# Patient Record
Sex: FEMALE | Race: WHITE | NOT HISPANIC OR LATINO | Employment: OTHER | URBAN - METROPOLITAN AREA
[De-identification: names, ages, dates, MRNs, and addresses within clinical notes are randomized per-mention and may not be internally consistent; named-entity substitution may affect disease eponyms.]

---

## 2021-11-23 ENCOUNTER — HOSPITAL ENCOUNTER (INPATIENT)
Facility: HOSPITAL | Age: 66
LOS: 6 days | Discharge: NURSING, REHAB OR LONG-TERM CARE | End: 2021-11-29
Attending: EMERGENCY MEDICINE | Admitting: INTERNAL MEDICINE

## 2021-11-23 ENCOUNTER — APPOINTMENT (OUTPATIENT)
Dept: GENERAL RADIOLOGY | Facility: HOSPITAL | Age: 66
End: 2021-11-23

## 2021-11-23 ENCOUNTER — APPOINTMENT (OUTPATIENT)
Dept: CT IMAGING | Facility: HOSPITAL | Age: 66
End: 2021-11-23

## 2021-11-23 ENCOUNTER — APPOINTMENT (OUTPATIENT)
Dept: MRI IMAGING | Facility: HOSPITAL | Age: 66
End: 2021-11-23

## 2021-11-23 ENCOUNTER — APPOINTMENT (OUTPATIENT)
Dept: CARDIOLOGY | Facility: HOSPITAL | Age: 66
End: 2021-11-23

## 2021-11-23 DIAGNOSIS — E11.65 UNCONTROLLED TYPE 2 DIABETES MELLITUS WITH HYPERGLYCEMIA (HCC): ICD-10-CM

## 2021-11-23 DIAGNOSIS — G62.9 PERIPHERAL POLYNEUROPATHY: Chronic | ICD-10-CM

## 2021-11-23 DIAGNOSIS — I63.9 CEREBROVASCULAR ACCIDENT (CVA), UNSPECIFIED MECHANISM (HCC): Primary | ICD-10-CM

## 2021-11-23 PROBLEM — E78.5 HYPERLIPIDEMIA: Chronic | Status: ACTIVE | Noted: 2021-11-23

## 2021-11-23 PROBLEM — F41.8 ANXIETY ASSOCIATED WITH DEPRESSION: Chronic | Status: ACTIVE | Noted: 2021-11-23

## 2021-11-23 PROBLEM — R53.1 LEFT-SIDED WEAKNESS: Status: ACTIVE | Noted: 2021-11-23

## 2021-11-23 PROBLEM — E66.01 MORBID OBESITY (HCC): Chronic | Status: ACTIVE | Noted: 2021-11-23

## 2021-11-23 PROBLEM — R42 DIZZINESS: Status: ACTIVE | Noted: 2021-11-23

## 2021-11-23 LAB
ABO GROUP BLD: NORMAL
ALBUMIN SERPL-MCNC: 3.9 G/DL (ref 3.5–5.2)
ALBUMIN/GLOB SERPL: 1.3 G/DL
ALP SERPL-CCNC: 121 U/L (ref 39–117)
ALT SERPL W P-5'-P-CCNC: 15 U/L (ref 1–33)
ANION GAP SERPL CALCULATED.3IONS-SCNC: 12 MMOL/L (ref 5–15)
APTT PPP: 27.1 SECONDS (ref 24–31)
AST SERPL-CCNC: 12 U/L (ref 1–32)
BASOPHILS # BLD AUTO: 0.1 10*3/MM3 (ref 0–0.2)
BASOPHILS NFR BLD AUTO: 1.1 % (ref 0–1.5)
BILIRUB SERPL-MCNC: 0.3 MG/DL (ref 0–1.2)
BLD GP AB SCN SERPL QL: NEGATIVE
BUN SERPL-MCNC: 19 MG/DL (ref 8–23)
BUN/CREAT SERPL: 25.7 (ref 7–25)
CALCIUM SPEC-SCNC: 8.9 MG/DL (ref 8.6–10.5)
CHLORIDE SERPL-SCNC: 99 MMOL/L (ref 98–107)
CHOLEST SERPL-MCNC: 225 MG/DL (ref 0–200)
CO2 SERPL-SCNC: 27 MMOL/L (ref 22–29)
CREAT SERPL-MCNC: 0.74 MG/DL (ref 0.57–1)
DEPRECATED RDW RBC AUTO: 44.2 FL (ref 37–54)
EOSINOPHIL # BLD AUTO: 0.2 10*3/MM3 (ref 0–0.4)
EOSINOPHIL NFR BLD AUTO: 2.9 % (ref 0.3–6.2)
ERYTHROCYTE [DISTWIDTH] IN BLOOD BY AUTOMATED COUNT: 14.1 % (ref 12.3–15.4)
FOLATE SERPL-MCNC: 12.2 NG/ML (ref 4.78–24.2)
GFR SERPL CREATININE-BSD FRML MDRD: 79 ML/MIN/1.73
GLOBULIN UR ELPH-MCNC: 3 GM/DL
GLUCOSE BLDC GLUCOMTR-MCNC: 158 MG/DL (ref 70–105)
GLUCOSE BLDC GLUCOMTR-MCNC: 308 MG/DL (ref 70–105)
GLUCOSE SERPL-MCNC: 314 MG/DL (ref 65–99)
HBA1C MFR BLD: 13.2 % (ref 3.5–5.6)
HCT VFR BLD AUTO: 39.3 % (ref 34–46.6)
HDLC SERPL-MCNC: 62 MG/DL (ref 40–60)
HGB BLD-MCNC: 13.4 G/DL (ref 12–15.9)
HOLD SPECIMEN: NORMAL
HOLD SPECIMEN: NORMAL
INR PPP: <0.93 (ref 0.93–1.1)
LDLC SERPL CALC-MCNC: 105 MG/DL (ref 0–100)
LDLC/HDLC SERPL: 1.52 {RATIO}
LYMPHOCYTES # BLD AUTO: 2.2 10*3/MM3 (ref 0.7–3.1)
LYMPHOCYTES NFR BLD AUTO: 31 % (ref 19.6–45.3)
MAGNESIUM SERPL-MCNC: 1.8 MG/DL (ref 1.6–2.4)
MCH RBC QN AUTO: 30.7 PG (ref 26.6–33)
MCHC RBC AUTO-ENTMCNC: 34 G/DL (ref 31.5–35.7)
MCV RBC AUTO: 90.1 FL (ref 79–97)
MONOCYTES # BLD AUTO: 0.4 10*3/MM3 (ref 0.1–0.9)
MONOCYTES NFR BLD AUTO: 5.2 % (ref 5–12)
NEUTROPHILS NFR BLD AUTO: 4.3 10*3/MM3 (ref 1.7–7)
NEUTROPHILS NFR BLD AUTO: 59.8 % (ref 42.7–76)
NRBC BLD AUTO-RTO: 0.1 /100 WBC (ref 0–0.2)
PLATELET # BLD AUTO: 287 10*3/MM3 (ref 140–450)
PMV BLD AUTO: 7 FL (ref 6–12)
POTASSIUM SERPL-SCNC: 4.2 MMOL/L (ref 3.5–5.2)
POTASSIUM SERPL-SCNC: 4.4 MMOL/L (ref 3.5–5.2)
PROT SERPL-MCNC: 6.9 G/DL (ref 6–8.5)
PROTHROMBIN TIME: 10.3 SECONDS (ref 9.6–11.7)
RBC # BLD AUTO: 4.37 10*6/MM3 (ref 3.77–5.28)
RH BLD: POSITIVE
SARS-COV-2 RNA PNL SPEC NAA+PROBE: NOT DETECTED
SODIUM SERPL-SCNC: 138 MMOL/L (ref 136–145)
T&S EXPIRATION DATE: NORMAL
T4 FREE SERPL-MCNC: 1.4 NG/DL (ref 0.93–1.7)
TRIGL SERPL-MCNC: 344 MG/DL (ref 0–150)
TROPONIN T SERPL-MCNC: <0.01 NG/ML (ref 0–0.03)
TSH SERPL DL<=0.05 MIU/L-ACNC: 0.28 UIU/ML (ref 0.27–4.2)
VIT B12 BLD-MCNC: 299 PG/ML (ref 211–946)
VLDLC SERPL-MCNC: 58 MG/DL (ref 5–40)
WBC NRBC COR # BLD: 7.1 10*3/MM3 (ref 3.4–10.8)

## 2021-11-23 PROCEDURE — 86850 RBC ANTIBODY SCREEN: CPT | Performed by: EMERGENCY MEDICINE

## 2021-11-23 PROCEDURE — 85730 THROMBOPLASTIN TIME PARTIAL: CPT | Performed by: EMERGENCY MEDICINE

## 2021-11-23 PROCEDURE — 99222 1ST HOSP IP/OBS MODERATE 55: CPT | Performed by: PSYCHIATRY & NEUROLOGY

## 2021-11-23 PROCEDURE — 86900 BLOOD TYPING SEROLOGIC ABO: CPT | Performed by: EMERGENCY MEDICINE

## 2021-11-23 PROCEDURE — 86900 BLOOD TYPING SEROLOGIC ABO: CPT

## 2021-11-23 PROCEDURE — 63710000001 INSULIN GLARGINE PER 5 UNITS: Performed by: INTERNAL MEDICINE

## 2021-11-23 PROCEDURE — 80061 LIPID PANEL: CPT | Performed by: PSYCHIATRY & NEUROLOGY

## 2021-11-23 PROCEDURE — 85025 COMPLETE CBC W/AUTO DIFF WBC: CPT | Performed by: EMERGENCY MEDICINE

## 2021-11-23 PROCEDURE — 63710000001 INSULIN LISPRO (HUMAN) PER 5 UNITS: Performed by: EMERGENCY MEDICINE

## 2021-11-23 PROCEDURE — 0 IOPAMIDOL PER 1 ML: Performed by: EMERGENCY MEDICINE

## 2021-11-23 PROCEDURE — 93005 ELECTROCARDIOGRAM TRACING: CPT | Performed by: EMERGENCY MEDICINE

## 2021-11-23 PROCEDURE — 70496 CT ANGIOGRAPHY HEAD: CPT

## 2021-11-23 PROCEDURE — 70498 CT ANGIOGRAPHY NECK: CPT

## 2021-11-23 PROCEDURE — 83036 HEMOGLOBIN GLYCOSYLATED A1C: CPT | Performed by: PSYCHIATRY & NEUROLOGY

## 2021-11-23 PROCEDURE — 82962 GLUCOSE BLOOD TEST: CPT

## 2021-11-23 PROCEDURE — 84484 ASSAY OF TROPONIN QUANT: CPT | Performed by: EMERGENCY MEDICINE

## 2021-11-23 PROCEDURE — 92610 EVALUATE SWALLOWING FUNCTION: CPT

## 2021-11-23 PROCEDURE — 80053 COMPREHEN METABOLIC PANEL: CPT | Performed by: EMERGENCY MEDICINE

## 2021-11-23 PROCEDURE — 82607 VITAMIN B-12: CPT | Performed by: PSYCHIATRY & NEUROLOGY

## 2021-11-23 PROCEDURE — 84439 ASSAY OF FREE THYROXINE: CPT | Performed by: PSYCHIATRY & NEUROLOGY

## 2021-11-23 PROCEDURE — 71045 X-RAY EXAM CHEST 1 VIEW: CPT

## 2021-11-23 PROCEDURE — 84132 ASSAY OF SERUM POTASSIUM: CPT | Performed by: PHYSICIAN ASSISTANT

## 2021-11-23 PROCEDURE — 87635 SARS-COV-2 COVID-19 AMP PRB: CPT | Performed by: EMERGENCY MEDICINE

## 2021-11-23 PROCEDURE — 99222 1ST HOSP IP/OBS MODERATE 55: CPT | Performed by: PHYSICIAN ASSISTANT

## 2021-11-23 PROCEDURE — 86901 BLOOD TYPING SEROLOGIC RH(D): CPT

## 2021-11-23 PROCEDURE — 86901 BLOOD TYPING SEROLOGIC RH(D): CPT | Performed by: EMERGENCY MEDICINE

## 2021-11-23 PROCEDURE — 99285 EMERGENCY DEPT VISIT HI MDM: CPT

## 2021-11-23 PROCEDURE — 84443 ASSAY THYROID STIM HORMONE: CPT | Performed by: PSYCHIATRY & NEUROLOGY

## 2021-11-23 PROCEDURE — 85610 PROTHROMBIN TIME: CPT | Performed by: EMERGENCY MEDICINE

## 2021-11-23 PROCEDURE — 82746 ASSAY OF FOLIC ACID SERUM: CPT | Performed by: PSYCHIATRY & NEUROLOGY

## 2021-11-23 PROCEDURE — 36415 COLL VENOUS BLD VENIPUNCTURE: CPT | Performed by: EMERGENCY MEDICINE

## 2021-11-23 PROCEDURE — 70551 MRI BRAIN STEM W/O DYE: CPT

## 2021-11-23 PROCEDURE — 70450 CT HEAD/BRAIN W/O DYE: CPT

## 2021-11-23 PROCEDURE — 83735 ASSAY OF MAGNESIUM: CPT | Performed by: PHYSICIAN ASSISTANT

## 2021-11-23 RX ORDER — ONDANSETRON 2 MG/ML
4 INJECTION INTRAMUSCULAR; INTRAVENOUS EVERY 6 HOURS PRN
Status: DISCONTINUED | OUTPATIENT
Start: 2021-11-23 | End: 2021-11-29 | Stop reason: HOSPADM

## 2021-11-23 RX ORDER — GABAPENTIN 300 MG/1
300 CAPSULE ORAL
Status: DISCONTINUED | OUTPATIENT
Start: 2021-11-24 | End: 2021-11-29 | Stop reason: HOSPADM

## 2021-11-23 RX ORDER — INSULIN LISPRO 100 [IU]/ML
5 INJECTION, SOLUTION INTRAVENOUS; SUBCUTANEOUS
Status: DISCONTINUED | OUTPATIENT
Start: 2021-11-23 | End: 2021-11-29 | Stop reason: HOSPADM

## 2021-11-23 RX ORDER — SERTRALINE HYDROCHLORIDE 100 MG/1
150 TABLET, FILM COATED ORAL DAILY
COMMUNITY

## 2021-11-23 RX ORDER — PRAVASTATIN SODIUM 40 MG
40 TABLET ORAL DAILY
COMMUNITY
End: 2021-11-23

## 2021-11-23 RX ORDER — POTASSIUM CHLORIDE 20 MEQ/1
40 TABLET, EXTENDED RELEASE ORAL AS NEEDED
Status: DISCONTINUED | OUTPATIENT
Start: 2021-11-23 | End: 2021-11-29 | Stop reason: HOSPADM

## 2021-11-23 RX ORDER — DEXTROSE MONOHYDRATE 25 G/50ML
25 INJECTION, SOLUTION INTRAVENOUS
Status: DISCONTINUED | OUTPATIENT
Start: 2021-11-23 | End: 2021-11-29 | Stop reason: HOSPADM

## 2021-11-23 RX ORDER — MAGNESIUM SULFATE HEPTAHYDRATE 40 MG/ML
4 INJECTION, SOLUTION INTRAVENOUS AS NEEDED
Status: DISCONTINUED | OUTPATIENT
Start: 2021-11-23 | End: 2021-11-29 | Stop reason: HOSPADM

## 2021-11-23 RX ORDER — INSULIN LISPRO 100 [IU]/ML
6 INJECTION, SOLUTION INTRAVENOUS; SUBCUTANEOUS ONCE
Status: COMPLETED | OUTPATIENT
Start: 2021-11-23 | End: 2021-11-23

## 2021-11-23 RX ORDER — ATORVASTATIN CALCIUM 20 MG/1
20 TABLET, FILM COATED ORAL DAILY
COMMUNITY
End: 2021-11-29 | Stop reason: HOSPADM

## 2021-11-23 RX ORDER — INSULIN GLARGINE 100 [IU]/ML
10 INJECTION, SOLUTION SUBCUTANEOUS EVERY MORNING
COMMUNITY
End: 2021-11-29 | Stop reason: HOSPADM

## 2021-11-23 RX ORDER — OLANZAPINE 10 MG/2ML
1 INJECTION, POWDER, LYOPHILIZED, FOR SOLUTION INTRAMUSCULAR
Status: DISCONTINUED | OUTPATIENT
Start: 2021-11-23 | End: 2021-11-29 | Stop reason: HOSPADM

## 2021-11-23 RX ORDER — LISINOPRIL 10 MG/1
10 TABLET ORAL DAILY
Status: ON HOLD | COMMUNITY
End: 2021-11-29 | Stop reason: SDUPTHER

## 2021-11-23 RX ORDER — SODIUM CHLORIDE 0.9 % (FLUSH) 0.9 %
10 SYRINGE (ML) INJECTION AS NEEDED
Status: DISCONTINUED | OUTPATIENT
Start: 2021-11-23 | End: 2021-11-29 | Stop reason: HOSPADM

## 2021-11-23 RX ORDER — GABAPENTIN 300 MG/1
300 CAPSULE ORAL
COMMUNITY

## 2021-11-23 RX ORDER — CHOLECALCIFEROL (VITAMIN D3) 125 MCG
5 CAPSULE ORAL NIGHTLY PRN
Status: DISCONTINUED | OUTPATIENT
Start: 2021-11-23 | End: 2021-11-29 | Stop reason: HOSPADM

## 2021-11-23 RX ORDER — TRAMADOL HYDROCHLORIDE 100 MG/1
100 TABLET, EXTENDED RELEASE ORAL
COMMUNITY
End: 2021-11-23

## 2021-11-23 RX ORDER — GABAPENTIN 300 MG/1
600 CAPSULE ORAL NIGHTLY
Status: DISCONTINUED | OUTPATIENT
Start: 2021-11-23 | End: 2021-11-29 | Stop reason: HOSPADM

## 2021-11-23 RX ORDER — INSULIN GLARGINE 100 [IU]/ML
15 INJECTION, SOLUTION SUBCUTANEOUS EVERY 12 HOURS SCHEDULED
Status: DISCONTINUED | OUTPATIENT
Start: 2021-11-23 | End: 2021-11-29 | Stop reason: HOSPADM

## 2021-11-23 RX ORDER — INSULIN LISPRO 100 [IU]/ML
0-24 INJECTION, SOLUTION INTRAVENOUS; SUBCUTANEOUS AS NEEDED
Status: DISCONTINUED | OUTPATIENT
Start: 2021-11-23 | End: 2021-11-29 | Stop reason: HOSPADM

## 2021-11-23 RX ORDER — GABAPENTIN 300 MG/1
600 CAPSULE ORAL NIGHTLY
COMMUNITY

## 2021-11-23 RX ORDER — LISINOPRIL 5 MG/1
10 TABLET ORAL DAILY
Status: DISCONTINUED | OUTPATIENT
Start: 2021-11-24 | End: 2021-11-29 | Stop reason: HOSPADM

## 2021-11-23 RX ORDER — INSULIN LISPRO 100 [IU]/ML
0-24 INJECTION, SOLUTION INTRAVENOUS; SUBCUTANEOUS
Status: DISCONTINUED | OUTPATIENT
Start: 2021-11-23 | End: 2021-11-29 | Stop reason: HOSPADM

## 2021-11-23 RX ORDER — SODIUM CHLORIDE 0.9 % (FLUSH) 0.9 %
10 SYRINGE (ML) INJECTION EVERY 12 HOURS SCHEDULED
Status: DISCONTINUED | OUTPATIENT
Start: 2021-11-23 | End: 2021-11-29 | Stop reason: HOSPADM

## 2021-11-23 RX ORDER — ALUMINA, MAGNESIA, AND SIMETHICONE 2400; 2400; 240 MG/30ML; MG/30ML; MG/30ML
15 SUSPENSION ORAL EVERY 6 HOURS PRN
Status: DISCONTINUED | OUTPATIENT
Start: 2021-11-23 | End: 2021-11-29 | Stop reason: HOSPADM

## 2021-11-23 RX ORDER — ATORVASTATIN CALCIUM 40 MG/1
40 TABLET, FILM COATED ORAL NIGHTLY
Status: DISCONTINUED | OUTPATIENT
Start: 2021-11-23 | End: 2021-11-29 | Stop reason: HOSPADM

## 2021-11-23 RX ORDER — METFORMIN HYDROCHLORIDE 500 MG/1
1000 TABLET, EXTENDED RELEASE ORAL 2 TIMES DAILY
COMMUNITY

## 2021-11-23 RX ORDER — POTASSIUM CHLORIDE 7.45 MG/ML
10 INJECTION INTRAVENOUS
Status: DISCONTINUED | OUTPATIENT
Start: 2021-11-23 | End: 2021-11-29 | Stop reason: HOSPADM

## 2021-11-23 RX ORDER — NICOTINE POLACRILEX 4 MG
15 LOZENGE BUCCAL
Status: DISCONTINUED | OUTPATIENT
Start: 2021-11-23 | End: 2021-11-29 | Stop reason: HOSPADM

## 2021-11-23 RX ORDER — PIOGLITAZONEHYDROCHLORIDE 45 MG/1
45 TABLET ORAL DAILY
COMMUNITY
End: 2021-11-29 | Stop reason: HOSPADM

## 2021-11-23 RX ORDER — ACETAMINOPHEN 650 MG/1
650 SUPPOSITORY RECTAL EVERY 4 HOURS PRN
Status: DISCONTINUED | OUTPATIENT
Start: 2021-11-23 | End: 2021-11-29 | Stop reason: HOSPADM

## 2021-11-23 RX ORDER — PIOGLITAZONEHYDROCHLORIDE 45 MG/1
45 TABLET ORAL DAILY
Status: DISCONTINUED | OUTPATIENT
Start: 2021-11-24 | End: 2021-11-23

## 2021-11-23 RX ORDER — NITROGLYCERIN 0.4 MG/1
0.4 TABLET SUBLINGUAL
Status: DISCONTINUED | OUTPATIENT
Start: 2021-11-23 | End: 2021-11-29 | Stop reason: HOSPADM

## 2021-11-23 RX ORDER — MAGNESIUM SULFATE HEPTAHYDRATE 40 MG/ML
2 INJECTION, SOLUTION INTRAVENOUS AS NEEDED
Status: DISCONTINUED | OUTPATIENT
Start: 2021-11-23 | End: 2021-11-29 | Stop reason: HOSPADM

## 2021-11-23 RX ORDER — ACETAMINOPHEN 325 MG/1
650 TABLET ORAL EVERY 4 HOURS PRN
Status: DISCONTINUED | OUTPATIENT
Start: 2021-11-23 | End: 2021-11-29 | Stop reason: HOSPADM

## 2021-11-23 RX ORDER — POTASSIUM CHLORIDE 1.5 G/1.77G
40 POWDER, FOR SOLUTION ORAL AS NEEDED
Status: DISCONTINUED | OUTPATIENT
Start: 2021-11-23 | End: 2021-11-29 | Stop reason: HOSPADM

## 2021-11-23 RX ORDER — ONDANSETRON 4 MG/1
4 TABLET, FILM COATED ORAL EVERY 6 HOURS PRN
Status: DISCONTINUED | OUTPATIENT
Start: 2021-11-23 | End: 2021-11-29 | Stop reason: HOSPADM

## 2021-11-23 RX ORDER — ASPIRIN 325 MG
325 TABLET ORAL DAILY
Status: DISCONTINUED | OUTPATIENT
Start: 2021-11-23 | End: 2021-11-29 | Stop reason: HOSPADM

## 2021-11-23 RX ORDER — ACETAMINOPHEN 160 MG/5ML
650 SOLUTION ORAL EVERY 4 HOURS PRN
Status: DISCONTINUED | OUTPATIENT
Start: 2021-11-23 | End: 2021-11-29 | Stop reason: HOSPADM

## 2021-11-23 RX ADMIN — ASPIRIN 325 MG ORAL TABLET 325 MG: 325 PILL ORAL at 15:21

## 2021-11-23 RX ADMIN — ATORVASTATIN CALCIUM 40 MG: 40 TABLET, FILM COATED ORAL at 20:18

## 2021-11-23 RX ADMIN — IOPAMIDOL 100 ML: 755 INJECTION, SOLUTION INTRAVENOUS at 14:47

## 2021-11-23 RX ADMIN — SODIUM CHLORIDE 500 ML: 9 INJECTION, SOLUTION INTRAVENOUS at 20:08

## 2021-11-23 RX ADMIN — INSULIN GLARGINE 15 UNITS: 100 INJECTION, SOLUTION SUBCUTANEOUS at 19:52

## 2021-11-23 RX ADMIN — GABAPENTIN 600 MG: 300 CAPSULE ORAL at 22:25

## 2021-11-23 RX ADMIN — INSULIN LISPRO 6 UNITS: 100 INJECTION, SOLUTION INTRAVENOUS; SUBCUTANEOUS at 16:43

## 2021-11-23 RX ADMIN — SODIUM CHLORIDE, PRESERVATIVE FREE 10 ML: 5 INJECTION INTRAVENOUS at 20:18

## 2021-11-24 ENCOUNTER — APPOINTMENT (OUTPATIENT)
Dept: CARDIOLOGY | Facility: HOSPITAL | Age: 66
End: 2021-11-24

## 2021-11-24 ENCOUNTER — APPOINTMENT (OUTPATIENT)
Dept: MRI IMAGING | Facility: HOSPITAL | Age: 66
End: 2021-11-24

## 2021-11-24 LAB
ANION GAP SERPL CALCULATED.3IONS-SCNC: 11 MMOL/L (ref 5–15)
BASOPHILS # BLD AUTO: 0 10*3/MM3 (ref 0–0.2)
BASOPHILS NFR BLD AUTO: 0.6 % (ref 0–1.5)
BH CV ECHO MEAS - AO MAX PG (FULL): 3.4 MMHG
BH CV ECHO MEAS - AO MAX PG: 8.4 MMHG
BH CV ECHO MEAS - AO MEAN PG (FULL): 1.7 MMHG
BH CV ECHO MEAS - AO MEAN PG: 4.1 MMHG
BH CV ECHO MEAS - AO ROOT AREA (BSA CORRECTED): 1.5
BH CV ECHO MEAS - AO ROOT AREA: 9 CM^2
BH CV ECHO MEAS - AO ROOT DIAM: 3.4 CM
BH CV ECHO MEAS - AO V2 MAX: 144.8 CM/SEC
BH CV ECHO MEAS - AO V2 MEAN: 94.3 CM/SEC
BH CV ECHO MEAS - AO V2 VTI: 29.4 CM
BH CV ECHO MEAS - ASC AORTA: 3.9 CM
BH CV ECHO MEAS - AVA(I,A): 3.8 CM^2
BH CV ECHO MEAS - AVA(I,D): 3.8 CM^2
BH CV ECHO MEAS - AVA(V,A): 3.3 CM^2
BH CV ECHO MEAS - AVA(V,D): 3.3 CM^2
BH CV ECHO MEAS - BSA(HAYCOCK): 2.5 M^2
BH CV ECHO MEAS - BSA: 2.3 M^2
BH CV ECHO MEAS - BZI_BMI: 38.8 KILOGRAMS/M^2
BH CV ECHO MEAS - BZI_METRIC_HEIGHT: 175.3 CM
BH CV ECHO MEAS - BZI_METRIC_WEIGHT: 119.3 KG
BH CV ECHO MEAS - EDV(CUBED): 75.4 ML
BH CV ECHO MEAS - EDV(MOD-SP4): 87.4 ML
BH CV ECHO MEAS - EDV(TEICH): 79.7 ML
BH CV ECHO MEAS - EF(CUBED): 67.7 %
BH CV ECHO MEAS - EF(MOD-BP): 58 %
BH CV ECHO MEAS - EF(MOD-SP4): 57.7 %
BH CV ECHO MEAS - EF(TEICH): 59.6 %
BH CV ECHO MEAS - ESV(CUBED): 24.4 ML
BH CV ECHO MEAS - ESV(MOD-SP4): 37 ML
BH CV ECHO MEAS - ESV(TEICH): 32.2 ML
BH CV ECHO MEAS - FS: 31.4 %
BH CV ECHO MEAS - IVS/LVPW: 0.97
BH CV ECHO MEAS - IVSD: 1.9 CM
BH CV ECHO MEAS - LA DIMENSION(2D): 4.4 CM
BH CV ECHO MEAS - LV DIASTOLIC VOL/BSA (35-75): 37.7 ML/M^2
BH CV ECHO MEAS - LV MASS(C)D: 381.4 GRAMS
BH CV ECHO MEAS - LV MASS(C)DI: 164.4 GRAMS/M^2
BH CV ECHO MEAS - LV MAX PG: 5 MMHG
BH CV ECHO MEAS - LV MEAN PG: 2.4 MMHG
BH CV ECHO MEAS - LV SYSTOLIC VOL/BSA (12-30): 15.9 ML/M^2
BH CV ECHO MEAS - LV V1 MAX: 112.1 CM/SEC
BH CV ECHO MEAS - LV V1 MEAN: 70.6 CM/SEC
BH CV ECHO MEAS - LV V1 VTI: 26.6 CM
BH CV ECHO MEAS - LVIDD: 4.2 CM
BH CV ECHO MEAS - LVIDS: 2.9 CM
BH CV ECHO MEAS - LVOT AREA: 4.2 CM^2
BH CV ECHO MEAS - LVOT DIAM: 2.3 CM
BH CV ECHO MEAS - LVPWD: 2 CM
BH CV ECHO MEAS - MV A MAX VEL: 98.7 CM/SEC
BH CV ECHO MEAS - MV DEC SLOPE: 220.2 CM/SEC^2
BH CV ECHO MEAS - MV DEC TIME: 0.35 SEC
BH CV ECHO MEAS - MV E MAX VEL: 76.8 CM/SEC
BH CV ECHO MEAS - MV E/A: 0.78
BH CV ECHO MEAS - MV MAX PG: 5.8 MMHG
BH CV ECHO MEAS - MV MEAN PG: 2.5 MMHG
BH CV ECHO MEAS - MV V2 MAX: 119.9 CM/SEC
BH CV ECHO MEAS - MV V2 MEAN: 75 CM/SEC
BH CV ECHO MEAS - MV V2 VTI: 27.8 CM
BH CV ECHO MEAS - MVA(VTI): 4.1 CM^2
BH CV ECHO MEAS - PA ACC TIME: 0.11 SEC
BH CV ECHO MEAS - PA MAX PG (FULL): 1.2 MMHG
BH CV ECHO MEAS - PA MAX PG: 3.1 MMHG
BH CV ECHO MEAS - PA MEAN PG (FULL): 0.72 MMHG
BH CV ECHO MEAS - PA MEAN PG: 1.6 MMHG
BH CV ECHO MEAS - PA PR(ACCEL): 29.2 MMHG
BH CV ECHO MEAS - PA V2 MAX: 87.8 CM/SEC
BH CV ECHO MEAS - PA V2 MEAN: 58.5 CM/SEC
BH CV ECHO MEAS - PA V2 VTI: 17.8 CM
BH CV ECHO MEAS - RV MAX PG: 1.8 MMHG
BH CV ECHO MEAS - RV MEAN PG: 0.86 MMHG
BH CV ECHO MEAS - RV V1 MAX: 67.8 CM/SEC
BH CV ECHO MEAS - RV V1 MEAN: 41.1 CM/SEC
BH CV ECHO MEAS - RV V1 VTI: 11.7 CM
BH CV ECHO MEAS - RVDD: 3.8 CM
BH CV ECHO MEAS - SI(AO): 113.9 ML/M^2
BH CV ECHO MEAS - SI(CUBED): 22 ML/M^2
BH CV ECHO MEAS - SI(LVOT): 48.6 ML/M^2
BH CV ECHO MEAS - SI(MOD-SP4): 21.7 ML/M^2
BH CV ECHO MEAS - SI(TEICH): 20.5 ML/M^2
BH CV ECHO MEAS - SV(AO): 264.3 ML
BH CV ECHO MEAS - SV(CUBED): 51 ML
BH CV ECHO MEAS - SV(LVOT): 112.8 ML
BH CV ECHO MEAS - SV(MOD-SP4): 50.4 ML
BH CV ECHO MEAS - SV(TEICH): 47.5 ML
BUN SERPL-MCNC: 15 MG/DL (ref 8–23)
BUN/CREAT SERPL: 24.2 (ref 7–25)
CALCIUM SPEC-SCNC: 8.6 MG/DL (ref 8.6–10.5)
CHLORIDE SERPL-SCNC: 105 MMOL/L (ref 98–107)
CO2 SERPL-SCNC: 24 MMOL/L (ref 22–29)
CREAT SERPL-MCNC: 0.62 MG/DL (ref 0.57–1)
DEPRECATED RDW RBC AUTO: 43.3 FL (ref 37–54)
EOSINOPHIL # BLD AUTO: 0.2 10*3/MM3 (ref 0–0.4)
EOSINOPHIL NFR BLD AUTO: 3.1 % (ref 0.3–6.2)
ERYTHROCYTE [DISTWIDTH] IN BLOOD BY AUTOMATED COUNT: 13.7 % (ref 12.3–15.4)
GFR SERPL CREATININE-BSD FRML MDRD: 97 ML/MIN/1.73
GLUCOSE BLDC GLUCOMTR-MCNC: 133 MG/DL (ref 70–105)
GLUCOSE BLDC GLUCOMTR-MCNC: 165 MG/DL (ref 70–105)
GLUCOSE BLDC GLUCOMTR-MCNC: 204 MG/DL (ref 70–105)
GLUCOSE BLDC GLUCOMTR-MCNC: 221 MG/DL (ref 70–105)
GLUCOSE SERPL-MCNC: 174 MG/DL (ref 65–99)
HCT VFR BLD AUTO: 36.2 % (ref 34–46.6)
HGB BLD-MCNC: 12.2 G/DL (ref 12–15.9)
LYMPHOCYTES # BLD AUTO: 2.8 10*3/MM3 (ref 0.7–3.1)
LYMPHOCYTES NFR BLD AUTO: 39 % (ref 19.6–45.3)
MAGNESIUM SERPL-MCNC: 2 MG/DL (ref 1.6–2.4)
MCH RBC QN AUTO: 30.3 PG (ref 26.6–33)
MCHC RBC AUTO-ENTMCNC: 33.7 G/DL (ref 31.5–35.7)
MCV RBC AUTO: 89.8 FL (ref 79–97)
MONOCYTES # BLD AUTO: 0.4 10*3/MM3 (ref 0.1–0.9)
MONOCYTES NFR BLD AUTO: 5.9 % (ref 5–12)
NEUTROPHILS NFR BLD AUTO: 3.6 10*3/MM3 (ref 1.7–7)
NEUTROPHILS NFR BLD AUTO: 51.4 % (ref 42.7–76)
NRBC BLD AUTO-RTO: 0.1 /100 WBC (ref 0–0.2)
PLATELET # BLD AUTO: 248 10*3/MM3 (ref 140–450)
PMV BLD AUTO: 7.1 FL (ref 6–12)
POTASSIUM SERPL-SCNC: 3.9 MMOL/L (ref 3.5–5.2)
RBC # BLD AUTO: 4.03 10*6/MM3 (ref 3.77–5.28)
SODIUM SERPL-SCNC: 140 MMOL/L (ref 136–145)
WBC NRBC COR # BLD: 7.1 10*3/MM3 (ref 3.4–10.8)

## 2021-11-24 PROCEDURE — 72141 MRI NECK SPINE W/O DYE: CPT

## 2021-11-24 PROCEDURE — 93306 TTE W/DOPPLER COMPLETE: CPT

## 2021-11-24 PROCEDURE — 99231 SBSQ HOSP IP/OBS SF/LOW 25: CPT | Performed by: PSYCHIATRY & NEUROLOGY

## 2021-11-24 PROCEDURE — 97530 THERAPEUTIC ACTIVITIES: CPT

## 2021-11-24 PROCEDURE — 85025 COMPLETE CBC W/AUTO DIFF WBC: CPT | Performed by: PHYSICIAN ASSISTANT

## 2021-11-24 PROCEDURE — 97166 OT EVAL MOD COMPLEX 45 MIN: CPT

## 2021-11-24 PROCEDURE — 80048 BASIC METABOLIC PNL TOTAL CA: CPT | Performed by: PHYSICIAN ASSISTANT

## 2021-11-24 PROCEDURE — 82962 GLUCOSE BLOOD TEST: CPT

## 2021-11-24 PROCEDURE — 63710000001 INSULIN LISPRO (HUMAN) PER 5 UNITS: Performed by: INTERNAL MEDICINE

## 2021-11-24 PROCEDURE — 97161 PT EVAL LOW COMPLEX 20 MIN: CPT

## 2021-11-24 PROCEDURE — 83735 ASSAY OF MAGNESIUM: CPT | Performed by: PHYSICIAN ASSISTANT

## 2021-11-24 PROCEDURE — 70551 MRI BRAIN STEM W/O DYE: CPT

## 2021-11-24 PROCEDURE — 93306 TTE W/DOPPLER COMPLETE: CPT | Performed by: INTERNAL MEDICINE

## 2021-11-24 PROCEDURE — 63710000001 INSULIN GLARGINE PER 5 UNITS: Performed by: INTERNAL MEDICINE

## 2021-11-24 RX ORDER — AMLODIPINE BESYLATE 5 MG/1
5 TABLET ORAL
Status: DISCONTINUED | OUTPATIENT
Start: 2021-11-24 | End: 2021-11-29 | Stop reason: HOSPADM

## 2021-11-24 RX ORDER — ISOPROPYL ALCOHOL 700 MG/ML
1 CLOTH TOPICAL
Qty: 200 EACH | Refills: 2 | Status: CANCELLED | OUTPATIENT
Start: 2021-11-24

## 2021-11-24 RX ORDER — INSULIN LISPRO 100 [IU]/ML
5 INJECTION, SOLUTION INTRAVENOUS; SUBCUTANEOUS
Refills: 2 | Status: CANCELLED | OUTPATIENT
Start: 2021-11-24

## 2021-11-24 RX ADMIN — SODIUM CHLORIDE, PRESERVATIVE FREE 10 ML: 5 INJECTION INTRAVENOUS at 08:10

## 2021-11-24 RX ADMIN — INSULIN LISPRO 4 UNITS: 100 INJECTION, SOLUTION INTRAVENOUS; SUBCUTANEOUS at 12:46

## 2021-11-24 RX ADMIN — GABAPENTIN 300 MG: 300 CAPSULE ORAL at 08:10

## 2021-11-24 RX ADMIN — INSULIN LISPRO 5 UNITS: 100 INJECTION, SOLUTION INTRAVENOUS; SUBCUTANEOUS at 12:46

## 2021-11-24 RX ADMIN — INSULIN GLARGINE 15 UNITS: 100 INJECTION, SOLUTION SUBCUTANEOUS at 20:12

## 2021-11-24 RX ADMIN — INSULIN LISPRO 8 UNITS: 100 INJECTION, SOLUTION INTRAVENOUS; SUBCUTANEOUS at 08:12

## 2021-11-24 RX ADMIN — ASPIRIN 325 MG ORAL TABLET 325 MG: 325 PILL ORAL at 08:10

## 2021-11-24 RX ADMIN — LISINOPRIL 10 MG: 5 TABLET ORAL at 08:10

## 2021-11-24 RX ADMIN — SERTRALINE 150 MG: 100 TABLET, FILM COATED ORAL at 08:10

## 2021-11-24 RX ADMIN — INSULIN LISPRO 5 UNITS: 100 INJECTION, SOLUTION INTRAVENOUS; SUBCUTANEOUS at 17:08

## 2021-11-24 RX ADMIN — GABAPENTIN 300 MG: 300 CAPSULE ORAL at 12:46

## 2021-11-24 RX ADMIN — ATORVASTATIN CALCIUM 40 MG: 40 TABLET, FILM COATED ORAL at 20:12

## 2021-11-24 RX ADMIN — INSULIN GLARGINE 15 UNITS: 100 INJECTION, SOLUTION SUBCUTANEOUS at 08:10

## 2021-11-24 RX ADMIN — GABAPENTIN 600 MG: 300 CAPSULE ORAL at 20:12

## 2021-11-24 RX ADMIN — SODIUM CHLORIDE, PRESERVATIVE FREE 10 ML: 5 INJECTION INTRAVENOUS at 20:12

## 2021-11-24 RX ADMIN — INSULIN LISPRO 5 UNITS: 100 INJECTION, SOLUTION INTRAVENOUS; SUBCUTANEOUS at 08:09

## 2021-11-24 RX ADMIN — AMLODIPINE BESYLATE 5 MG: 5 TABLET ORAL at 12:46

## 2021-11-25 LAB
BACTERIA UR QL AUTO: ABNORMAL /HPF
BILIRUB UR QL STRIP: NEGATIVE
CLARITY UR: ABNORMAL
COLOR UR: YELLOW
GLUCOSE BLDC GLUCOMTR-MCNC: 147 MG/DL (ref 70–105)
GLUCOSE BLDC GLUCOMTR-MCNC: 194 MG/DL (ref 70–105)
GLUCOSE BLDC GLUCOMTR-MCNC: 216 MG/DL (ref 70–105)
GLUCOSE BLDC GLUCOMTR-MCNC: 238 MG/DL (ref 70–105)
GLUCOSE UR STRIP-MCNC: NEGATIVE MG/DL
HGB UR QL STRIP.AUTO: ABNORMAL
HYALINE CASTS UR QL AUTO: ABNORMAL /LPF
KETONES UR QL STRIP: NEGATIVE
LEUKOCYTE ESTERASE UR QL STRIP.AUTO: ABNORMAL
MAGNESIUM SERPL-MCNC: 1.9 MG/DL (ref 1.6–2.4)
NITRITE UR QL STRIP: POSITIVE
PH UR STRIP.AUTO: 5.5 [PH] (ref 5–8)
POTASSIUM SERPL-SCNC: 3.8 MMOL/L (ref 3.5–5.2)
PROT UR QL STRIP: ABNORMAL
QT INTERVAL: 472 MS
RBC # UR STRIP: ABNORMAL /HPF
REF LAB TEST METHOD: ABNORMAL
SP GR UR STRIP: 1.02 (ref 1–1.03)
SQUAMOUS #/AREA URNS HPF: ABNORMAL /HPF
UROBILINOGEN UR QL STRIP: ABNORMAL
WBC # UR STRIP: ABNORMAL /HPF

## 2021-11-25 PROCEDURE — 82962 GLUCOSE BLOOD TEST: CPT

## 2021-11-25 PROCEDURE — 25010000002 CEFTRIAXONE PER 250 MG: Performed by: NURSE PRACTITIONER

## 2021-11-25 PROCEDURE — 87186 SC STD MICRODIL/AGAR DIL: CPT | Performed by: INTERNAL MEDICINE

## 2021-11-25 PROCEDURE — 63710000001 INSULIN GLARGINE PER 5 UNITS: Performed by: INTERNAL MEDICINE

## 2021-11-25 PROCEDURE — 81001 URINALYSIS AUTO W/SCOPE: CPT | Performed by: INTERNAL MEDICINE

## 2021-11-25 PROCEDURE — 84132 ASSAY OF SERUM POTASSIUM: CPT | Performed by: PHYSICIAN ASSISTANT

## 2021-11-25 PROCEDURE — 87086 URINE CULTURE/COLONY COUNT: CPT | Performed by: INTERNAL MEDICINE

## 2021-11-25 PROCEDURE — 63710000001 INSULIN LISPRO (HUMAN) PER 5 UNITS: Performed by: INTERNAL MEDICINE

## 2021-11-25 PROCEDURE — 83735 ASSAY OF MAGNESIUM: CPT | Performed by: PHYSICIAN ASSISTANT

## 2021-11-25 PROCEDURE — 87077 CULTURE AEROBIC IDENTIFY: CPT | Performed by: INTERNAL MEDICINE

## 2021-11-25 RX ORDER — HYDROCODONE BITARTRATE AND ACETAMINOPHEN 5; 325 MG/1; MG/1
1 TABLET ORAL EVERY 6 HOURS PRN
Status: DISCONTINUED | OUTPATIENT
Start: 2021-11-25 | End: 2021-11-29 | Stop reason: HOSPADM

## 2021-11-25 RX ADMIN — GABAPENTIN 300 MG: 300 CAPSULE ORAL at 11:30

## 2021-11-25 RX ADMIN — GABAPENTIN 600 MG: 300 CAPSULE ORAL at 21:18

## 2021-11-25 RX ADMIN — SERTRALINE 150 MG: 100 TABLET, FILM COATED ORAL at 09:28

## 2021-11-25 RX ADMIN — SODIUM CHLORIDE, PRESERVATIVE FREE 10 ML: 5 INJECTION INTRAVENOUS at 21:20

## 2021-11-25 RX ADMIN — ATORVASTATIN CALCIUM 40 MG: 40 TABLET, FILM COATED ORAL at 21:18

## 2021-11-25 RX ADMIN — SODIUM CHLORIDE, PRESERVATIVE FREE 10 ML: 5 INJECTION INTRAVENOUS at 09:29

## 2021-11-25 RX ADMIN — INSULIN LISPRO 5 UNITS: 100 INJECTION, SOLUTION INTRAVENOUS; SUBCUTANEOUS at 11:30

## 2021-11-25 RX ADMIN — INSULIN LISPRO 5 UNITS: 100 INJECTION, SOLUTION INTRAVENOUS; SUBCUTANEOUS at 09:29

## 2021-11-25 RX ADMIN — AMLODIPINE BESYLATE 5 MG: 5 TABLET ORAL at 09:28

## 2021-11-25 RX ADMIN — ACETAMINOPHEN 650 MG: 325 TABLET, FILM COATED ORAL at 15:12

## 2021-11-25 RX ADMIN — ACETAMINOPHEN 650 MG: 325 TABLET, FILM COATED ORAL at 21:18

## 2021-11-25 RX ADMIN — CEFTRIAXONE SODIUM 2 G: 2 INJECTION, POWDER, FOR SOLUTION INTRAMUSCULAR; INTRAVENOUS at 15:12

## 2021-11-25 RX ADMIN — ASPIRIN 325 MG ORAL TABLET 325 MG: 325 PILL ORAL at 09:28

## 2021-11-25 RX ADMIN — INSULIN LISPRO 5 UNITS: 100 INJECTION, SOLUTION INTRAVENOUS; SUBCUTANEOUS at 17:54

## 2021-11-25 RX ADMIN — INSULIN LISPRO 4 UNITS: 100 INJECTION, SOLUTION INTRAVENOUS; SUBCUTANEOUS at 17:54

## 2021-11-25 RX ADMIN — LISINOPRIL 10 MG: 5 TABLET ORAL at 09:28

## 2021-11-25 RX ADMIN — HYDROCODONE BITARTRATE AND ACETAMINOPHEN 1 TABLET: 5; 325 TABLET ORAL at 22:31

## 2021-11-25 RX ADMIN — INSULIN GLARGINE 15 UNITS: 100 INJECTION, SOLUTION SUBCUTANEOUS at 09:29

## 2021-11-25 RX ADMIN — GABAPENTIN 300 MG: 300 CAPSULE ORAL at 09:28

## 2021-11-25 RX ADMIN — INSULIN GLARGINE 15 UNITS: 100 INJECTION, SOLUTION SUBCUTANEOUS at 21:26

## 2021-11-25 RX ADMIN — INSULIN LISPRO 8 UNITS: 100 INJECTION, SOLUTION INTRAVENOUS; SUBCUTANEOUS at 11:30

## 2021-11-26 LAB
GLUCOSE BLDC GLUCOMTR-MCNC: 155 MG/DL (ref 70–105)
GLUCOSE BLDC GLUCOMTR-MCNC: 162 MG/DL (ref 70–105)
GLUCOSE BLDC GLUCOMTR-MCNC: 203 MG/DL (ref 70–105)
GLUCOSE BLDC GLUCOMTR-MCNC: 220 MG/DL (ref 70–105)
MAGNESIUM SERPL-MCNC: 1.9 MG/DL (ref 1.6–2.4)
POTASSIUM SERPL-SCNC: 3.9 MMOL/L (ref 3.5–5.2)

## 2021-11-26 PROCEDURE — 97535 SELF CARE MNGMENT TRAINING: CPT

## 2021-11-26 PROCEDURE — 25010000002 CEFTRIAXONE PER 250 MG: Performed by: NURSE PRACTITIONER

## 2021-11-26 PROCEDURE — 97112 NEUROMUSCULAR REEDUCATION: CPT

## 2021-11-26 PROCEDURE — 63710000001 INSULIN GLARGINE PER 5 UNITS: Performed by: INTERNAL MEDICINE

## 2021-11-26 PROCEDURE — 83735 ASSAY OF MAGNESIUM: CPT | Performed by: PHYSICIAN ASSISTANT

## 2021-11-26 PROCEDURE — 63710000001 INSULIN LISPRO (HUMAN) PER 5 UNITS: Performed by: INTERNAL MEDICINE

## 2021-11-26 PROCEDURE — 82962 GLUCOSE BLOOD TEST: CPT

## 2021-11-26 PROCEDURE — 92526 ORAL FUNCTION THERAPY: CPT

## 2021-11-26 PROCEDURE — 84132 ASSAY OF SERUM POTASSIUM: CPT | Performed by: PHYSICIAN ASSISTANT

## 2021-11-26 RX ADMIN — SODIUM CHLORIDE, PRESERVATIVE FREE 10 ML: 5 INJECTION INTRAVENOUS at 09:06

## 2021-11-26 RX ADMIN — INSULIN GLARGINE 15 UNITS: 100 INJECTION, SOLUTION SUBCUTANEOUS at 20:48

## 2021-11-26 RX ADMIN — INSULIN GLARGINE 15 UNITS: 100 INJECTION, SOLUTION SUBCUTANEOUS at 09:04

## 2021-11-26 RX ADMIN — INSULIN LISPRO 5 UNITS: 100 INJECTION, SOLUTION INTRAVENOUS; SUBCUTANEOUS at 17:58

## 2021-11-26 RX ADMIN — INSULIN LISPRO 4 UNITS: 100 INJECTION, SOLUTION INTRAVENOUS; SUBCUTANEOUS at 17:57

## 2021-11-26 RX ADMIN — LISINOPRIL 10 MG: 5 TABLET ORAL at 09:06

## 2021-11-26 RX ADMIN — AMLODIPINE BESYLATE 5 MG: 5 TABLET ORAL at 09:06

## 2021-11-26 RX ADMIN — ATORVASTATIN CALCIUM 40 MG: 40 TABLET, FILM COATED ORAL at 20:43

## 2021-11-26 RX ADMIN — HYDROCODONE BITARTRATE AND ACETAMINOPHEN 1 TABLET: 5; 325 TABLET ORAL at 21:42

## 2021-11-26 RX ADMIN — GABAPENTIN 300 MG: 300 CAPSULE ORAL at 09:05

## 2021-11-26 RX ADMIN — INSULIN LISPRO 5 UNITS: 100 INJECTION, SOLUTION INTRAVENOUS; SUBCUTANEOUS at 12:15

## 2021-11-26 RX ADMIN — ASPIRIN 325 MG ORAL TABLET 325 MG: 325 PILL ORAL at 09:05

## 2021-11-26 RX ADMIN — SERTRALINE 150 MG: 100 TABLET, FILM COATED ORAL at 09:05

## 2021-11-26 RX ADMIN — GABAPENTIN 300 MG: 300 CAPSULE ORAL at 12:15

## 2021-11-26 RX ADMIN — HYDROCODONE BITARTRATE AND ACETAMINOPHEN 1 TABLET: 5; 325 TABLET ORAL at 09:12

## 2021-11-26 RX ADMIN — INSULIN LISPRO 8 UNITS: 100 INJECTION, SOLUTION INTRAVENOUS; SUBCUTANEOUS at 12:16

## 2021-11-26 RX ADMIN — SODIUM CHLORIDE, PRESERVATIVE FREE 10 ML: 5 INJECTION INTRAVENOUS at 20:44

## 2021-11-26 RX ADMIN — INSULIN LISPRO 5 UNITS: 100 INJECTION, SOLUTION INTRAVENOUS; SUBCUTANEOUS at 09:05

## 2021-11-26 RX ADMIN — CEFTRIAXONE SODIUM 2 G: 2 INJECTION, POWDER, FOR SOLUTION INTRAMUSCULAR; INTRAVENOUS at 14:47

## 2021-11-26 RX ADMIN — INSULIN LISPRO 4 UNITS: 100 INJECTION, SOLUTION INTRAVENOUS; SUBCUTANEOUS at 09:04

## 2021-11-26 RX ADMIN — GABAPENTIN 600 MG: 300 CAPSULE ORAL at 20:43

## 2021-11-26 RX ADMIN — HYDROCODONE BITARTRATE AND ACETAMINOPHEN 1 TABLET: 5; 325 TABLET ORAL at 15:31

## 2021-11-27 LAB
BACTERIA SPEC AEROBE CULT: ABNORMAL
GLUCOSE BLDC GLUCOMTR-MCNC: 164 MG/DL (ref 70–105)
GLUCOSE BLDC GLUCOMTR-MCNC: 180 MG/DL (ref 70–105)
GLUCOSE BLDC GLUCOMTR-MCNC: 191 MG/DL (ref 70–105)
GLUCOSE BLDC GLUCOMTR-MCNC: 213 MG/DL (ref 70–105)
MAGNESIUM SERPL-MCNC: 1.9 MG/DL (ref 1.6–2.4)
POTASSIUM SERPL-SCNC: 4 MMOL/L (ref 3.5–5.2)

## 2021-11-27 PROCEDURE — 83735 ASSAY OF MAGNESIUM: CPT | Performed by: PHYSICIAN ASSISTANT

## 2021-11-27 PROCEDURE — 97110 THERAPEUTIC EXERCISES: CPT

## 2021-11-27 PROCEDURE — 63710000001 INSULIN LISPRO (HUMAN) PER 5 UNITS: Performed by: INTERNAL MEDICINE

## 2021-11-27 PROCEDURE — 82962 GLUCOSE BLOOD TEST: CPT

## 2021-11-27 PROCEDURE — 97112 NEUROMUSCULAR REEDUCATION: CPT

## 2021-11-27 PROCEDURE — 63710000001 INSULIN GLARGINE PER 5 UNITS: Performed by: INTERNAL MEDICINE

## 2021-11-27 PROCEDURE — 84132 ASSAY OF SERUM POTASSIUM: CPT | Performed by: PHYSICIAN ASSISTANT

## 2021-11-27 RX ORDER — HYDRALAZINE HYDROCHLORIDE 25 MG/1
25 TABLET, FILM COATED ORAL EVERY 12 HOURS SCHEDULED
Status: DISCONTINUED | OUTPATIENT
Start: 2021-11-27 | End: 2021-11-29 | Stop reason: HOSPADM

## 2021-11-27 RX ADMIN — HYDROCODONE BITARTRATE AND ACETAMINOPHEN 1 TABLET: 5; 325 TABLET ORAL at 14:03

## 2021-11-27 RX ADMIN — ATORVASTATIN CALCIUM 40 MG: 40 TABLET, FILM COATED ORAL at 20:50

## 2021-11-27 RX ADMIN — INSULIN GLARGINE 15 UNITS: 100 INJECTION, SOLUTION SUBCUTANEOUS at 09:06

## 2021-11-27 RX ADMIN — GABAPENTIN 300 MG: 300 CAPSULE ORAL at 11:56

## 2021-11-27 RX ADMIN — HYDRALAZINE HYDROCHLORIDE 25 MG: 25 TABLET, FILM COATED ORAL at 09:18

## 2021-11-27 RX ADMIN — INSULIN LISPRO 4 UNITS: 100 INJECTION, SOLUTION INTRAVENOUS; SUBCUTANEOUS at 11:57

## 2021-11-27 RX ADMIN — ASPIRIN 325 MG ORAL TABLET 325 MG: 325 PILL ORAL at 09:04

## 2021-11-27 RX ADMIN — INSULIN LISPRO 4 UNITS: 100 INJECTION, SOLUTION INTRAVENOUS; SUBCUTANEOUS at 09:11

## 2021-11-27 RX ADMIN — INSULIN LISPRO 5 UNITS: 100 INJECTION, SOLUTION INTRAVENOUS; SUBCUTANEOUS at 18:40

## 2021-11-27 RX ADMIN — INSULIN LISPRO 5 UNITS: 100 INJECTION, SOLUTION INTRAVENOUS; SUBCUTANEOUS at 11:57

## 2021-11-27 RX ADMIN — SERTRALINE 150 MG: 100 TABLET, FILM COATED ORAL at 09:03

## 2021-11-27 RX ADMIN — LISINOPRIL 10 MG: 5 TABLET ORAL at 09:04

## 2021-11-27 RX ADMIN — AMLODIPINE BESYLATE 5 MG: 5 TABLET ORAL at 09:04

## 2021-11-27 RX ADMIN — SODIUM CHLORIDE, PRESERVATIVE FREE 10 ML: 5 INJECTION INTRAVENOUS at 09:18

## 2021-11-27 RX ADMIN — SODIUM CHLORIDE, PRESERVATIVE FREE 10 ML: 5 INJECTION INTRAVENOUS at 20:52

## 2021-11-27 RX ADMIN — INSULIN LISPRO 5 UNITS: 100 INJECTION, SOLUTION INTRAVENOUS; SUBCUTANEOUS at 09:11

## 2021-11-27 RX ADMIN — GABAPENTIN 600 MG: 300 CAPSULE ORAL at 20:50

## 2021-11-27 RX ADMIN — GABAPENTIN 300 MG: 300 CAPSULE ORAL at 09:04

## 2021-11-27 RX ADMIN — INSULIN GLARGINE 15 UNITS: 100 INJECTION, SOLUTION SUBCUTANEOUS at 20:52

## 2021-11-27 RX ADMIN — HYDRALAZINE HYDROCHLORIDE 25 MG: 25 TABLET, FILM COATED ORAL at 20:50

## 2021-11-27 RX ADMIN — INSULIN LISPRO 4 UNITS: 100 INJECTION, SOLUTION INTRAVENOUS; SUBCUTANEOUS at 18:40

## 2021-11-27 RX ADMIN — HYDROCODONE BITARTRATE AND ACETAMINOPHEN 1 TABLET: 5; 325 TABLET ORAL at 23:25

## 2021-11-28 LAB
GLUCOSE BLDC GLUCOMTR-MCNC: 148 MG/DL (ref 70–105)
GLUCOSE BLDC GLUCOMTR-MCNC: 170 MG/DL (ref 70–105)
GLUCOSE BLDC GLUCOMTR-MCNC: 198 MG/DL (ref 70–105)
GLUCOSE BLDC GLUCOMTR-MCNC: 235 MG/DL (ref 70–105)
MAGNESIUM SERPL-MCNC: 1.9 MG/DL (ref 1.6–2.4)
POTASSIUM SERPL-SCNC: 4 MMOL/L (ref 3.5–5.2)

## 2021-11-28 PROCEDURE — 97116 GAIT TRAINING THERAPY: CPT

## 2021-11-28 PROCEDURE — 83735 ASSAY OF MAGNESIUM: CPT | Performed by: PHYSICIAN ASSISTANT

## 2021-11-28 PROCEDURE — 97110 THERAPEUTIC EXERCISES: CPT

## 2021-11-28 PROCEDURE — 63710000001 INSULIN GLARGINE PER 5 UNITS: Performed by: INTERNAL MEDICINE

## 2021-11-28 PROCEDURE — 63710000001 INSULIN LISPRO (HUMAN) PER 5 UNITS: Performed by: INTERNAL MEDICINE

## 2021-11-28 PROCEDURE — 84132 ASSAY OF SERUM POTASSIUM: CPT | Performed by: PHYSICIAN ASSISTANT

## 2021-11-28 PROCEDURE — 82962 GLUCOSE BLOOD TEST: CPT

## 2021-11-28 RX ADMIN — INSULIN LISPRO 5 UNITS: 100 INJECTION, SOLUTION INTRAVENOUS; SUBCUTANEOUS at 08:37

## 2021-11-28 RX ADMIN — HYDROCODONE BITARTRATE AND ACETAMINOPHEN 1 TABLET: 5; 325 TABLET ORAL at 22:39

## 2021-11-28 RX ADMIN — SODIUM CHLORIDE, PRESERVATIVE FREE 10 ML: 5 INJECTION INTRAVENOUS at 08:40

## 2021-11-28 RX ADMIN — HYDRALAZINE HYDROCHLORIDE 25 MG: 25 TABLET, FILM COATED ORAL at 20:58

## 2021-11-28 RX ADMIN — INSULIN GLARGINE 15 UNITS: 100 INJECTION, SOLUTION SUBCUTANEOUS at 20:58

## 2021-11-28 RX ADMIN — INSULIN LISPRO 4 UNITS: 100 INJECTION, SOLUTION INTRAVENOUS; SUBCUTANEOUS at 17:11

## 2021-11-28 RX ADMIN — SODIUM CHLORIDE, PRESERVATIVE FREE 10 ML: 5 INJECTION INTRAVENOUS at 20:58

## 2021-11-28 RX ADMIN — ATORVASTATIN CALCIUM 40 MG: 40 TABLET, FILM COATED ORAL at 20:58

## 2021-11-28 RX ADMIN — INSULIN LISPRO 5 UNITS: 100 INJECTION, SOLUTION INTRAVENOUS; SUBCUTANEOUS at 17:11

## 2021-11-28 RX ADMIN — SERTRALINE 150 MG: 100 TABLET, FILM COATED ORAL at 08:39

## 2021-11-28 RX ADMIN — GABAPENTIN 300 MG: 300 CAPSULE ORAL at 12:27

## 2021-11-28 RX ADMIN — INSULIN GLARGINE 15 UNITS: 100 INJECTION, SOLUTION SUBCUTANEOUS at 08:36

## 2021-11-28 RX ADMIN — GABAPENTIN 600 MG: 300 CAPSULE ORAL at 20:57

## 2021-11-28 RX ADMIN — GABAPENTIN 300 MG: 300 CAPSULE ORAL at 08:39

## 2021-11-28 RX ADMIN — INSULIN LISPRO 5 UNITS: 100 INJECTION, SOLUTION INTRAVENOUS; SUBCUTANEOUS at 12:23

## 2021-11-28 RX ADMIN — AMLODIPINE BESYLATE 5 MG: 5 TABLET ORAL at 08:39

## 2021-11-28 RX ADMIN — LISINOPRIL 10 MG: 5 TABLET ORAL at 08:39

## 2021-11-28 RX ADMIN — HYDRALAZINE HYDROCHLORIDE 25 MG: 25 TABLET, FILM COATED ORAL at 08:39

## 2021-11-28 RX ADMIN — INSULIN LISPRO 8 UNITS: 100 INJECTION, SOLUTION INTRAVENOUS; SUBCUTANEOUS at 12:23

## 2021-11-28 RX ADMIN — ASPIRIN 325 MG ORAL TABLET 325 MG: 325 PILL ORAL at 08:39

## 2021-11-29 VITALS
TEMPERATURE: 97.6 F | BODY MASS INDEX: 43.4 KG/M2 | HEART RATE: 80 BPM | HEIGHT: 69 IN | SYSTOLIC BLOOD PRESSURE: 137 MMHG | DIASTOLIC BLOOD PRESSURE: 89 MMHG | OXYGEN SATURATION: 97 % | RESPIRATION RATE: 16 BRPM | WEIGHT: 293 LBS

## 2021-11-29 PROBLEM — N39.0 ACUTE UTI (URINARY TRACT INFECTION): Status: ACTIVE | Noted: 2021-11-29

## 2021-11-29 PROBLEM — E66.01 OBESITY, CLASS III, BMI 40-49.9 (MORBID OBESITY): Status: ACTIVE | Noted: 2021-11-23

## 2021-11-29 LAB
GLUCOSE BLDC GLUCOMTR-MCNC: 162 MG/DL (ref 70–105)
GLUCOSE BLDC GLUCOMTR-MCNC: 179 MG/DL (ref 70–105)
MAGNESIUM SERPL-MCNC: 2 MG/DL (ref 1.6–2.4)
POTASSIUM SERPL-SCNC: 4.1 MMOL/L (ref 3.5–5.2)

## 2021-11-29 PROCEDURE — 63710000001 INSULIN LISPRO (HUMAN) PER 5 UNITS: Performed by: INTERNAL MEDICINE

## 2021-11-29 PROCEDURE — 84132 ASSAY OF SERUM POTASSIUM: CPT | Performed by: PHYSICIAN ASSISTANT

## 2021-11-29 PROCEDURE — 92523 SPEECH SOUND LANG COMPREHEN: CPT

## 2021-11-29 PROCEDURE — 97110 THERAPEUTIC EXERCISES: CPT

## 2021-11-29 PROCEDURE — 92526 ORAL FUNCTION THERAPY: CPT

## 2021-11-29 PROCEDURE — 83735 ASSAY OF MAGNESIUM: CPT | Performed by: PHYSICIAN ASSISTANT

## 2021-11-29 PROCEDURE — 97535 SELF CARE MNGMENT TRAINING: CPT

## 2021-11-29 PROCEDURE — 97530 THERAPEUTIC ACTIVITIES: CPT

## 2021-11-29 PROCEDURE — 63710000001 INSULIN GLARGINE PER 5 UNITS: Performed by: INTERNAL MEDICINE

## 2021-11-29 PROCEDURE — 82962 GLUCOSE BLOOD TEST: CPT

## 2021-11-29 PROCEDURE — 97116 GAIT TRAINING THERAPY: CPT

## 2021-11-29 RX ORDER — ASPIRIN 325 MG
325 TABLET ORAL DAILY
Start: 2021-11-30 | End: 2022-02-14 | Stop reason: HOSPADM

## 2021-11-29 RX ORDER — HYDROCODONE BITARTRATE AND ACETAMINOPHEN 5; 325 MG/1; MG/1
1 TABLET ORAL EVERY 6 HOURS PRN
Start: 2021-11-29 | End: 2021-12-02

## 2021-11-29 RX ORDER — ACETAMINOPHEN 325 MG/1
650 TABLET ORAL EVERY 4 HOURS PRN
Start: 2021-11-29

## 2021-11-29 RX ORDER — HYDRALAZINE HYDROCHLORIDE 25 MG/1
25 TABLET, FILM COATED ORAL EVERY 12 HOURS SCHEDULED
Start: 2021-11-29 | End: 2021-11-29 | Stop reason: HOSPADM

## 2021-11-29 RX ORDER — INSULIN LISPRO 100 [IU]/ML
0-24 INJECTION, SOLUTION INTRAVENOUS; SUBCUTANEOUS
Refills: 12
Start: 2021-11-29 | End: 2022-02-14 | Stop reason: HOSPADM

## 2021-11-29 RX ORDER — INSULIN GLARGINE 100 [IU]/ML
15 INJECTION, SOLUTION SUBCUTANEOUS EVERY 12 HOURS SCHEDULED
Refills: 12
Start: 2021-11-29

## 2021-11-29 RX ORDER — ATORVASTATIN CALCIUM 40 MG/1
40 TABLET, FILM COATED ORAL NIGHTLY
Start: 2021-11-29

## 2021-11-29 RX ORDER — CHOLECALCIFEROL (VITAMIN D3) 125 MCG
5 CAPSULE ORAL NIGHTLY PRN
Start: 2021-11-29

## 2021-11-29 RX ORDER — AMOXICILLIN 500 MG/1
1000 CAPSULE ORAL 2 TIMES DAILY
Start: 2021-11-29 | End: 2021-12-04

## 2021-11-29 RX ORDER — LISINOPRIL 20 MG/1
20 TABLET ORAL DAILY
Start: 2021-11-29 | End: 2022-02-14 | Stop reason: HOSPADM

## 2021-11-29 RX ORDER — AMLODIPINE BESYLATE 10 MG/1
10 TABLET ORAL
Start: 2021-11-30

## 2021-11-29 RX ORDER — LANOLIN ALCOHOL/MO/W.PET/CERES
1000 CREAM (GRAM) TOPICAL DAILY
Start: 2021-11-29

## 2021-11-29 RX ORDER — OLANZAPINE 10 MG/2ML
1 INJECTION, POWDER, LYOPHILIZED, FOR SOLUTION INTRAMUSCULAR
Start: 2021-11-29

## 2021-11-29 RX ORDER — INSULIN LISPRO 100 [IU]/ML
5 INJECTION, SOLUTION INTRAVENOUS; SUBCUTANEOUS
Refills: 12
Start: 2021-11-29 | End: 2022-02-14 | Stop reason: HOSPADM

## 2021-11-29 RX ORDER — INSULIN LISPRO 100 [IU]/ML
0-24 INJECTION, SOLUTION INTRAVENOUS; SUBCUTANEOUS AS NEEDED
Refills: 12
Start: 2021-11-29 | End: 2022-02-12 | Stop reason: SDUPTHER

## 2021-11-29 RX ORDER — NICOTINE POLACRILEX 4 MG
15 LOZENGE BUCCAL
Start: 2021-11-29 | End: 2022-02-14 | Stop reason: HOSPADM

## 2021-11-29 RX ADMIN — GABAPENTIN 300 MG: 300 CAPSULE ORAL at 08:41

## 2021-11-29 RX ADMIN — INSULIN LISPRO 4 UNITS: 100 INJECTION, SOLUTION INTRAVENOUS; SUBCUTANEOUS at 08:40

## 2021-11-29 RX ADMIN — LISINOPRIL 10 MG: 5 TABLET ORAL at 08:41

## 2021-11-29 RX ADMIN — INSULIN LISPRO 5 UNITS: 100 INJECTION, SOLUTION INTRAVENOUS; SUBCUTANEOUS at 12:19

## 2021-11-29 RX ADMIN — HYDRALAZINE HYDROCHLORIDE 25 MG: 25 TABLET, FILM COATED ORAL at 08:41

## 2021-11-29 RX ADMIN — INSULIN LISPRO 4 UNITS: 100 INJECTION, SOLUTION INTRAVENOUS; SUBCUTANEOUS at 12:19

## 2021-11-29 RX ADMIN — INSULIN LISPRO 5 UNITS: 100 INJECTION, SOLUTION INTRAVENOUS; SUBCUTANEOUS at 08:40

## 2021-11-29 RX ADMIN — SERTRALINE 150 MG: 100 TABLET, FILM COATED ORAL at 08:40

## 2021-11-29 RX ADMIN — INSULIN GLARGINE 15 UNITS: 100 INJECTION, SOLUTION SUBCUTANEOUS at 08:42

## 2021-11-29 RX ADMIN — SODIUM CHLORIDE, PRESERVATIVE FREE 10 ML: 5 INJECTION INTRAVENOUS at 08:41

## 2021-11-29 RX ADMIN — GABAPENTIN 300 MG: 300 CAPSULE ORAL at 12:19

## 2021-11-29 RX ADMIN — ASPIRIN 325 MG ORAL TABLET 325 MG: 325 PILL ORAL at 08:41

## 2021-11-29 RX ADMIN — AMLODIPINE BESYLATE 5 MG: 5 TABLET ORAL at 08:41

## 2022-01-01 ENCOUNTER — LAB REQUISITION (OUTPATIENT)
Dept: LAB | Facility: HOSPITAL | Age: 67
End: 2022-01-01

## 2022-01-01 DIAGNOSIS — I69.354 HEMIPLEGIA AND HEMIPARESIS FOLLOWING CEREBRAL INFARCTION AFFECTING LEFT NON-DOMINANT SIDE: ICD-10-CM

## 2022-01-04 ENCOUNTER — LAB REQUISITION (OUTPATIENT)
Dept: LAB | Facility: HOSPITAL | Age: 67
End: 2022-01-04

## 2022-01-04 DIAGNOSIS — R30.9 PAINFUL MICTURITION, UNSPECIFIED: ICD-10-CM

## 2022-01-04 DIAGNOSIS — R39.11 HESITANCY OF MICTURITION: ICD-10-CM

## 2022-01-04 LAB
BACTERIA UR QL AUTO: ABNORMAL /HPF
BILIRUB UR QL STRIP: NEGATIVE
CLARITY UR: CLEAR
COLOR UR: YELLOW
GLUCOSE UR STRIP-MCNC: NEGATIVE MG/DL
HGB UR QL STRIP.AUTO: ABNORMAL
HYALINE CASTS UR QL AUTO: ABNORMAL /LPF
KETONES UR QL STRIP: ABNORMAL
LEUKOCYTE ESTERASE UR QL STRIP.AUTO: NEGATIVE
NITRITE UR QL STRIP: NEGATIVE
PH UR STRIP.AUTO: 6.5 [PH] (ref 5–8)
PROT UR QL STRIP: ABNORMAL
RBC # UR STRIP: ABNORMAL /HPF
REF LAB TEST METHOD: ABNORMAL
SP GR UR STRIP: >=1.03 (ref 1–1.03)
SQUAMOUS #/AREA URNS HPF: ABNORMAL /HPF
UROBILINOGEN UR QL STRIP: ABNORMAL
WBC # UR STRIP: ABNORMAL /HPF

## 2022-01-04 PROCEDURE — 81001 URINALYSIS AUTO W/SCOPE: CPT | Performed by: FAMILY MEDICINE

## 2022-02-11 ENCOUNTER — APPOINTMENT (OUTPATIENT)
Dept: INTERVENTIONAL RADIOLOGY/VASCULAR | Facility: HOSPITAL | Age: 67
End: 2022-02-11

## 2022-02-11 ENCOUNTER — HOSPITAL ENCOUNTER (INPATIENT)
Facility: HOSPITAL | Age: 67
LOS: 2 days | Discharge: HOME HEALTH | End: 2022-02-14
Attending: EMERGENCY MEDICINE | Admitting: INTERNAL MEDICINE

## 2022-02-11 ENCOUNTER — APPOINTMENT (OUTPATIENT)
Dept: CT IMAGING | Facility: HOSPITAL | Age: 67
End: 2022-02-11
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
## 2022-02-11 DIAGNOSIS — S37.012A HEMATOMA OF LEFT KIDNEY, INITIAL ENCOUNTER: ICD-10-CM

## 2022-02-11 PROBLEM — S37.019A KIDNEY HEMATOMA: Status: ACTIVE | Noted: 2022-02-11

## 2022-02-11 PROBLEM — E11.69 TYPE 2 DIABETES MELLITUS WITH HYPERLIPIDEMIA (HCC): Status: ACTIVE | Noted: 2022-02-11

## 2022-02-11 PROBLEM — E11.42 DIABETES MELLITUS WITH POLYNEUROPATHY (HCC): Status: ACTIVE | Noted: 2022-02-11

## 2022-02-11 PROBLEM — E78.5 TYPE 2 DIABETES MELLITUS WITH HYPERLIPIDEMIA: Status: ACTIVE | Noted: 2022-02-11

## 2022-02-11 PROBLEM — E11.9 DIABETES MELLITUS WITH COINCIDENT HYPERTENSION: Status: ACTIVE | Noted: 2022-02-11

## 2022-02-11 PROBLEM — I10 DIABETES MELLITUS WITH COINCIDENT HYPERTENSION (HCC): Status: ACTIVE | Noted: 2022-02-11

## 2022-02-11 LAB
ALBUMIN SERPL-MCNC: 3.5 G/DL (ref 3.5–5.2)
ALBUMIN/GLOB SERPL: 1.4 G/DL
ALP SERPL-CCNC: 77 U/L (ref 39–117)
ALT SERPL W P-5'-P-CCNC: 14 U/L (ref 1–33)
AMYLASE SERPL-CCNC: 56 U/L (ref 28–100)
ANION GAP SERPL CALCULATED.3IONS-SCNC: 14 MMOL/L (ref 5–15)
APTT PPP: 24.2 SECONDS (ref 24–31)
AST SERPL-CCNC: 13 U/L (ref 1–32)
BACTERIA UR QL AUTO: ABNORMAL /HPF
BASOPHILS # BLD AUTO: 0 10*3/MM3 (ref 0–0.2)
BASOPHILS NFR BLD AUTO: 0.3 % (ref 0–1.5)
BILIRUB SERPL-MCNC: 0.3 MG/DL (ref 0–1.2)
BILIRUB UR QL STRIP: NEGATIVE
BUN SERPL-MCNC: 28 MG/DL (ref 8–23)
BUN/CREAT SERPL: 34.6 (ref 7–25)
CALCIUM SPEC-SCNC: 8.9 MG/DL (ref 8.6–10.5)
CHLORIDE SERPL-SCNC: 103 MMOL/L (ref 98–107)
CK SERPL-CCNC: 122 U/L (ref 20–180)
CLARITY UR: CLEAR
CO2 SERPL-SCNC: 21 MMOL/L (ref 22–29)
COLOR UR: YELLOW
CREAT SERPL-MCNC: 0.81 MG/DL (ref 0.57–1)
DEPRECATED RDW RBC AUTO: 42.9 FL (ref 37–54)
EOSINOPHIL # BLD AUTO: 0 10*3/MM3 (ref 0–0.4)
EOSINOPHIL NFR BLD AUTO: 0.3 % (ref 0.3–6.2)
ERYTHROCYTE [DISTWIDTH] IN BLOOD BY AUTOMATED COUNT: 13.6 % (ref 12.3–15.4)
GFR SERPL CREATININE-BSD FRML MDRD: 71 ML/MIN/1.73
GLOBULIN UR ELPH-MCNC: 2.5 GM/DL
GLUCOSE BLDC GLUCOMTR-MCNC: 305 MG/DL (ref 70–105)
GLUCOSE BLDC GLUCOMTR-MCNC: 346 MG/DL (ref 70–105)
GLUCOSE SERPL-MCNC: 288 MG/DL (ref 65–99)
GLUCOSE UR STRIP-MCNC: ABNORMAL MG/DL
HCT VFR BLD AUTO: 31.5 % (ref 34–46.6)
HCT VFR BLD AUTO: 31.9 % (ref 34–46.6)
HCT VFR BLD AUTO: 31.9 % (ref 34–46.6)
HCT VFR BLD AUTO: 33.1 % (ref 34–46.6)
HCT VFR BLD AUTO: 34.4 % (ref 34–46.6)
HGB BLD-MCNC: 10.6 G/DL (ref 12–15.9)
HGB BLD-MCNC: 10.7 G/DL (ref 12–15.9)
HGB BLD-MCNC: 10.7 G/DL (ref 12–15.9)
HGB BLD-MCNC: 11 G/DL (ref 12–15.9)
HGB BLD-MCNC: 11.5 G/DL (ref 12–15.9)
HGB UR QL STRIP.AUTO: ABNORMAL
HYALINE CASTS UR QL AUTO: ABNORMAL /LPF
INR PPP: 0.98 (ref 0.93–1.1)
KETONES UR QL STRIP: ABNORMAL
LEUKOCYTE ESTERASE UR QL STRIP.AUTO: ABNORMAL
LIPASE SERPL-CCNC: 51 U/L (ref 13–60)
LYMPHOCYTES # BLD AUTO: 1.5 10*3/MM3 (ref 0.7–3.1)
LYMPHOCYTES NFR BLD AUTO: 9.5 % (ref 19.6–45.3)
MCH RBC QN AUTO: 29.6 PG (ref 26.6–33)
MCHC RBC AUTO-ENTMCNC: 33.4 G/DL (ref 31.5–35.7)
MCV RBC AUTO: 88.7 FL (ref 79–97)
MONOCYTES # BLD AUTO: 0.5 10*3/MM3 (ref 0.1–0.9)
MONOCYTES NFR BLD AUTO: 3.2 % (ref 5–12)
NEUTROPHILS NFR BLD AUTO: 13.5 10*3/MM3 (ref 1.7–7)
NEUTROPHILS NFR BLD AUTO: 86.7 % (ref 42.7–76)
NITRITE UR QL STRIP: NEGATIVE
NRBC BLD AUTO-RTO: 0.1 /100 WBC (ref 0–0.2)
PH UR STRIP.AUTO: <=5 [PH] (ref 5–8)
PLATELET # BLD AUTO: 351 10*3/MM3 (ref 140–450)
PMV BLD AUTO: 7.2 FL (ref 6–12)
POTASSIUM SERPL-SCNC: 4.3 MMOL/L (ref 3.5–5.2)
PROT SERPL-MCNC: 6 G/DL (ref 6–8.5)
PROT UR QL STRIP: ABNORMAL
PROTHROMBIN TIME: 10.9 SECONDS (ref 9.6–11.7)
RBC # BLD AUTO: 3.88 10*6/MM3 (ref 3.77–5.28)
RBC # UR STRIP: ABNORMAL /HPF
REF LAB TEST METHOD: ABNORMAL
SARS-COV-2 RNA PNL SPEC NAA+PROBE: NOT DETECTED
SODIUM SERPL-SCNC: 138 MMOL/L (ref 136–145)
SP GR UR STRIP: 1.08 (ref 1–1.03)
SQUAMOUS #/AREA URNS HPF: ABNORMAL /HPF
UROBILINOGEN UR QL STRIP: ABNORMAL
WBC # UR STRIP: ABNORMAL /HPF
WBC NRBC COR # BLD: 15.6 10*3/MM3 (ref 3.4–10.8)

## 2022-02-11 PROCEDURE — 81001 URINALYSIS AUTO W/SCOPE: CPT | Performed by: EMERGENCY MEDICINE

## 2022-02-11 PROCEDURE — C1894 INTRO/SHEATH, NON-LASER: HCPCS

## 2022-02-11 PROCEDURE — 74177 CT ABD & PELVIS W/CONTRAST: CPT

## 2022-02-11 PROCEDURE — 25010000002 ONDANSETRON PER 1 MG: Performed by: RADIOLOGY

## 2022-02-11 PROCEDURE — 0 IOPAMIDOL PER 1 ML: Performed by: EMERGENCY MEDICINE

## 2022-02-11 PROCEDURE — B4171ZZ FLUOROSCOPY OF LEFT RENAL ARTERY USING LOW OSMOLAR CONTRAST: ICD-10-PCS | Performed by: RADIOLOGY

## 2022-02-11 PROCEDURE — 25010000002 ONDANSETRON PER 1 MG: Performed by: EMERGENCY MEDICINE

## 2022-02-11 PROCEDURE — C1769 GUIDE WIRE: HCPCS

## 2022-02-11 PROCEDURE — 80053 COMPREHEN METABOLIC PANEL: CPT | Performed by: EMERGENCY MEDICINE

## 2022-02-11 PROCEDURE — 85730 THROMBOPLASTIN TIME PARTIAL: CPT | Performed by: RADIOLOGY

## 2022-02-11 PROCEDURE — 76937 US GUIDE VASCULAR ACCESS: CPT

## 2022-02-11 PROCEDURE — 25010000002 IOPAMIDOL 61 % SOLUTION: Performed by: INTERNAL MEDICINE

## 2022-02-11 PROCEDURE — 83690 ASSAY OF LIPASE: CPT | Performed by: EMERGENCY MEDICINE

## 2022-02-11 PROCEDURE — 36415 COLL VENOUS BLD VENIPUNCTURE: CPT | Performed by: UROLOGY

## 2022-02-11 PROCEDURE — 25010000002 HYDRALAZINE PER 20 MG: Performed by: RADIOLOGY

## 2022-02-11 PROCEDURE — 76942 ECHO GUIDE FOR BIOPSY: CPT

## 2022-02-11 PROCEDURE — 71260 CT THORAX DX C+: CPT

## 2022-02-11 PROCEDURE — 0 MORPHINE SULFATE 4 MG/ML SOLUTION: Performed by: INTERNAL MEDICINE

## 2022-02-11 PROCEDURE — 25010000002 CEFTRIAXONE PER 250 MG: Performed by: INTERNAL MEDICINE

## 2022-02-11 PROCEDURE — 87635 SARS-COV-2 COVID-19 AMP PRB: CPT | Performed by: EMERGENCY MEDICINE

## 2022-02-11 PROCEDURE — C1887 CATHETER, GUIDING: HCPCS

## 2022-02-11 PROCEDURE — 85018 HEMOGLOBIN: CPT | Performed by: UROLOGY

## 2022-02-11 PROCEDURE — 63710000001 INSULIN LISPRO (HUMAN) PER 5 UNITS: Performed by: NURSE PRACTITIONER

## 2022-02-11 PROCEDURE — 99285 EMERGENCY DEPT VISIT HI MDM: CPT

## 2022-02-11 PROCEDURE — 25010000002 MIDAZOLAM PER 1 MG: Performed by: RADIOLOGY

## 2022-02-11 PROCEDURE — 82550 ASSAY OF CK (CPK): CPT | Performed by: NURSE PRACTITIONER

## 2022-02-11 PROCEDURE — 99153 MOD SED SAME PHYS/QHP EA: CPT

## 2022-02-11 PROCEDURE — 25010000002 FENTANYL CITRATE (PF) 50 MCG/ML SOLUTION: Performed by: RADIOLOGY

## 2022-02-11 PROCEDURE — 0 LIDOCAINE 1 % SOLUTION: Performed by: RADIOLOGY

## 2022-02-11 PROCEDURE — 85610 PROTHROMBIN TIME: CPT | Performed by: RADIOLOGY

## 2022-02-11 PROCEDURE — 82962 GLUCOSE BLOOD TEST: CPT

## 2022-02-11 PROCEDURE — 85025 COMPLETE CBC W/AUTO DIFF WBC: CPT | Performed by: EMERGENCY MEDICINE

## 2022-02-11 PROCEDURE — 0 MORPHINE SULFATE 4 MG/ML SOLUTION: Performed by: EMERGENCY MEDICINE

## 2022-02-11 PROCEDURE — 85014 HEMATOCRIT: CPT | Performed by: UROLOGY

## 2022-02-11 PROCEDURE — 82150 ASSAY OF AMYLASE: CPT | Performed by: EMERGENCY MEDICINE

## 2022-02-11 PROCEDURE — 99152 MOD SED SAME PHYS/QHP 5/>YRS: CPT

## 2022-02-11 RX ORDER — SODIUM CHLORIDE 0.9 % (FLUSH) 0.9 %
10 SYRINGE (ML) INJECTION EVERY 12 HOURS SCHEDULED
Status: DISCONTINUED | OUTPATIENT
Start: 2022-02-11 | End: 2022-02-14 | Stop reason: HOSPADM

## 2022-02-11 RX ORDER — MORPHINE SULFATE 4 MG/ML
4 INJECTION, SOLUTION INTRAMUSCULAR; INTRAVENOUS
Status: DISCONTINUED | OUTPATIENT
Start: 2022-02-11 | End: 2022-02-14 | Stop reason: HOSPADM

## 2022-02-11 RX ORDER — PANTOPRAZOLE SODIUM 40 MG/1
40 TABLET, DELAYED RELEASE ORAL
Status: DISCONTINUED | OUTPATIENT
Start: 2022-02-12 | End: 2022-02-14 | Stop reason: HOSPADM

## 2022-02-11 RX ORDER — HYDRALAZINE HYDROCHLORIDE 20 MG/ML
INJECTION INTRAMUSCULAR; INTRAVENOUS
Status: COMPLETED | OUTPATIENT
Start: 2022-02-11 | End: 2022-02-11

## 2022-02-11 RX ORDER — CHOLECALCIFEROL (VITAMIN D3) 125 MCG
5 CAPSULE ORAL NIGHTLY PRN
Status: DISCONTINUED | OUTPATIENT
Start: 2022-02-11 | End: 2022-02-14 | Stop reason: HOSPADM

## 2022-02-11 RX ORDER — HYDROCODONE BITARTRATE AND ACETAMINOPHEN 7.5; 325 MG/1; MG/1
1 TABLET ORAL EVERY 6 HOURS PRN
Status: DISCONTINUED | OUTPATIENT
Start: 2022-02-11 | End: 2022-02-14 | Stop reason: HOSPADM

## 2022-02-11 RX ORDER — FENTANYL CITRATE 50 UG/ML
INJECTION, SOLUTION INTRAMUSCULAR; INTRAVENOUS
Status: COMPLETED | OUTPATIENT
Start: 2022-02-11 | End: 2022-02-11

## 2022-02-11 RX ORDER — ATORVASTATIN CALCIUM 40 MG/1
40 TABLET, FILM COATED ORAL NIGHTLY
Status: DISCONTINUED | OUTPATIENT
Start: 2022-02-11 | End: 2022-02-14 | Stop reason: HOSPADM

## 2022-02-11 RX ORDER — DEXTROSE MONOHYDRATE 25 G/50ML
25 INJECTION, SOLUTION INTRAVENOUS
Status: DISCONTINUED | OUTPATIENT
Start: 2022-02-11 | End: 2022-02-14 | Stop reason: HOSPADM

## 2022-02-11 RX ORDER — SODIUM CHLORIDE 0.9 % (FLUSH) 0.9 %
10 SYRINGE (ML) INJECTION AS NEEDED
Status: DISCONTINUED | OUTPATIENT
Start: 2022-02-11 | End: 2022-02-14 | Stop reason: HOSPADM

## 2022-02-11 RX ORDER — ONDANSETRON 2 MG/ML
8 INJECTION INTRAMUSCULAR; INTRAVENOUS ONCE
Status: COMPLETED | OUTPATIENT
Start: 2022-02-11 | End: 2022-02-11

## 2022-02-11 RX ORDER — LISINOPRIL 20 MG/1
20 TABLET ORAL DAILY
Status: DISCONTINUED | OUTPATIENT
Start: 2022-02-11 | End: 2022-02-12

## 2022-02-11 RX ORDER — MORPHINE SULFATE 4 MG/ML
4 INJECTION, SOLUTION INTRAMUSCULAR; INTRAVENOUS ONCE
Status: COMPLETED | OUTPATIENT
Start: 2022-02-11 | End: 2022-02-11

## 2022-02-11 RX ORDER — ONDANSETRON 2 MG/ML
4 INJECTION INTRAMUSCULAR; INTRAVENOUS EVERY 6 HOURS PRN
Status: DISCONTINUED | OUTPATIENT
Start: 2022-02-11 | End: 2022-02-14 | Stop reason: HOSPADM

## 2022-02-11 RX ORDER — LIDOCAINE HYDROCHLORIDE 10 MG/ML
INJECTION, SOLUTION INFILTRATION; PERINEURAL
Status: COMPLETED | OUTPATIENT
Start: 2022-02-11 | End: 2022-02-11

## 2022-02-11 RX ORDER — ONDANSETRON 4 MG/1
4 TABLET, FILM COATED ORAL EVERY 6 HOURS PRN
Status: DISCONTINUED | OUTPATIENT
Start: 2022-02-11 | End: 2022-02-14 | Stop reason: HOSPADM

## 2022-02-11 RX ORDER — GABAPENTIN 300 MG/1
300 CAPSULE ORAL
Status: DISCONTINUED | OUTPATIENT
Start: 2022-02-12 | End: 2022-02-14 | Stop reason: HOSPADM

## 2022-02-11 RX ORDER — INSULIN LISPRO 100 [IU]/ML
0-9 INJECTION, SOLUTION INTRAVENOUS; SUBCUTANEOUS
Status: DISCONTINUED | OUTPATIENT
Start: 2022-02-11 | End: 2022-02-14 | Stop reason: HOSPADM

## 2022-02-11 RX ORDER — AMLODIPINE BESYLATE 5 MG/1
10 TABLET ORAL
Status: DISCONTINUED | OUTPATIENT
Start: 2022-02-11 | End: 2022-02-12

## 2022-02-11 RX ORDER — HYDRALAZINE HYDROCHLORIDE 20 MG/ML
10 INJECTION INTRAMUSCULAR; INTRAVENOUS EVERY 6 HOURS PRN
Status: DISCONTINUED | OUTPATIENT
Start: 2022-02-11 | End: 2022-02-14 | Stop reason: HOSPADM

## 2022-02-11 RX ORDER — GABAPENTIN 300 MG/1
600 CAPSULE ORAL NIGHTLY
Status: DISCONTINUED | OUTPATIENT
Start: 2022-02-11 | End: 2022-02-13

## 2022-02-11 RX ORDER — MIDAZOLAM HYDROCHLORIDE 1 MG/ML
INJECTION INTRAMUSCULAR; INTRAVENOUS
Status: COMPLETED | OUTPATIENT
Start: 2022-02-11 | End: 2022-02-11

## 2022-02-11 RX ORDER — METFORMIN HYDROCHLORIDE 500 MG/1
1000 TABLET, EXTENDED RELEASE ORAL 2 TIMES DAILY
Status: DISCONTINUED | OUTPATIENT
Start: 2022-02-11 | End: 2022-02-12

## 2022-02-11 RX ORDER — NICOTINE POLACRILEX 4 MG
15 LOZENGE BUCCAL
Status: DISCONTINUED | OUTPATIENT
Start: 2022-02-11 | End: 2022-02-14 | Stop reason: HOSPADM

## 2022-02-11 RX ORDER — OLANZAPINE 10 MG/2ML
1 INJECTION, POWDER, LYOPHILIZED, FOR SOLUTION INTRAMUSCULAR
Status: DISCONTINUED | OUTPATIENT
Start: 2022-02-11 | End: 2022-02-14 | Stop reason: HOSPADM

## 2022-02-11 RX ORDER — INSULIN LISPRO 100 [IU]/ML
0-9 INJECTION, SOLUTION INTRAVENOUS; SUBCUTANEOUS AS NEEDED
Status: DISCONTINUED | OUTPATIENT
Start: 2022-02-11 | End: 2022-02-14 | Stop reason: HOSPADM

## 2022-02-11 RX ORDER — ONDANSETRON 2 MG/ML
INJECTION INTRAMUSCULAR; INTRAVENOUS
Status: COMPLETED | OUTPATIENT
Start: 2022-02-11 | End: 2022-02-11

## 2022-02-11 RX ADMIN — SERTRALINE 150 MG: 100 TABLET, FILM COATED ORAL at 21:11

## 2022-02-11 RX ADMIN — MORPHINE SULFATE 4 MG: 4 INJECTION INTRAVENOUS at 10:37

## 2022-02-11 RX ADMIN — HYDROCODONE BITARTRATE AND ACETAMINOPHEN 1 TABLET: 7.5; 325 TABLET ORAL at 23:13

## 2022-02-11 RX ADMIN — FENTANYL CITRATE 100 MCG: 50 INJECTION, SOLUTION INTRAMUSCULAR; INTRAVENOUS at 15:05

## 2022-02-11 RX ADMIN — Medication 10 ML: at 20:17

## 2022-02-11 RX ADMIN — MORPHINE SULFATE 4 MG: 4 INJECTION INTRAVENOUS at 18:12

## 2022-02-11 RX ADMIN — INSULIN LISPRO 8 UNITS: 100 INJECTION, SOLUTION INTRAVENOUS; SUBCUTANEOUS at 18:21

## 2022-02-11 RX ADMIN — ONDANSETRON 8 MG: 2 INJECTION INTRAMUSCULAR; INTRAVENOUS at 10:37

## 2022-02-11 RX ADMIN — MIDAZOLAM 0.5 MG: 1 INJECTION INTRAMUSCULAR; INTRAVENOUS at 15:09

## 2022-02-11 RX ADMIN — LISINOPRIL 20 MG: 20 TABLET ORAL at 18:21

## 2022-02-11 RX ADMIN — AMLODIPINE BESYLATE 10 MG: 5 TABLET ORAL at 18:21

## 2022-02-11 RX ADMIN — Medication 10 ML: at 13:41

## 2022-02-11 RX ADMIN — GABAPENTIN 600 MG: 300 CAPSULE ORAL at 20:17

## 2022-02-11 RX ADMIN — MORPHINE SULFATE 4 MG: 4 INJECTION INTRAVENOUS at 13:01

## 2022-02-11 RX ADMIN — IOPAMIDOL 45 ML: 612 INJECTION, SOLUTION INTRAVENOUS at 15:30

## 2022-02-11 RX ADMIN — SODIUM CHLORIDE, PRESERVATIVE FREE 10 ML: 5 INJECTION INTRAVENOUS at 21:24

## 2022-02-11 RX ADMIN — LIDOCAINE HYDROCHLORIDE 3 ML: 10 INJECTION, SOLUTION INFILTRATION; PERINEURAL at 15:30

## 2022-02-11 RX ADMIN — Medication 10 ML: at 13:01

## 2022-02-11 RX ADMIN — LIDOCAINE HYDROCHLORIDE 5 ML: 10 INJECTION, SOLUTION INFILTRATION; PERINEURAL at 15:09

## 2022-02-11 RX ADMIN — HYDRALAZINE HYDROCHLORIDE 10 MG: 20 INJECTION INTRAMUSCULAR; INTRAVENOUS at 15:35

## 2022-02-11 RX ADMIN — CEFTRIAXONE 1 G: 10 INJECTION, POWDER, FOR SOLUTION INTRAVENOUS at 16:32

## 2022-02-11 RX ADMIN — IOPAMIDOL 100 ML: 755 INJECTION, SOLUTION INTRAVENOUS at 12:13

## 2022-02-11 RX ADMIN — ATORVASTATIN CALCIUM 40 MG: 40 TABLET, FILM COATED ORAL at 20:17

## 2022-02-11 RX ADMIN — HYDRALAZINE HYDROCHLORIDE 10 MG: 20 INJECTION INTRAMUSCULAR; INTRAVENOUS at 15:28

## 2022-02-11 RX ADMIN — ONDANSETRON 4 MG: 2 INJECTION INTRAMUSCULAR; INTRAVENOUS at 14:46

## 2022-02-11 NOTE — ED PROVIDER NOTES
Subjective   Chief complaint fall with left rib pain    History of present illness this is a 66-year-old female history of diabetes hypertension TIA hyperlipidemia who states that this morning she tripped and fell hitting her left ribs on a sewing machine.  She did not hit her head denies headache neck or back pain.  The pain in her left ribs are sharp stabbing worse when she moves and takes deep breath ongoing for the last few hours.  Denies any dizziness or syncope.  No shortness of breath but hurts to take a deep breath.  10 out of 10.  Denies any recent illness no fever chills sweats cough congestion vomiting or diarrhea or foreign travels.  Patient does not take anticoagulants.  No other complaints or associated symptoms at this time          Review of Systems   Constitutional: Negative for chills and fever.   HENT: Negative for congestion and sinus pressure.    Eyes: Negative for photophobia and visual disturbance.   Respiratory: Negative for chest tightness and shortness of breath.    Cardiovascular: Positive for chest pain. Negative for leg swelling.   Gastrointestinal: Positive for abdominal pain. Negative for vomiting.   Endocrine: Negative for cold intolerance and heat intolerance.   Genitourinary: Negative for difficulty urinating and dysuria.   Musculoskeletal: Negative for arthralgias, back pain and neck pain.   Skin: Negative for color change and rash.   Neurological: Negative for dizziness, light-headedness and headaches.   Psychiatric/Behavioral: Negative for agitation and behavioral problems.       Past Medical History:   Diagnosis Date   • Diabetes mellitus (HCC)    • Hyperlipidemia    • Hypertension    • TIA (transient ischemic attack)        No Known Allergies    Past Surgical History:   Procedure Laterality Date   • CERVICAL FUSION     •  SECTION         Family History   Problem Relation Age of Onset   • Heart disease Father        Social History     Socioeconomic History   • Marital  status:    Tobacco Use   • Smoking status: Former Smoker   • Smokeless tobacco: Never Used   Substance and Sexual Activity   • Alcohol use: Yes     Comment: once or twice a year   • Drug use: Never   • Sexual activity: Defer     Prior to Admission medications    Medication Sig Start Date End Date Taking? Authorizing Provider   acetaminophen (TYLENOL) 325 MG tablet Take 2 tablets by mouth Every 4 (Four) Hours As Needed for Mild Pain . 11/29/21   Aracelis King MD   amLODIPine (NORVASC) 10 MG tablet Take 1 tablet by mouth Daily. 11/30/21   Aracelis King MD   aspirin 325 MG tablet Take 1 tablet by mouth Daily. 11/30/21   Aracelis King MD   atorvastatin (LIPITOR) 40 MG tablet Take 1 tablet by mouth Every Night. 11/29/21   Aracelis King MD   dextrose (GLUTOSE) 40 % gel Take 15 g by mouth Every 15 (Fifteen) Minutes As Needed for Low Blood Sugar (Blood sugar less than 70). 11/29/21   Aracelis King MD   gabapentin (NEURONTIN) 300 MG capsule Take 300 mg by mouth Daily With Breakfast & Lunch.    Provider, MD Marcel   gabapentin (NEURONTIN) 300 MG capsule Take 600 mg by mouth Every Night.    ProviderMarcel MD   glucagon (human recombinant) 1 mg/mL in sterile water (preservative free) injection injection Inject 1 mL under the skin into the appropriate area as directed Every 15 (Fifteen) Minutes As Needed (Blood Glucose Less Than 70). 11/29/21   Aracelis King MD   insulin glargine (LANTUS, SEMGLEE) 100 UNIT/ML injection Inject 15 Units under the skin into the appropriate area as directed Every 12 (Twelve) Hours. 11/29/21   Aracelis King MD   insulin lispro (ADMELOG) 100 UNIT/ML injection Inject 0-24 Units under the skin into the appropriate area as directed 3 (Three) Times a Day Before Meals. 11/29/21   Aracelis King MD   insulin lispro (ADMELOG) 100 UNIT/ML injection Inject 0-24 Units under the skin into the appropriate area as directed As Needed for High Blood Sugar (Per the administration instructions). 11/29/21    Aracelis King MD   insulin lispro (ADMELOG) 100 UNIT/ML injection Inject 5 Units under the skin into the appropriate area as directed 3 (Three) Times a Day With Meals. 11/29/21   Aracelis King MD   lisinopril (PRINIVIL,ZESTRIL) 20 MG tablet Take 1 tablet by mouth Daily. 11/29/21   Aracelis King MD   melatonin 5 MG tablet tablet Take 1 tablet by mouth At Night As Needed (insomnia). 11/29/21   Aracelis King MD   metFORMIN ER (GLUCOPHAGE-XR) 500 MG 24 hr tablet Take 2,000 mg by mouth Daily.    Provider, MD Marcel   sertraline (ZOLOFT) 100 MG tablet Take 150 mg by mouth Daily.    Provider, MD Marcel   vitamin B-12 (CYANOCOBALAMIN) 1000 MCG tablet Take 1 tablet by mouth Daily. 11/29/21   Aracelis King MD             Objective   Physical Exam  66-year-old awake alert severe pain but nontoxic-appearing blood pressure stable 187/82 heart rate 75 sats at 98% on room air HEENT extraocular muscles are intact pupils equal round reactive there is no raccoon or pereyra sign.  No facial trauma no mandible pain back and spine no cervical thoracic or lumbar spine tenderness noted trachea midline no JVD chest wall no tenderness across the anterior chest or some lower left rib pain and flank pain there is no bruising there is no step-off deformity no crepitus or subcutaneous air good breath sounds bilaterally heart regular without murmur abdomen soft with some left upper quadrant tenderness no rebound no guarding good bowel sounds no peritoneal findings or other pulsatile masses.  Extremities and pelvis full range of motion of all extremities without pain or deformities no obvious fracture neurologic awake alert orientated x4 with a [place] Coma Scale 15  Procedures           ED Course  ED Course as of 02/11/22 1601   Fri Feb 11, 2022   1223 Dr. Franklin urology notified about findings on CT scan with large amount of blood and hematoma with active bleeding around kidney.  Patient will need serial hemoglobins and cardiac monitoring.  [SHANTEL]      ED Course User Index  [SHANTEL] Baljit Martinez MD            Results for orders placed or performed during the hospital encounter of 02/11/22   COVID-19,CEPHEID/DINO,COR/GA/PAD/JOSH IN-HOUSE(OR EMERGENT/ADD-ON),NP SWAB IN TRANSPORT MEDIA 3-4 HR TAT, RT-PCR - Swab, Nasopharynx    Specimen: Nasopharynx; Swab   Result Value Ref Range    COVID19 Not Detected Not Detected - Ref. Range   Comprehensive Metabolic Panel    Specimen: Blood   Result Value Ref Range    Glucose 288 (H) 65 - 99 mg/dL    BUN 28 (H) 8 - 23 mg/dL    Creatinine 0.81 0.57 - 1.00 mg/dL    Sodium 138 136 - 145 mmol/L    Potassium 4.3 3.5 - 5.2 mmol/L    Chloride 103 98 - 107 mmol/L    CO2 21.0 (L) 22.0 - 29.0 mmol/L    Calcium 8.9 8.6 - 10.5 mg/dL    Total Protein 6.0 6.0 - 8.5 g/dL    Albumin 3.50 3.50 - 5.20 g/dL    ALT (SGPT) 14 1 - 33 U/L    AST (SGOT) 13 1 - 32 U/L    Alkaline Phosphatase 77 39 - 117 U/L    Total Bilirubin 0.3 0.0 - 1.2 mg/dL    eGFR Non African Amer 71 >60 mL/min/1.73    Globulin 2.5 gm/dL    A/G Ratio 1.4 g/dL    BUN/Creatinine Ratio 34.6 (H) 7.0 - 25.0    Anion Gap 14.0 5.0 - 15.0 mmol/L   Lipase    Specimen: Blood   Result Value Ref Range    Lipase 51 13 - 60 U/L   Amylase    Specimen: Blood   Result Value Ref Range    Amylase 56 28 - 100 U/L   CBC Auto Differential    Specimen: Blood   Result Value Ref Range    WBC 15.60 (H) 3.40 - 10.80 10*3/mm3    RBC 3.88 3.77 - 5.28 10*6/mm3    Hemoglobin 11.5 (L) 12.0 - 15.9 g/dL    Hematocrit 34.4 34.0 - 46.6 %    MCV 88.7 79.0 - 97.0 fL    MCH 29.6 26.6 - 33.0 pg    MCHC 33.4 31.5 - 35.7 g/dL    RDW 13.6 12.3 - 15.4 %    RDW-SD 42.9 37.0 - 54.0 fl    MPV 7.2 6.0 - 12.0 fL    Platelets 351 140 - 450 10*3/mm3    Neutrophil % 86.7 (H) 42.7 - 76.0 %    Lymphocyte % 9.5 (L) 19.6 - 45.3 %    Monocyte % 3.2 (L) 5.0 - 12.0 %    Eosinophil % 0.3 0.3 - 6.2 %    Basophil % 0.3 0.0 - 1.5 %    Neutrophils, Absolute 13.50 (H) 1.70 - 7.00 10*3/mm3    Lymphocytes, Absolute 1.50 0.70 - 3.10  10*3/mm3    Monocytes, Absolute 0.50 0.10 - 0.90 10*3/mm3    Eosinophils, Absolute 0.00 0.00 - 0.40 10*3/mm3    Basophils, Absolute 0.00 0.00 - 0.20 10*3/mm3    nRBC 0.1 0.0 - 0.2 /100 WBC   Hemoglobin & Hematocrit, Blood    Specimen: Blood   Result Value Ref Range    Hemoglobin 10.7 (L) 12.0 - 15.9 g/dL    Hematocrit 31.5 (L) 34.0 - 46.6 %   Protime-INR    Specimen: Blood   Result Value Ref Range    Protime 10.9 9.6 - 11.7 Seconds    INR 0.98 0.93 - 1.10   aPTT    Specimen: Blood   Result Value Ref Range    PTT 24.2 24.0 - 31.0 seconds     CT Chest With Contrast Diagnostic    Result Date: 2/11/2022  1. Large left renal subcapsular hematoma with signs of active bleeding. I notified Dr. Martinez in the emergency room prior to the time of this dictation. 2. No acute findings in the chest or pelvis.  Electronically Signed By-Laura Garcia MD On:2/11/2022 12:34 PM This report was finalized on [id number] by  Laura Garcia MD.    CT Abdomen Pelvis With Contrast    Result Date: 2/11/2022  1. Large left renal subcapsular hematoma with signs of active bleeding. I notified Dr. Martinez in the emergency room prior to the time of this dictation. 2. No acute findings in the chest or pelvis.  Electronically Signed By-Laura Garcia MD On:2/11/2022 12:34 PM This report was finalized on [id number] by  Laura Garcia MD.    Medications   sodium chloride 0.9 % flush 10 mL (10 mL Intravenous Given 2/11/22 1341)   cefTRIAXone (ROCEPHIN) in SWFI 1 gram/10ml IV PUSH syringe (has no administration in time range)   ondansetron (ZOFRAN) injection 8 mg (8 mg Intravenous Given 2/11/22 1037)   Morphine sulfate (PF) injection 4 mg (4 mg Intravenous Given 2/11/22 1037)   iopamidol (ISOVUE-370) 76 % injection 100 mL (100 mL Intravenous Given 2/11/22 1213)   Morphine sulfate (PF) injection 4 mg (4 mg Intravenous Given 2/11/22 1301)   ondansetron (ZOFRAN) injection (4 mg Intravenous Given 2/11/22 1446)   iopamidol (ISOVUE-300) 61 % injection  100 mL (45 mL Intra-arterial Given 2/11/22 1530)   fentaNYL citrate (PF) (SUBLIMAZE) injection (100 mcg Intravenous Given 2/11/22 1505)   midazolam (VERSED) injection (0.5 mg Intravenous Given 2/11/22 1509)   lidocaine (XYLOCAINE) 1 % injection (3 mL Infiltration Given 2/11/22 1530)   hydrALAZINE (APRESOLINE) injection (10 mg Intravenous Given 2/11/22 1528)   hydrALAZINE (APRESOLINE) injection (10 mg Intravenous Given 2/11/22 1535)                                             MDM  Number of Diagnoses or Management Options  Fall, initial encounter: new and requires workup  Hematoma of left kidney, initial encounter: new and requires workup  Diagnosis management comments: Medical decision making an IV established placed on the monitor with sinus rhythm was given morphine 4 IV and Zofran 4 IV.  And had the above evaluation 19 - blood sugar 288 BUN of 28 creatinine was 0.81 lipase was normal amylase normal CBC white count 15.6 hemoglobin 11.5 she underwent a CT scan of the chest abdomen pelvis.  She had a large left renal subcapsular hematoma with signs of active bleeding.  This was called by the radiologist no other acute chest or abdominal findings per radiology reading.  I called the urologist Dr. Franklin and he was notified and at this point recommended just observation and serial hemoglobins and if became worse would need some IR intervention.  She remained hemodynamically stable there is no hypotension.  She subsequently had a repeat H&H done a couple hours later and her hemoglobin is 10.7 hematocrit 31.5 she required additional morphine 4 IV and 4 Zofran IV.  There is no evidence of head injury as a [place] Coma Scale 15 her abdomen soft without no findings she had tenderness left upper quadrant some flank pain.  Sats are good at 99%.  No other evidence of injury.  Pain severe sharp and stabbing worse with movement 10 out of 10.  Dr. King notified the patient be admitted for further work-up stable unremarkable ER  course.  All labs CT reviewed by me urine is pending       Amount and/or Complexity of Data Reviewed  Clinical lab tests: reviewed  Tests in the radiology section of CPT®: reviewed  Discuss the patient with other providers: yes    Risk of Complications, Morbidity, and/or Mortality  Presenting problems: high  Diagnostic procedures: high  Management options: high    Patient Progress  Patient progress: stable      Final diagnoses:   Fall, initial encounter   Hematoma of left kidney, initial encounter       ED Disposition  ED Disposition     ED Disposition Condition Comment    Decision to Admit  Level of Care: Telemetry [5]   Admitting Physician: SANTOS SNYDER [[id number]]   Attending Physician: SANTOS SNYDER [[id number]]   Bed Request Comments: pcu            No follow-up provider specified.       Medication List      No changes were made to your prescriptions during this visit.          Baljit Martinez MD  02/11/22 6240

## 2022-02-11 NOTE — H&P
Patient Care Team:  Baljit Samaniego MD as PCP - General (Family Medicine)    Chief complaint  Left rib  Pain post fall    Subjective     Patient is a 66 y.o. female presents  To ER with left side rib pain post fall . Onset of symptoms was today.  She reports that she tripped and fell.  Pt with hx of Diabetes with HTN / HLD/ Neuropathy and TIA. Er found CMP with glucose 228, cr 0.8 WBC 15.6, hgb 11.5. CT chest abd pelvis found a large left renal hematoma with signs of active bleeding. Two hours after the initial hgb of 11.5, HGB dropped to 10.7. Urology was consulted. Pt was taken to IR. Radiology found no evidence of active hemorrhage, pseudo aneurysm or abnormal vessel. No embolization was performed or required. She has been admitted for further evaluation and management.     Review of Systems   Constitutional: Positive for activity change. Negative for fever.   HENT: Negative for trouble swallowing.    Eyes: Negative for visual disturbance.   Respiratory: Negative for cough and shortness of breath.    Cardiovascular: Negative for chest pain, palpitations and leg swelling.   Gastrointestinal: Positive for abdominal pain and nausea. Negative for constipation, diarrhea and vomiting.   Genitourinary: Negative for difficulty urinating.   Musculoskeletal: Positive for arthralgias, back pain and gait problem.   Neurological: Positive for weakness.   Psychiatric/Behavioral: Negative for confusion.          History  Past Medical History:   Diagnosis Date   • Diabetes mellitus (HCC)    • Hyperlipidemia    • Hypertension    • TIA (transient ischemic attack)      Past Surgical History:   Procedure Laterality Date   • CERVICAL FUSION     •  SECTION       Family History   Problem Relation Age of Onset   • Heart disease Father      Social History     Tobacco Use   • Smoking status: Former Smoker   • Smokeless tobacco: Never Used   Substance Use Topics   • Alcohol use: Yes     Comment: once or twice a year   • Drug use:  Never     (Not in a hospital admission)    Allergies:  Patient has no known allergies.    Objective     Vital Signs  Temp:  [96.6 °F (35.9 °C)-97.7 °F (36.5 °C)] 96.6 °F (35.9 °C)  Heart Rate:  [75-96] 95  Resp:  [16-20] 20  BP: (133-200)/() 164/87     Physical Exam:      General Appearance:    Alert, cooperative, in no acute distress- pale - obese   Head:    Normocephalic, without obvious abnormality, atraumatic   Eyes:            Lids and lashes normal, conjunctivae and sclerae normal, no   icterus, no pallor, corneas clear, PERRLA   Ears:    Ears appear intact with no abnormalities noted   Throat:   No oral lesions, no thrush, oral mucosa moist   Neck:   No adenopathy, supple, trachea midline, no thyromegaly, no   carotid bruit, no JVD   Lungs:     Clear to auscultation,respirations regular, even and                  unlabored    Heart:    Regular rhythm and normal rate, normal S1 and S2, no            murmur, no gallop, no rub, no click   Chest Wall:    No abnormalities observed   Abdomen:     Obese, BS positive x4, soft- TTP diffusely    Extremities:    Able to move extremities but with pain with all movement. , no edema, no cyanosis, no             redness   Pulses:   Pulses palpable and equal bilaterally   Skin:   No bleeding, bruising or rash   Lymph nodes:   No palpable adenopathy   Neurologic:   Asleep - arouses with verbal stimuli- Ox 4. Asking for pain meds then falls back to sleep.        Results Review:     Imaging Results (Last 24 Hours)     Procedure Component Value Units Date/Time    IR Angiogram Renal 1st Order [[id number]] Collected: 02/11/22 1607     Updated: 02/11/22 1617    Narrative:      DATE OF EXAM:  2/11/2022 2:47 PM     PROCEDURE:  IR ANGIOGRAM RENAL 1ST ORDER-     INDICATIONS:  Fall/trauma with large left perinephric or subcapsular hematoma and  evidence of active hemorrhage by CT scan. Interventional radiology  consulted for possible embolization of bleeding left renal  artery  branch. W19.XXXA-Unspecified fall, initial encounter; S37.012A-Minor  contusion of left kidney, initial encounter     COMPARISON:  CT scan performed earlier same day.     FLUOROSCOPIC TIME:  3 minutes 9 seconds     PHYSICIAN MONITORED CONSCIOUS SEDATION TIME:  31 minutes     CONTRAST MEDIA:  45 cc Isovue-300     DESCRIPTION OF PROCEDURE:  The patient's medications were reviewed prior to the procedure. The  procedure, risks and alternatives were discussed and informed written  consent was obtained. Maximal sterile barrier technique was utilized  throughout the procedure. The patient was placed supine on the  fluoroscopy table. The right groin was prepped and draped using maximum  sterile barrier technique. Lidocaine was used for local anesthesia.  Access was obtained of the right common femoral artery under direct  ultrasound visualization using a micropuncture set. A [employer] wire was  advanced into the abdominal aorta and a 5 Maltese hemostatic sheath  placed. Using a 4 Maltese Ashley's hook catheter, the left renal artery  was selectively catheterized. Multiple left renal angiograms were then  performed in multiple obliquities. No abnormal vessels are demonstrated.  No evidence of pseudoaneurysm or active extravasation is identified.  There is normal parenchymal enhancement of the left kidney. Incidental  note is made of moderate narrowing of the origin of the left renal  artery. This may be due to kinking from the rotational effects of the  large perinephric hematoma, however.     A right femoral angiogram was performed to confirm anatomy appropriate  for use a closure device. The site was reprepped sterilely. Additional  lidocaine was given subcutaneously. Gloves were changed. Hemostasis was  achieved using an Angio-Seal closure device. Proximal and distal pulses  were intact following placement.     The procedure was performed under moderate conscious sedation with  continuous monitoring using Versed and  fentanyl. 31 minutes of physician  monitored sedation were provided.       Impression:      Multiple left renal angiograms demonstrate no evidence of continued  active hemorrhage, pseudoaneurysm or abnormal vessel. No embolization  was performed or required.     Electronically Signed By-Juice Willingham MD On:2/11/2022 4:15 PM  This report was finalized on [id number] by  Juice Willingham MD.    CT Chest With Contrast Diagnostic [[id number]] Collected: 02/11/22 1229     Updated: 02/11/22 1236    Narrative:      CT CHEST W CONTRAST DIAGNOSTIC-, CT ABDOMEN PELVIS W CONTRAST-     Date of Exam: 2/11/2022 11:50 AM     Indication: Trauma severe left rib pain left upper quadrant pain.     Comparison: [facility] portable chest 11/23/2021. No prior abdominal/pelvic  imaging for comparison at this institution.     Technique: Serial and axial CT images of the chest were obtained  following the uneventful intravenous administration of 100 cc Isovue-370  contrast. Reconstructions in the coronal and sagittal planes were also  performed.  Automated exposure control and iterative reconstruction  methods were used.     FINDINGS:     CHEST: No acute osseous abnormalities are seen within the chest. No  displaced rib fracture is identified. There surgical changes of anterior  cervical spine fusion.     Heart size is normal. Mild coronary artery calcifications are present.  No pericardial effusion. No pleural effusion. No pneumothorax. Benign  calcified granuloma is present in the right lower lobe. Benign calcified  lymph node is seen in the right hilum. Minimal dependent atelectasis in  the lungs. No acute airspace disease.           ABDOMEN AND PELVIS: 1.2 x 4.1 x 1.4 cm left renal subcapsular hematoma  is seen. Serpiginous high density material consistent with active IV  contrast extravasation, is seen within the hematoma, consistent with  active bleeding. No discrete, qing laceration defect is identified on  this examination. A small cyst  projects anteriorly from the left lower  renal pole measuring 17 x 12 mm.     Right kidney, liver, gallbladder, spleen, pancreas and adrenal glands  are normal. Normal appendix. The bowel does not appear thickened or  inflamed. Urinary bladder, uterus, and rectum are normal. There is mild  left-sided retroperitoneal stranding extending from the level of the  kidney into the pelvis.     No acute osseous abnormality is seen within the abdomen or pelvis.             Impression:      1. Large left renal subcapsular hematoma with signs of active bleeding.  I notified Dr. Martinez in the emergency room prior to the time of this  dictation.  2. No acute findings in the chest or pelvis.     Electronically Signed By-Laura Garcia MD On:2/11/2022 12:34 PM  This report was finalized on [id number] by  Laura Garcia MD.    CT Abdomen Pelvis With Contrast [[id number]] Collected: 02/11/22 1229     Updated: 02/11/22 1236    Narrative:      CT CHEST W CONTRAST DIAGNOSTIC-, CT ABDOMEN PELVIS W CONTRAST-     Date of Exam: 2/11/2022 11:50 AM     Indication: Trauma severe left rib pain left upper quadrant pain.     Comparison: [facility] portable chest 11/23/2021. No prior abdominal/pelvic  imaging for comparison at this institution.     Technique: Serial and axial CT images of the chest were obtained  following the uneventful intravenous administration of 100 cc Isovue-370  contrast. Reconstructions in the coronal and sagittal planes were also  performed.  Automated exposure control and iterative reconstruction  methods were used.     FINDINGS:     CHEST: No acute osseous abnormalities are seen within the chest. No  displaced rib fracture is identified. There surgical changes of anterior  cervical spine fusion.     Heart size is normal. Mild coronary artery calcifications are present.  No pericardial effusion. No pleural effusion. No pneumothorax. Benign  calcified granuloma is present in the right lower lobe. Benign calcified  lymph node  is seen in the right hilum. Minimal dependent atelectasis in  the lungs. No acute airspace disease.           ABDOMEN AND PELVIS: 1.2 x 4.1 x 1.4 cm left renal subcapsular hematoma  is seen. Serpiginous high density material consistent with active IV  contrast extravasation, is seen within the hematoma, consistent with  active bleeding. No discrete, qing laceration defect is identified on  this examination. A small cyst projects anteriorly from the left lower  renal pole measuring 17 x 12 mm.     Right kidney, liver, gallbladder, spleen, pancreas and adrenal glands  are normal. Normal appendix. The bowel does not appear thickened or  inflamed. Urinary bladder, uterus, and rectum are normal. There is mild  left-sided retroperitoneal stranding extending from the level of the  kidney into the pelvis.     No acute osseous abnormality is seen within the abdomen or pelvis.             Impression:      1. Large left renal subcapsular hematoma with signs of active bleeding.  I notified Dr. Martinez in the emergency room prior to the time of this  dictation.  2. No acute findings in the chest or pelvis.     Electronically Signed By-Laura Garcia MD On:2/11/2022 12:34 PM  This report was finalized on [id number] by  Laura Garcia MD.           Lab Results (last 24 hours)     Procedure Component Value Units Date/Time    COVID PRE-OP / PRE-PROCEDURE SCREENING ORDER (NO ISOLATION) - Swab, Nasopharynx [[id number]]  (Normal) Collected: 02/11/22 1341    Specimen: Swab from Nasopharynx Updated: 02/11/22 1406    Narrative:      The following orders were created for panel order COVID PRE-OP / PRE-PROCEDURE SCREENING ORDER (NO ISOLATION) - Swab, Nasopharynx.  Procedure                               Abnormality         Status                     ---------                               -----------         ------                     COVID-19,CEPHEID/DINO,CO...[[id number]]  Normal              Final result                 Please view  results for these tests on the individual orders.    COVID-19,CEPHEID/DINO,COR/GA/PAD/JOSH IN-HOUSE(OR EMERGENT/ADD-ON),NP SWAB IN TRANSPORT MEDIA 3-4 HR TAT, RT-PCR - Swab, Nasopharynx [[id number]]  (Normal) Collected: 02/11/22 1341    Specimen: Swab from Nasopharynx Updated: 02/11/22 1406     COVID19 Not Detected    Narrative:      Fact sheet for providers: https://www.fda.gov/media/588380/download     Fact sheet for patients: https://www.fda.gov/media/449705/download  Fact sheet for providers: https://www.fda.gov/media/504365/download    Fact sheet for patients: https://www.fda.gov/media/070766/download    Test performed by PCR.    aPTT [[id number]]  (Normal) Collected: 02/11/22 1341    Specimen: Blood Updated: 02/11/22 1401     PTT 24.2 seconds     Protime-INR [[id number]]  (Normal) Collected: 02/11/22 1341    Specimen: Blood Updated: 02/11/22 1401     Protime 10.9 Seconds      INR 0.98    Hemoglobin & Hematocrit, Blood [[id number]]  (Abnormal) Collected: 02/11/22 1341    Specimen: Blood Updated: 02/11/22 1348     Hemoglobin 10.7 g/dL      Hematocrit 31.5 %     Comprehensive Metabolic Panel [[id number]]  (Abnormal) Collected: 02/11/22 1040    Specimen: Blood Updated: 02/11/22 1108     Glucose 288 mg/dL      BUN 28 mg/dL      Creatinine 0.81 mg/dL      Sodium 138 mmol/L      Potassium 4.3 mmol/L      Chloride 103 mmol/L      CO2 21.0 mmol/L      Calcium 8.9 mg/dL      Total Protein 6.0 g/dL      Albumin 3.50 g/dL      ALT (SGPT) 14 U/L      AST (SGOT) 13 U/L      Alkaline Phosphatase 77 U/L      Total Bilirubin 0.3 mg/dL      eGFR Non African Amer 71 mL/min/1.73      Globulin 2.5 gm/dL      A/G Ratio 1.4 g/dL      BUN/Creatinine Ratio 34.6     Anion Gap 14.0 mmol/L     Narrative:      GFR Normal >60  Chronic Kidney Disease <60  Kidney Failure <15      Lipase [[id number]]  (Normal) Collected: 02/11/22 1040    Specimen: Blood Updated: 02/11/22 1108     Lipase 51 U/L     Amylase [[id number]]  (Normal) Collected: 02/11/22  1040    Specimen: Blood Updated: 02/11/22 1108     Amylase 56 U/L     CBC & Differential [[id number]]  (Abnormal) Collected: 02/11/22 1040    Specimen: Blood Updated: 02/11/22 1048    Narrative:      The following orders were created for panel order CBC & Differential.  Procedure                               Abnormality         Status                     ---------                               -----------         ------                     CBC Auto Differential[[id number]]        Abnormal            Final result                 Please view results for these tests on the individual orders.    CBC Auto Differential [[id number]]  (Abnormal) Collected: 02/11/22 1040    Specimen: Blood Updated: 02/11/22 1048     WBC 15.60 10*3/mm3      RBC 3.88 10*6/mm3      Hemoglobin 11.5 g/dL      Hematocrit 34.4 %      MCV 88.7 fL      MCH 29.6 pg      MCHC 33.4 g/dL      RDW 13.6 %      RDW-SD 42.9 fl      MPV 7.2 fL      Platelets 351 10*3/mm3      Neutrophil % 86.7 %      Lymphocyte % 9.5 %      Monocyte % 3.2 %      Eosinophil % 0.3 %      Basophil % 0.3 %      Neutrophils, Absolute 13.50 10*3/mm3      Lymphocytes, Absolute 1.50 10*3/mm3      Monocytes, Absolute 0.50 10*3/mm3      Eosinophils, Absolute 0.00 10*3/mm3      Basophils, Absolute 0.00 10*3/mm3      nRBC 0.1 /100 WBC            I reviewed the patient's new clinical results.    Assessment/Plan       * No active hospital problems. *    Fall - Pt reports a trip and fall. PT / OT when stable and pain improved.     Hematoma of the left kidney- post L renal angiograms. No evidence of active hemorrhage. Control pain .       T2DM with HTN/. HLD/ Neuropathy - continue home meds. SSI for glucose control.     Mood disorder- continue zoloft    I discussed the patients findings and my recommendations with patient.     Yesenia Bustamante, APRN  02/11/22  17:26 EST

## 2022-02-11 NOTE — NURSING NOTE
MD numbs right groin and places needle into RCFA.  Wire introduced and navigated to renal artery under fluoro.

## 2022-02-11 NOTE — CONSULTS
[facility] CONSULT      Patient Identification:  NAME:  Medina Ann  Age:  66 y.o.   Sex:  female   :  1955   MRN:  [id number]       Chief complaint/Reason for consult: Status post fall with subcapsular hematoma    History of present illness:  66 y.o. female presented the ER after tripped and fell on left side has been left rib pain severe.  CT scan revealed a 4 cm left subcapsular hematoma with possible active bleeding.  She is in severe pain on left flank and blood pressure significantly elevated.  Creatinine is normal at 0.8.  Hemoglobin is 11.5 on initial check.      Past medical history:  Past Medical History:   Diagnosis Date   • Diabetes mellitus (HCC)    • Hyperlipidemia    • Hypertension    • TIA (transient ischemic attack)        Past surgical history:  Past Surgical History:   Procedure Laterality Date   • CERVICAL FUSION     •  SECTION         Allergies:  Patient has no known allergies.    Home medications:  (Not in a hospital admission)       Hospital medications:  Morphine, 4 mg, Intravenous, Once         [COMPLETED] Insert peripheral IV **AND** sodium chloride    Family history:  Family History   Problem Relation Age of Onset   • Heart disease Father        Social history:  Social History     Tobacco Use   • Smoking status: Former Smoker   • Smokeless tobacco: Never Used   Substance Use Topics   • Alcohol use: Yes     Comment: once or twice a year   • Drug use: Never       REVIEW OF SYSTEMS:  Constitutional - Negative for fevers, chills, lethargy  Eyes/Ears/Nose/Mouth/Throat - Negative for changes in vision or hearing  Cardiovascular - Negative for increased edema or cyanosis  Respiratory - Negative for dyspnea or wheezing  Gastrointestinal - Negative for nausea or vomiting  Genitourinary - Negative for dysuria or hematuria  Hematologic/Lymphatic - Negative for bruising or cyanosis  Skin - Negative for erythema or rash  Psych - Negative     Objective:  TMax 24 hours:   Temp  (24hrs), Av.7 °F (36.5 °C), Min:97.7 °F (36.5 °C), Max:97.7 °F (36.5 °C)      Vitals Ranges:   Temp:  [97.7 °F (36.5 °C)] 97.7 °F (36.5 °C)  Heart Rate:  [75] 75  Resp:  [20] 20  BP: (187)/(82) 187/82    Intake/Output Last 3 shifts:  No intake/output data recorded.     Physical Exam:    General Appearance:    Alert, cooperative, NAD   HEENT:    No trauma, pupils reactive, hearing intact   Lungs:     Respirations unlabored, no audible wheezing    Heart:    No cyanosis, No significant edema   Abdomen:     Soft, severe left-sided tenderness along flank   :    No suprapubic distention   Extremities:   No significant edema, no deformity   Lymphatic:   No neck or groin LAD       Neuro/Psych:   Orientation intact, mood/affect pleasant, no focal findings       Results review:   I reviewed the patient's new clinical results.    Data review:  Lab Results (last 24 hours)     Procedure Component Value Units Date/Time    Comprehensive Metabolic Panel [[id number]]  (Abnormal) Collected: 22    Specimen: Blood Updated: 22 110     Glucose 288 mg/dL      BUN 28 mg/dL      Creatinine 0.81 mg/dL      Sodium 138 mmol/L      Potassium 4.3 mmol/L      Chloride 103 mmol/L      CO2 21.0 mmol/L      Calcium 8.9 mg/dL      Total Protein 6.0 g/dL      Albumin 3.50 g/dL      ALT (SGPT) 14 U/L      AST (SGOT) 13 U/L      Alkaline Phosphatase 77 U/L      Total Bilirubin 0.3 mg/dL      eGFR Non African Amer 71 mL/min/1.73      Globulin 2.5 gm/dL      A/G Ratio 1.4 g/dL      BUN/Creatinine Ratio 34.6     Anion Gap 14.0 mmol/L     Narrative:      GFR Normal >60  Chronic Kidney Disease <60  Kidney Failure <15      Lipase [[id number]]  (Normal) Collected: 22    Specimen: Blood Updated: 22 110     Lipase 51 U/L     Amylase [[id number]]  (Normal) Collected: 22    Specimen: Blood Updated: 22 110     Amylase 56 U/L     CBC & Differential [[id number]]  (Abnormal) Collected: 22 1040    Specimen:  Blood Updated: 02/11/22 1048    Narrative:      The following orders were created for panel order CBC & Differential.  Procedure                               Abnormality         Status                     ---------                               -----------         ------                     CBC Auto Differential[[id number]]        Abnormal            Final result                 Please view results for these tests on the individual orders.    CBC Auto Differential [[id number]]  (Abnormal) Collected: 02/11/22 1040    Specimen: Blood Updated: 02/11/22 1048     WBC 15.60 10*3/mm3      RBC 3.88 10*6/mm3      Hemoglobin 11.5 g/dL      Hematocrit 34.4 %      MCV 88.7 fL      MCH 29.6 pg      MCHC 33.4 g/dL      RDW 13.6 %      RDW-SD 42.9 fl      MPV 7.2 fL      Platelets 351 10*3/mm3      Neutrophil % 86.7 %      Lymphocyte % 9.5 %      Monocyte % 3.2 %      Eosinophil % 0.3 %      Basophil % 0.3 %      Neutrophils, Absolute 13.50 10*3/mm3      Lymphocytes, Absolute 1.50 10*3/mm3      Monocytes, Absolute 0.50 10*3/mm3      Eosinophils, Absolute 0.00 10*3/mm3      Basophils, Absolute 0.00 10*3/mm3      nRBC 0.1 /100 WBC            Imaging:  Imaging Results (Last 24 Hours)     Procedure Component Value Units Date/Time    CT Chest With Contrast Diagnostic [[id number]] Collected: 02/11/22 1229     Updated: 02/11/22 1236    Narrative:      CT CHEST W CONTRAST DIAGNOSTIC-, CT ABDOMEN PELVIS W CONTRAST-     Date of Exam: 2/11/2022 11:50 AM     Indication: Trauma severe left rib pain left upper quadrant pain.     Comparison: [facility] portable chest 11/23/2021. No prior abdominal/pelvic  imaging for comparison at this institution.     Technique: Serial and axial CT images of the chest were obtained  following the uneventful intravenous administration of 100 cc Isovue-370  contrast. Reconstructions in the coronal and sagittal planes were also  performed.  Automated exposure control and iterative reconstruction  methods were used.      FINDINGS:     CHEST: No acute osseous abnormalities are seen within the chest. No  displaced rib fracture is identified. There surgical changes of anterior  cervical spine fusion.     Heart size is normal. Mild coronary artery calcifications are present.  No pericardial effusion. No pleural effusion. No pneumothorax. Benign  calcified granuloma is present in the right lower lobe. Benign calcified  lymph node is seen in the right hilum. Minimal dependent atelectasis in  the lungs. No acute airspace disease.           ABDOMEN AND PELVIS: 1.2 x 4.1 x 1.4 cm left renal subcapsular hematoma  is seen. Serpiginous high density material consistent with active IV  contrast extravasation, is seen within the hematoma, consistent with  active bleeding. No discrete, qing laceration defect is identified on  this examination. A small cyst projects anteriorly from the left lower  renal pole measuring 17 x 12 mm.     Right kidney, liver, gallbladder, spleen, pancreas and adrenal glands  are normal. Normal appendix. The bowel does not appear thickened or  inflamed. Urinary bladder, uterus, and rectum are normal. There is mild  left-sided retroperitoneal stranding extending from the level of the  kidney into the pelvis.     No acute osseous abnormality is seen within the abdomen or pelvis.             Impression:      1. Large left renal subcapsular hematoma with signs of active bleeding.  I notified Dr. Martinez in the emergency room prior to the time of this  dictation.  2. No acute findings in the chest or pelvis.     Electronically Signed By-Laura Garcia MD On:2/11/2022 12:34 PM  This report was finalized on [id number] by  Laura Garcia MD.    CT Abdomen Pelvis With Contrast [[id number]] Collected: 02/11/22 1229     Updated: 02/11/22 1236    Narrative:      CT CHEST W CONTRAST DIAGNOSTIC-, CT ABDOMEN PELVIS W CONTRAST-     Date of Exam: 2/11/2022 11:50 AM     Indication: Trauma severe left rib pain left upper quadrant  pain.     Comparison: [facility] portable chest 11/23/2021. No prior abdominal/pelvic  imaging for comparison at this institution.     Technique: Serial and axial CT images of the chest were obtained  following the uneventful intravenous administration of 100 cc Isovue-370  contrast. Reconstructions in the coronal and sagittal planes were also  performed.  Automated exposure control and iterative reconstruction  methods were used.     FINDINGS:     CHEST: No acute osseous abnormalities are seen within the chest. No  displaced rib fracture is identified. There surgical changes of anterior  cervical spine fusion.     Heart size is normal. Mild coronary artery calcifications are present.  No pericardial effusion. No pleural effusion. No pneumothorax. Benign  calcified granuloma is present in the right lower lobe. Benign calcified  lymph node is seen in the right hilum. Minimal dependent atelectasis in  the lungs. No acute airspace disease.           ABDOMEN AND PELVIS: 1.2 x 4.1 x 1.4 cm left renal subcapsular hematoma  is seen. Serpiginous high density material consistent with active IV  contrast extravasation, is seen within the hematoma, consistent with  active bleeding. No discrete, qing laceration defect is identified on  this examination. A small cyst projects anteriorly from the left lower  renal pole measuring 17 x 12 mm.     Right kidney, liver, gallbladder, spleen, pancreas and adrenal glands  are normal. Normal appendix. The bowel does not appear thickened or  inflamed. Urinary bladder, uterus, and rectum are normal. There is mild  left-sided retroperitoneal stranding extending from the level of the  kidney into the pelvis.     No acute osseous abnormality is seen within the abdomen or pelvis.             Impression:      1. Large left renal subcapsular hematoma with signs of active bleeding.  I notified Dr. Martinez in the emergency room prior to the time of this  dictation.  2. No acute findings in the chest or  pelvis.     Electronically Signed By-Laura Garcia MD On:2/11/2022 12:34 PM  This report was finalized on [id number] by  Laura Garcia MD.             Assessment:       * No active hospital problems. *    Left subcapsular hematoma status post fall  Hypertension possible page kidney    Plan:     CT images reviewed, left subcapsular hematoma with wisp of contrast indicating active extravasation, no actual laceration seen on scan or current concern for renal pelvis injury  Call made to and discussed with IR regarding findings and possible need for embolization, Dr Willingham rec angiogram and poss embolization given wisp of extrav and active bleeding. Her hemoglobin is currently 11.5 this will need to be monitored very closely, Q2hrs checks if stable will space out.   Keep NPO and bedrest  Her blood pressure is currently significantly elevated with pain out of control, will control pain and monitor blood pressure levels.  This may be a sign of page kidney from compression of the renal parenchyma.  She will need close medical observation, will need TCU or ICU      Philippe Franklin MD  [facility]  [address], [address]  [place] [address]  [phone]  02/11/22  12:49 EST

## 2022-02-11 NOTE — NURSING NOTE
Pt. Brought to CDU 3. No report given to this nurse. Pt. Appears to be in no distress at this time.

## 2022-02-11 NOTE — PLAN OF CARE
Problem: Adult Inpatient Plan of Care  Goal: Plan of Care Review  Outcome: Ongoing, Not Progressing  Flowsheets (Taken 2/11/2022 1832)  Progress: no change  Goal: Patient-Specific Goal (Individualized)  Outcome: Ongoing, Not Progressing  Goal: Absence of Hospital-Acquired Illness or Injury  Outcome: Ongoing, Not Progressing  Goal: Optimal Comfort and Wellbeing  Outcome: Ongoing, Not Progressing  Goal: Readiness for Transition of Care  Outcome: Ongoing, Not Progressing  Intervention: Mutually Develop Transition Plan  Recent Flowsheet Documentation  Taken 2/11/2022 1706 by Esmer Arora, RN  Transportation Anticipated: family or friend will provide  Patient/Family Anticipated Services at Transition: none  Patient/Family Anticipates Transition to: home with family  Taken 2/11/2022 1705 by Esmer Arora, RN  Equipment Currently Used at Home:   cane, quad   walker, standard   shower chair   glucometer  Taken 2/11/2022 1704 by Esmer Arora, RN  Equipment Currently Used at Home:   cane, straight   walker, standard     Problem: Fall Injury Risk  Goal: Absence of Fall and Fall-Related Injury  Outcome: Ongoing, Not Progressing     Problem: Pain Acute  Goal: Optimal Pain Control  Outcome: Ongoing, Not Progressing   Goal Outcome Evaluation:           Progress: no change

## 2022-02-11 NOTE — ED NOTES
Pt here via EMS; c/o left rib pain s/p falling and striking left ribs on table this AM; denies hitting head/LOC.     Eli Aguirre RN  02/11/22 2536

## 2022-02-12 LAB
ALBUMIN SERPL-MCNC: 3 G/DL (ref 3.5–5.2)
ALBUMIN/GLOB SERPL: 1.4 G/DL
ALP SERPL-CCNC: 61 U/L (ref 39–117)
ALT SERPL W P-5'-P-CCNC: 10 U/L (ref 1–33)
ANION GAP SERPL CALCULATED.3IONS-SCNC: 10 MMOL/L (ref 5–15)
AST SERPL-CCNC: 10 U/L (ref 1–32)
BASOPHILS # BLD AUTO: 0.1 10*3/MM3 (ref 0–0.2)
BASOPHILS NFR BLD AUTO: 0.6 % (ref 0–1.5)
BILIRUB SERPL-MCNC: 0.2 MG/DL (ref 0–1.2)
BUN SERPL-MCNC: 38 MG/DL (ref 8–23)
BUN/CREAT SERPL: 23.8 (ref 7–25)
CALCIUM SPEC-SCNC: 8.5 MG/DL (ref 8.6–10.5)
CHLORIDE SERPL-SCNC: 102 MMOL/L (ref 98–107)
CO2 SERPL-SCNC: 24 MMOL/L (ref 22–29)
CREAT SERPL-MCNC: 1.6 MG/DL (ref 0.57–1)
DEPRECATED RDW RBC AUTO: 44.6 FL (ref 37–54)
EOSINOPHIL # BLD AUTO: 0 10*3/MM3 (ref 0–0.4)
EOSINOPHIL NFR BLD AUTO: 0 % (ref 0.3–6.2)
ERYTHROCYTE [DISTWIDTH] IN BLOOD BY AUTOMATED COUNT: 14 % (ref 12.3–15.4)
GFR SERPL CREATININE-BSD FRML MDRD: 32 ML/MIN/1.73
GLOBULIN UR ELPH-MCNC: 2.2 GM/DL
GLUCOSE BLDC GLUCOMTR-MCNC: 159 MG/DL (ref 70–105)
GLUCOSE BLDC GLUCOMTR-MCNC: 180 MG/DL (ref 70–105)
GLUCOSE BLDC GLUCOMTR-MCNC: 191 MG/DL (ref 70–105)
GLUCOSE SERPL-MCNC: 179 MG/DL (ref 65–99)
HCT VFR BLD AUTO: 23.3 % (ref 34–46.6)
HCT VFR BLD AUTO: 24.8 % (ref 34–46.6)
HCT VFR BLD AUTO: 24.8 % (ref 34–46.6)
HCT VFR BLD AUTO: 25.3 % (ref 34–46.6)
HCT VFR BLD AUTO: 26 % (ref 34–46.6)
HCT VFR BLD AUTO: 29 % (ref 34–46.6)
HGB BLD-MCNC: 7.7 G/DL (ref 12–15.9)
HGB BLD-MCNC: 8.4 G/DL (ref 12–15.9)
HGB BLD-MCNC: 8.5 G/DL (ref 12–15.9)
HGB BLD-MCNC: 8.6 G/DL (ref 12–15.9)
HGB BLD-MCNC: 8.7 G/DL (ref 12–15.9)
HGB BLD-MCNC: 9.8 G/DL (ref 12–15.9)
LYMPHOCYTES # BLD AUTO: 1.8 10*3/MM3 (ref 0.7–3.1)
LYMPHOCYTES NFR BLD AUTO: 14.4 % (ref 19.6–45.3)
MCH RBC QN AUTO: 30.3 PG (ref 26.6–33)
MCHC RBC AUTO-ENTMCNC: 33.5 G/DL (ref 31.5–35.7)
MCV RBC AUTO: 90.3 FL (ref 79–97)
MONOCYTES # BLD AUTO: 0.9 10*3/MM3 (ref 0.1–0.9)
MONOCYTES NFR BLD AUTO: 7.7 % (ref 5–12)
NEUTROPHILS NFR BLD AUTO: 77.3 % (ref 42.7–76)
NEUTROPHILS NFR BLD AUTO: 9.4 10*3/MM3 (ref 1.7–7)
NRBC BLD AUTO-RTO: 0 /100 WBC (ref 0–0.2)
PLATELET # BLD AUTO: 286 10*3/MM3 (ref 140–450)
PMV BLD AUTO: 7.4 FL (ref 6–12)
POTASSIUM SERPL-SCNC: 5.4 MMOL/L (ref 3.5–5.2)
PROT SERPL-MCNC: 5.2 G/DL (ref 6–8.5)
RBC # BLD AUTO: 2.88 10*6/MM3 (ref 3.77–5.28)
SODIUM SERPL-SCNC: 136 MMOL/L (ref 136–145)
WBC NRBC COR # BLD: 12.2 10*3/MM3 (ref 3.4–10.8)

## 2022-02-12 PROCEDURE — 85025 COMPLETE CBC W/AUTO DIFF WBC: CPT | Performed by: NURSE PRACTITIONER

## 2022-02-12 PROCEDURE — 85018 HEMOGLOBIN: CPT | Performed by: INTERNAL MEDICINE

## 2022-02-12 PROCEDURE — 85014 HEMATOCRIT: CPT | Performed by: INTERNAL MEDICINE

## 2022-02-12 PROCEDURE — 63710000001 INSULIN LISPRO (HUMAN) PER 5 UNITS: Performed by: NURSE PRACTITIONER

## 2022-02-12 PROCEDURE — 82962 GLUCOSE BLOOD TEST: CPT

## 2022-02-12 PROCEDURE — 0 MORPHINE SULFATE 4 MG/ML SOLUTION: Performed by: INTERNAL MEDICINE

## 2022-02-12 PROCEDURE — 25010000002 CEFTRIAXONE PER 250 MG: Performed by: INTERNAL MEDICINE

## 2022-02-12 PROCEDURE — 80053 COMPREHEN METABOLIC PANEL: CPT | Performed by: NURSE PRACTITIONER

## 2022-02-12 PROCEDURE — 36415 COLL VENOUS BLD VENIPUNCTURE: CPT | Performed by: INTERNAL MEDICINE

## 2022-02-12 RX ORDER — SODIUM CHLORIDE 9 MG/ML
75 INJECTION, SOLUTION INTRAVENOUS CONTINUOUS
Status: DISCONTINUED | OUTPATIENT
Start: 2022-02-12 | End: 2022-02-14

## 2022-02-12 RX ORDER — LIDOCAINE 50 MG/G
2 PATCH TOPICAL
Status: DISCONTINUED | OUTPATIENT
Start: 2022-02-12 | End: 2022-02-14 | Stop reason: HOSPADM

## 2022-02-12 RX ADMIN — LIDOCAINE 2 PATCH: 50 PATCH TOPICAL at 12:15

## 2022-02-12 RX ADMIN — SODIUM CHLORIDE, PRESERVATIVE FREE 10 ML: 5 INJECTION INTRAVENOUS at 08:23

## 2022-02-12 RX ADMIN — SODIUM CHLORIDE, PRESERVATIVE FREE 10 ML: 5 INJECTION INTRAVENOUS at 21:56

## 2022-02-12 RX ADMIN — AMLODIPINE BESYLATE 10 MG: 5 TABLET ORAL at 08:22

## 2022-02-12 RX ADMIN — GABAPENTIN 300 MG: 300 CAPSULE ORAL at 08:22

## 2022-02-12 RX ADMIN — SERTRALINE 150 MG: 100 TABLET, FILM COATED ORAL at 08:21

## 2022-02-12 RX ADMIN — ATORVASTATIN CALCIUM 40 MG: 40 TABLET, FILM COATED ORAL at 21:49

## 2022-02-12 RX ADMIN — GABAPENTIN 600 MG: 300 CAPSULE ORAL at 21:48

## 2022-02-12 RX ADMIN — SODIUM CHLORIDE 100 ML/HR: 9 INJECTION, SOLUTION INTRAVENOUS at 15:27

## 2022-02-12 RX ADMIN — MORPHINE SULFATE 4 MG: 4 INJECTION INTRAVENOUS at 19:29

## 2022-02-12 RX ADMIN — PANTOPRAZOLE SODIUM 40 MG: 40 TABLET, DELAYED RELEASE ORAL at 06:14

## 2022-02-12 RX ADMIN — INSULIN LISPRO 2 UNITS: 100 INJECTION, SOLUTION INTRAVENOUS; SUBCUTANEOUS at 08:21

## 2022-02-12 RX ADMIN — INSULIN LISPRO 2 UNITS: 100 INJECTION, SOLUTION INTRAVENOUS; SUBCUTANEOUS at 12:14

## 2022-02-12 RX ADMIN — LISINOPRIL 20 MG: 20 TABLET ORAL at 08:23

## 2022-02-12 RX ADMIN — HYDROCODONE BITARTRATE AND ACETAMINOPHEN 1 TABLET: 7.5; 325 TABLET ORAL at 08:21

## 2022-02-12 RX ADMIN — INSULIN LISPRO 2 UNITS: 100 INJECTION, SOLUTION INTRAVENOUS; SUBCUTANEOUS at 17:26

## 2022-02-12 RX ADMIN — CEFTRIAXONE 1 G: 10 INJECTION, POWDER, FOR SOLUTION INTRAVENOUS at 17:26

## 2022-02-12 RX ADMIN — GABAPENTIN 300 MG: 300 CAPSULE ORAL at 12:14

## 2022-02-12 RX ADMIN — HYDROCODONE BITARTRATE AND ACETAMINOPHEN 1 TABLET: 7.5; 325 TABLET ORAL at 15:27

## 2022-02-12 NOTE — PLAN OF CARE
Goal Outcome Evaluation:  Patient is alert and oriented, able to make needs known to staff. Patient has no complaints of pain at this time. Will continue to monitor.

## 2022-02-12 NOTE — PROGRESS NOTES
LOS: 0 days   Patient Care Team:  Baljit Samaniego MD as PCP - General (Family Medicine)    Subjective     Interval History:Continues to have severe left flank and lower rib pain; no bleeding vessel found on angiogram last night    Patient Complaints: See above    History taken from: patient    Review of Systems   Constitutional: Positive for activity change, appetite change and fatigue. Negative for chills, diaphoresis and fever.   HENT: Negative for facial swelling.    Eyes: Negative for visual disturbance.   Respiratory: Negative for cough, shortness of breath, wheezing and stridor.    Cardiovascular: Negative for chest pain, palpitations and leg swelling.   Gastrointestinal: Positive for abdominal pain. Negative for constipation, diarrhea, nausea and vomiting.   Endocrine: Negative for polyuria.   Genitourinary: Positive for hematuria. Negative for dysuria.   Musculoskeletal: Positive for back pain, gait problem and myalgias. Negative for arthralgias.   Skin: Positive for wound. Negative for rash.   Neurological: Negative for dizziness, syncope, speech difficulty, weakness, numbness and headaches.   Psychiatric/Behavioral: Negative for confusion.           Objective     Vital Signs  Temp:  [96.6 °F (35.9 °C)-99.1 °F (37.3 °C)] 99.1 °F (37.3 °C)  Heart Rate:  [] 89  Resp:  [15-20] 15  BP: ()/() 93/57    Physical Exam:     General Appearance:    Alert, cooperative, in no acute distress, BMI 44   Head:    Normocephalic, without obvious abnormality, atraumatic   Eyes:            Lids and lashes normal, conjunctivae and sclerae normal, no   icterus, no pallor, corneas clear, PERRLA   Ears:    Ears appear intact with no abnormalities noted   Throat:   No oral lesions, no thrush, oral mucosa moist   Neck:   No adenopathy, supple, trachea midline, no thyromegaly, no   carotid bruit, no JVD   Lungs:     Clear to auscultation,respirations regular, even and                  unlabored    Heart:     Regular rhythm and normal rate, normal S1 and S2, no            murmur, no gallop, no rub, no click   Chest Wall:    Tender right posterior ribs   Abdomen:     Normal bowel sounds, no masses, no organomegaly, soft        Non-tender non-distended, no guarding,   Extremities:  Right femoral - puncture site WNL.; no edema   Pulses:   Pulses palpable and equal bilaterally   Skin:   No bleeding, bruising or rash   Lymph nodes:   No palpable adenopathy   Neurologic:   Cranial nerves 2 - 12 grossly intact, sensation intact, DTR       present and equal bilaterally        Results Review:    Lab Results (last 24 hours)     Procedure Component Value Units Date/Time    POC Glucose Once [[id number]]  (Abnormal) Collected: 02/12/22 1136    Specimen: Blood Updated: 02/12/22 1136     Glucose 159 mg/dL      Comment: Serial Number: [id number]:  [id number]       Hemoglobin & Hematocrit, Blood [[id number]]  (Abnormal) Collected: 02/12/22 1019    Specimen: Blood Updated: 02/12/22 1030     Hemoglobin 8.5 g/dL      Hematocrit 24.8 %     Hemoglobin & Hematocrit, Blood [[id number]]  (Abnormal) Collected: 02/12/22 0811    Specimen: Blood Updated: 02/12/22 0821     Hemoglobin 8.6 g/dL      Hematocrit 25.3 %     POC Glucose Once [[id number]]  (Abnormal) Collected: 02/12/22 0704    Specimen: Blood Updated: 02/12/22 0705     Glucose 180 mg/dL      Comment: Serial Number: [id number]:  [id number]       Comprehensive Metabolic Panel [[id number]]  (Abnormal) Collected: 02/12/22 0536    Specimen: Blood Updated: 02/12/22 0613     Glucose 179 mg/dL      BUN 38 mg/dL      Creatinine 1.60 mg/dL      Sodium 136 mmol/L      Potassium 5.4 mmol/L      Chloride 102 mmol/L      CO2 24.0 mmol/L      Calcium 8.5 mg/dL      Total Protein 5.2 g/dL      Albumin 3.00 g/dL      ALT (SGPT) 10 U/L      AST (SGOT) 10 U/L      Alkaline Phosphatase 61 U/L      Total Bilirubin 0.2 mg/dL      eGFR Non African Amer 32 mL/min/1.73      Globulin 2.2 gm/dL      A/G Ratio  1.4 g/dL      BUN/Creatinine Ratio 23.8     Anion Gap 10.0 mmol/L     Narrative:      GFR Normal >60  Chronic Kidney Disease <60  Kidney Failure <15      CBC Auto Differential [[id number]]  (Abnormal) Collected: 02/12/22 0536    Specimen: Blood Updated: 02/12/22 0547     WBC 12.20 10*3/mm3      RBC 2.88 10*6/mm3      Hemoglobin 8.7 g/dL      Hematocrit 26.0 %      MCV 90.3 fL      MCH 30.3 pg      MCHC 33.5 g/dL      RDW 14.0 %      RDW-SD 44.6 fl      MPV 7.4 fL      Platelets 286 10*3/mm3      Neutrophil % 77.3 %      Lymphocyte % 14.4 %      Monocyte % 7.7 %      Eosinophil % 0.0 %      Basophil % 0.6 %      Neutrophils, Absolute 9.40 10*3/mm3      Lymphocytes, Absolute 1.80 10*3/mm3      Monocytes, Absolute 0.90 10*3/mm3      Eosinophils, Absolute 0.00 10*3/mm3      Basophils, Absolute 0.10 10*3/mm3      nRBC 0.0 /100 WBC     Hemoglobin & Hematocrit, Blood [[id number]]  (Abnormal) Collected: 02/12/22 0024    Specimen: Blood Updated: 02/12/22 0104     Hemoglobin 9.8 g/dL      Hematocrit 29.0 %     Urinalysis, Microscopic Only - Urine, Clean Catch [[id number]]  (Abnormal) Collected: 02/11/22 2318    Specimen: Urine, Clean Catch Updated: 02/11/22 2344     RBC, UA 0-2 /HPF      WBC, UA 6-12 /HPF      Bacteria, UA Trace /HPF      Squamous Epithelial Cells, UA 7-12 /HPF      Hyaline Casts, UA None Seen /LPF      Methodology Manual Light Microscopy    Urinalysis With Microscopic If Indicated (No Culture) - Urine, Clean Catch [[id number]]  (Abnormal) Collected: 02/11/22 2318    Specimen: Urine, Clean Catch Updated: 02/11/22 2334     Color, UA Yellow     Appearance, UA Clear     pH, UA <=5.0     Specific Gravity, UA 1.076     Glucose,  mg/dL (1+)     Ketones, UA Trace     Bilirubin, UA Negative     Blood, UA Large (3+)     Protein, UA >=300 mg/dL (3+)     Leuk Esterase, UA Trace     Nitrite, UA Negative     Urobilinogen, UA 0.2 E.U./dL    Hemoglobin & Hematocrit, Blood [[id number]]  (Abnormal) Collected: 02/11/22 2053     Specimen: Blood Updated: 02/11/22 2150     Hemoglobin 10.6 g/dL      Hematocrit 31.9 %     POC Glucose Once [[id number]]  (Abnormal) Collected: 02/11/22 1939    Specimen: Blood Updated: 02/11/22 1940     Glucose 305 mg/dL      Comment: Serial Number: [id number]:  [id number]       CK [[id number]]  (Normal) Collected: 02/11/22 1852    Specimen: Blood Updated: 02/11/22 1924     Creatine Kinase 122 U/L     Hemoglobin & Hematocrit, Blood [[id number]]  (Abnormal) Collected: 02/11/22 1852    Specimen: Blood Updated: 02/11/22 1902     Hemoglobin 10.7 g/dL      Hematocrit 31.9 %     Hemoglobin & Hematocrit, Blood [[id number]]  (Abnormal) Collected: 02/11/22 1854    Specimen: Blood Updated: 02/11/22 1901     Hemoglobin 11.0 g/dL      Hematocrit 33.1 %     POC Glucose Once [[id number]]  (Abnormal) Collected: 02/11/22 1749    Specimen: Blood Updated: 02/11/22 1750     Glucose 346 mg/dL      Comment: Serial Number: [id number]:  [id number]       COVID PRE-OP / PRE-PROCEDURE SCREENING ORDER (NO ISOLATION) - Swab, Nasopharynx [[id number]]  (Normal) Collected: 02/11/22 1341    Specimen: Swab from Nasopharynx Updated: 02/11/22 1406    Narrative:      The following orders were created for panel order COVID PRE-OP / PRE-PROCEDURE SCREENING ORDER (NO ISOLATION) - Swab, Nasopharynx.  Procedure                               Abnormality         Status                     ---------                               -----------         ------                     COVID-19,CEPHEID/DINO,CO...[[id number]]  Normal              Final result                 Please view results for these tests on the individual orders.    COVID-19,CEPHEID/DINO,COR/GA/PAD/JOSH IN-HOUSE(OR EMERGENT/ADD-ON),NP SWAB IN TRANSPORT MEDIA 3-4 HR TAT, RT-PCR - Swab, Nasopharynx [[id number]]  (Normal) Collected: 02/11/22 1341    Specimen: Swab from Nasopharynx Updated: 02/11/22 1406     COVID19 Not Detected    Narrative:      Fact sheet for providers:  https://www.fda.gov/media/311176/download     Fact sheet for patients: https://www.fda.gov/media/907099/download  Fact sheet for providers: https://www.fda.gov/media/624563/download    Fact sheet for patients: https://www.fda.gov/media/382564/download    Test performed by PCR.    aPTT [[id number]]  (Normal) Collected: 02/11/22 1341    Specimen: Blood Updated: 02/11/22 1401     PTT 24.2 seconds     Protime-INR [[id number]]  (Normal) Collected: 02/11/22 1341    Specimen: Blood Updated: 02/11/22 1401     Protime 10.9 Seconds      INR 0.98    Hemoglobin & Hematocrit, Blood [[id number]]  (Abnormal) Collected: 02/11/22 1341    Specimen: Blood Updated: 02/11/22 1348     Hemoglobin 10.7 g/dL      Hematocrit 31.5 %            Imaging Results (Last 24 Hours)     Procedure Component Value Units Date/Time    IR Angiogram Renal 1st Order [[id number]] Collected: 02/11/22 1607     Updated: 02/11/22 1617    Narrative:      DATE OF EXAM:  2/11/2022 2:47 PM     PROCEDURE:  IR ANGIOGRAM RENAL 1ST ORDER-     INDICATIONS:  Fall/trauma with large left perinephric or subcapsular hematoma and  evidence of active hemorrhage by CT scan. Interventional radiology  consulted for possible embolization of bleeding left renal artery  branch. W19.XXXA-Unspecified fall, initial encounter; S37.012A-Minor  contusion of left kidney, initial encounter     COMPARISON:  CT scan performed earlier same day.     FLUOROSCOPIC TIME:  3 minutes 9 seconds     PHYSICIAN MONITORED CONSCIOUS SEDATION TIME:  31 minutes     CONTRAST MEDIA:  45 cc Isovue-300     DESCRIPTION OF PROCEDURE:  The patient's medications were reviewed prior to the procedure. The  procedure, risks and alternatives were discussed and informed written  consent was obtained. Maximal sterile barrier technique was utilized  throughout the procedure. The patient was placed supine on the  fluoroscopy table. The right groin was prepped and draped using maximum  sterile barrier technique. Lidocaine was  used for local anesthesia.  Access was obtained of the right common femoral artery under direct  ultrasound visualization using a micropuncture set. A Bentson wire was  advanced into the abdominal aorta and a 5 Maltese hemostatic sheath  placed. Using a 4 Maltese Ashley's hook catheter, the left renal artery  was selectively catheterized. Multiple left renal angiograms were then  performed in multiple obliquities. No abnormal vessels are demonstrated.  No evidence of pseudoaneurysm or active extravasation is identified.  There is normal parenchymal enhancement of the left kidney. Incidental  note is made of moderate narrowing of the origin of the left renal  artery. This may be due to kinking from the rotational effects of the  large perinephric hematoma, however.     A right femoral angiogram was performed to confirm anatomy appropriate  for use a closure device. The site was reprepped sterilely. Additional  lidocaine was given subcutaneously. Gloves were changed. Hemostasis was  achieved using an Angio-Seal closure device. Proximal and distal pulses  were intact following placement.     The procedure was performed under moderate conscious sedation with  continuous monitoring using Versed and fentanyl. 31 minutes of physician  monitored sedation were provided.       Impression:      Multiple left renal angiograms demonstrate no evidence of continued  active hemorrhage, pseudoaneurysm or abnormal vessel. No embolization  was performed or required.     Electronically Signed By-Juice Willingham MD On:2/11/2022 4:15 PM  This report was finalized on [id number] by  Juice Willingham MD.    CT Chest With Contrast Diagnostic [[id number]] Collected: 02/11/22 1229     Updated: 02/11/22 1236    Narrative:      CT CHEST W CONTRAST DIAGNOSTIC-, CT ABDOMEN PELVIS W CONTRAST-     Date of Exam: 2/11/2022 11:50 AM     Indication: Trauma severe left rib pain left upper quadrant pain.     Comparison: [facility] portable chest 11/23/2021. No prior  abdominal/pelvic  imaging for comparison at this institution.     Technique: Serial and axial CT images of the chest were obtained  following the uneventful intravenous administration of 100 cc Isovue-370  contrast. Reconstructions in the coronal and sagittal planes were also  performed.  Automated exposure control and iterative reconstruction  methods were used.     FINDINGS:     CHEST: No acute osseous abnormalities are seen within the chest. No  displaced rib fracture is identified. There surgical changes of anterior  cervical spine fusion.     Heart size is normal. Mild coronary artery calcifications are present.  No pericardial effusion. No pleural effusion. No pneumothorax. Benign  calcified granuloma is present in the right lower lobe. Benign calcified  lymph node is seen in the right hilum. Minimal dependent atelectasis in  the lungs. No acute airspace disease.           ABDOMEN AND PELVIS: 1.2 x 4.1 x 1.4 cm left renal subcapsular hematoma  is seen. Serpiginous high density material consistent with active IV  contrast extravasation, is seen within the hematoma, consistent with  active bleeding. No discrete, qing laceration defect is identified on  this examination. A small cyst projects anteriorly from the left lower  renal pole measuring 17 x 12 mm.     Right kidney, liver, gallbladder, spleen, pancreas and adrenal glands  are normal. Normal appendix. The bowel does not appear thickened or  inflamed. Urinary bladder, uterus, and rectum are normal. There is mild  left-sided retroperitoneal stranding extending from the level of the  kidney into the pelvis.     No acute osseous abnormality is seen within the abdomen or pelvis.             Impression:      1. Large left renal subcapsular hematoma with signs of active bleeding.  I notified Dr. Martinez in the emergency room prior to the time of this  dictation.  2. No acute findings in the chest or pelvis.     Electronically Signed By-Laura Garcia MD  On:2/11/2022 12:34 PM  This report was finalized on [id number] by  Laura Garcia MD.    CT Abdomen Pelvis With Contrast [[id number]] Collected: 02/11/22 1229     Updated: 02/11/22 1236    Narrative:      CT CHEST W CONTRAST DIAGNOSTIC-, CT ABDOMEN PELVIS W CONTRAST-     Date of Exam: 2/11/2022 11:50 AM     Indication: Trauma severe left rib pain left upper quadrant pain.     Comparison: [facility] portable chest 11/23/2021. No prior abdominal/pelvic  imaging for comparison at this institution.     Technique: Serial and axial CT images of the chest were obtained  following the uneventful intravenous administration of 100 cc Isovue-370  contrast. Reconstructions in the coronal and sagittal planes were also  performed.  Automated exposure control and iterative reconstruction  methods were used.     FINDINGS:     CHEST: No acute osseous abnormalities are seen within the chest. No  displaced rib fracture is identified. There surgical changes of anterior  cervical spine fusion.     Heart size is normal. Mild coronary artery calcifications are present.  No pericardial effusion. No pleural effusion. No pneumothorax. Benign  calcified granuloma is present in the right lower lobe. Benign calcified  lymph node is seen in the right hilum. Minimal dependent atelectasis in  the lungs. No acute airspace disease.           ABDOMEN AND PELVIS: 1.2 x 4.1 x 1.4 cm left renal subcapsular hematoma  is seen. Serpiginous high density material consistent with active IV  contrast extravasation, is seen within the hematoma, consistent with  active bleeding. No discrete, qing laceration defect is identified on  this examination. A small cyst projects anteriorly from the left lower  renal pole measuring 17 x 12 mm.     Right kidney, liver, gallbladder, spleen, pancreas and adrenal glands  are normal. Normal appendix. The bowel does not appear thickened or  inflamed. Urinary bladder, uterus, and rectum are normal. There is mild  left-sided  retroperitoneal stranding extending from the level of the  kidney into the pelvis.     No acute osseous abnormality is seen within the abdomen or pelvis.             Impression:      1. Large left renal subcapsular hematoma with signs of active bleeding.  I notified Dr. Martinez in the emergency room prior to the time of this  dictation.  2. No acute findings in the chest or pelvis.     Electronically Signed By-Laura Garcia MD On:2/11/2022 12:34 PM  This report was finalized on [id number] by  Laura Garcia MD.               I reviewed the patient's new clinical results.    Medication Review:   Scheduled Meds:amLODIPine, 10 mg, Oral, Q24H  atorvastatin, 40 mg, Oral, Nightly  cefTRIAXone, 1 g, Intravenous, Q24H  gabapentin, 300 mg, Oral, Daily With Breakfast & Lunch  gabapentin, 600 mg, Oral, Nightly  insulin lispro, 0-9 Units, Subcutaneous, TID AC  lidocaine, 2 patch, Transdermal, Q24H  lisinopril, 20 mg, Oral, Daily  metFORMIN ER, 1,000 mg, Oral, BID  pantoprazole, 40 mg, Oral, Q AM  sertraline, 150 mg, Oral, Daily  sodium chloride, 10 mL, Intravenous, Q12H      Continuous Infusions:   PRN Meds:.dextrose  •  dextrose  •  glucagon (human recombinant)  •  hydrALAZINE  •  HYDROcodone-acetaminophen  •  insulin lispro **AND** insulin lispro  •  melatonin  •  Morphine  •  ondansetron **OR** ondansetron  •  [COMPLETED] Insert peripheral IV **AND** sodium chloride  •  sodium chloride     Assessment/Plan       Fall    Kidney hematoma    Diabetes mellitus with coincident hypertension (HCC)    Type 2 diabetes mellitus with hyperlipidemia (HCC)    Diabetes mellitus with polyneuropathy (HCC)  Blood loss anemia - continue q 4 hour h/h; transfuse below 8  Elevated creatinine - hydrate; repeat in am; stopping metformin  Rib contusion - Lidocaine  Incentive spirometer, increase activity level  Hypotension - holding home BP meds  DM-II with vascular disease - holding metformin; use SSI        Plan for disposition:HAYLEY Hsu  MD Christine  02/12/22  12:02 EST

## 2022-02-12 NOTE — NURSING NOTE
Notified Dr King via telephone of pt hgb results. 2/12 @ 0536 hgb 8.7, 2/12 @0024 hgb 9.8, 2/11 @2053 hgb 10.6 and 2/11 @1854 hgb11, Pt AOx4 last BP 91/59 (67) and HR 91. Pt Right Groin dressing clean dry and intact. No drainage/bleeding/swelling noted. New order received and noted to get HH in 2 hours instead of 4 hours. Will cont to monitor this shift.

## 2022-02-12 NOTE — PROGRESS NOTES
"[facility] Progress Note    Chief Complaint:  I want to go home          Vital Signs  BP 93/57 (BP Location: Left arm, Patient Position: Lying)   Pulse 89   Temp 99.1 °F (37.3 °C) (Oral)   Resp 15   Ht 177.8 cm (70\")   Wt (!) 141 kg (310 lb)   SpO2 98%   BMI 44.48 kg/m²          Results Review:      Lab Results (last 24 hours)     Procedure Component Value Units Date/Time    Hemoglobin & Hematocrit, Blood [[id number]]  (Abnormal) Collected: 02/12/22 0811    Specimen: Blood Updated: 02/12/22 0821     Hemoglobin 8.6 g/dL      Hematocrit 25.3 %     POC Glucose Once [[id number]]  (Abnormal) Collected: 02/12/22 0704    Specimen: Blood Updated: 02/12/22 0705     Glucose 180 mg/dL      Comment: Serial Number: [id number]:  [id number]       Comprehensive Metabolic Panel [[id number]]  (Abnormal) Collected: 02/12/22 0536    Specimen: Blood Updated: 02/12/22 0613     Glucose 179 mg/dL      BUN 38 mg/dL      Creatinine 1.60 mg/dL      Sodium 136 mmol/L      Potassium 5.4 mmol/L      Chloride 102 mmol/L      CO2 24.0 mmol/L      Calcium 8.5 mg/dL      Total Protein 5.2 g/dL      Albumin 3.00 g/dL      ALT (SGPT) 10 U/L      AST (SGOT) 10 U/L      Alkaline Phosphatase 61 U/L      Total Bilirubin 0.2 mg/dL      eGFR Non African Amer 32 mL/min/1.73      Globulin 2.2 gm/dL      A/G Ratio 1.4 g/dL      BUN/Creatinine Ratio 23.8     Anion Gap 10.0 mmol/L     Narrative:      GFR Normal >60  Chronic Kidney Disease <60  Kidney Failure <15      CBC Auto Differential [[id number]]  (Abnormal) Collected: 02/12/22 0536    Specimen: Blood Updated: 02/12/22 0547     WBC 12.20 10*3/mm3      RBC 2.88 10*6/mm3      Hemoglobin 8.7 g/dL      Hematocrit 26.0 %      MCV 90.3 fL      MCH 30.3 pg      MCHC 33.5 g/dL      RDW 14.0 %      RDW-SD 44.6 fl      MPV 7.4 fL      Platelets 286 10*3/mm3      Neutrophil % 77.3 %      Lymphocyte % 14.4 %      Monocyte % 7.7 %      Eosinophil % 0.0 %      Basophil % 0.6 %      Neutrophils, Absolute 9.40 " 10*3/mm3      Lymphocytes, Absolute 1.80 10*3/mm3      Monocytes, Absolute 0.90 10*3/mm3      Eosinophils, Absolute 0.00 10*3/mm3      Basophils, Absolute 0.10 10*3/mm3      nRBC 0.0 /100 WBC     Hemoglobin & Hematocrit, Blood [[id number]]  (Abnormal) Collected: 02/12/22 0024    Specimen: Blood Updated: 02/12/22 0104     Hemoglobin 9.8 g/dL      Hematocrit 29.0 %     Urinalysis, Microscopic Only - Urine, Clean Catch [[id number]]  (Abnormal) Collected: 02/11/22 2318    Specimen: Urine, Clean Catch Updated: 02/11/22 2344     RBC, UA 0-2 /HPF      WBC, UA 6-12 /HPF      Bacteria, UA Trace /HPF      Squamous Epithelial Cells, UA 7-12 /HPF      Hyaline Casts, UA None Seen /LPF      Methodology Manual Light Microscopy    Urinalysis With Microscopic If Indicated (No Culture) - Urine, Clean Catch [[id number]]  (Abnormal) Collected: 02/11/22 2318    Specimen: Urine, Clean Catch Updated: 02/11/22 2334     Color, UA Yellow     Appearance, UA Clear     pH, UA <=5.0     Specific Gravity, UA 1.076     Glucose,  mg/dL (1+)     Ketones, UA Trace     Bilirubin, UA Negative     Blood, UA Large (3+)     Protein, UA >=300 mg/dL (3+)     Leuk Esterase, UA Trace     Nitrite, UA Negative     Urobilinogen, UA 0.2 E.U./dL    Hemoglobin & Hematocrit, Blood [[id number]]  (Abnormal) Collected: 02/11/22 2053    Specimen: Blood Updated: 02/11/22 2150     Hemoglobin 10.6 g/dL      Hematocrit 31.9 %     POC Glucose Once [[id number]]  (Abnormal) Collected: 02/11/22 1939    Specimen: Blood Updated: 02/11/22 1940     Glucose 305 mg/dL      Comment: Serial Number: [id number]:  [id number]       CK [[id number]]  (Normal) Collected: 02/11/22 1852    Specimen: Blood Updated: 02/11/22 1924     Creatine Kinase 122 U/L     Hemoglobin & Hematocrit, Blood [[id number]]  (Abnormal) Collected: 02/11/22 1852    Specimen: Blood Updated: 02/11/22 1902     Hemoglobin 10.7 g/dL      Hematocrit 31.9 %     Hemoglobin & Hematocrit, Blood [[id number]]  (Abnormal)  Collected: 02/11/22 1854    Specimen: Blood Updated: 02/11/22 1901     Hemoglobin 11.0 g/dL      Hematocrit 33.1 %     POC Glucose Once [[id number]]  (Abnormal) Collected: 02/11/22 1749    Specimen: Blood Updated: 02/11/22 1750     Glucose 346 mg/dL      Comment: Serial Number: [id number]:  [id number]       COVID PRE-OP / PRE-PROCEDURE SCREENING ORDER (NO ISOLATION) - Swab, Nasopharynx [[id number]]  (Normal) Collected: 02/11/22 1341    Specimen: Swab from Nasopharynx Updated: 02/11/22 1406    Narrative:      The following orders were created for panel order COVID PRE-OP / PRE-PROCEDURE SCREENING ORDER (NO ISOLATION) - Swab, Nasopharynx.  Procedure                               Abnormality         Status                     ---------                               -----------         ------                     COVID-19,CEPHEID/DINO,CO...[[id number]]  Normal              Final result                 Please view results for these tests on the individual orders.    COVID-19,CEPHEID/DINO,COR/GA/PAD/JOSH IN-HOUSE(OR EMERGENT/ADD-ON),NP SWAB IN TRANSPORT MEDIA 3-4 HR TAT, RT-PCR - Swab, Nasopharynx [[id number]]  (Normal) Collected: 02/11/22 1341    Specimen: Swab from Nasopharynx Updated: 02/11/22 1406     COVID19 Not Detected    Narrative:      Fact sheet for providers: https://www.fda.gov/media/224572/download     Fact sheet for patients: https://www.fda.gov/media/554395/download  Fact sheet for providers: https://www.fda.gov/media/189455/download    Fact sheet for patients: https://www.fda.gov/media/370792/download    Test performed by PCR.    aPTT [[id number]]  (Normal) Collected: 02/11/22 1341    Specimen: Blood Updated: 02/11/22 1401     PTT 24.2 seconds     Protime-INR [[id number]]  (Normal) Collected: 02/11/22 1341    Specimen: Blood Updated: 02/11/22 1401     Protime 10.9 Seconds      INR 0.98    Hemoglobin & Hematocrit, Blood [[id number]]  (Abnormal) Collected: 02/11/22 1341    Specimen: Blood Updated: 02/11/22  1348     Hemoglobin 10.7 g/dL      Hematocrit 31.5 %     Comprehensive Metabolic Panel [[id number]]  (Abnormal) Collected: 02/11/22 1040    Specimen: Blood Updated: 02/11/22 1108     Glucose 288 mg/dL      BUN 28 mg/dL      Creatinine 0.81 mg/dL      Sodium 138 mmol/L      Potassium 4.3 mmol/L      Chloride 103 mmol/L      CO2 21.0 mmol/L      Calcium 8.9 mg/dL      Total Protein 6.0 g/dL      Albumin 3.50 g/dL      ALT (SGPT) 14 U/L      AST (SGOT) 13 U/L      Alkaline Phosphatase 77 U/L      Total Bilirubin 0.3 mg/dL      eGFR Non African Amer 71 mL/min/1.73      Globulin 2.5 gm/dL      A/G Ratio 1.4 g/dL      BUN/Creatinine Ratio 34.6     Anion Gap 14.0 mmol/L     Narrative:      GFR Normal >60  Chronic Kidney Disease <60  Kidney Failure <15      Lipase [[id number]]  (Normal) Collected: 02/11/22 1040    Specimen: Blood Updated: 02/11/22 1108     Lipase 51 U/L     Amylase [[id number]]  (Normal) Collected: 02/11/22 1040    Specimen: Blood Updated: 02/11/22 1108     Amylase 56 U/L     CBC & Differential [[id number]]  (Abnormal) Collected: 02/11/22 1040    Specimen: Blood Updated: 02/11/22 1048    Narrative:      The following orders were created for panel order CBC & Differential.  Procedure                               Abnormality         Status                     ---------                               -----------         ------                     CBC Auto Differential[[id number]]        Abnormal            Final result                 Please view results for these tests on the individual orders.    CBC Auto Differential [[id number]]  (Abnormal) Collected: 02/11/22 1040    Specimen: Blood Updated: 02/11/22 1048     WBC 15.60 10*3/mm3      RBC 3.88 10*6/mm3      Hemoglobin 11.5 g/dL      Hematocrit 34.4 %      MCV 88.7 fL      MCH 29.6 pg      MCHC 33.4 g/dL      RDW 13.6 %      RDW-SD 42.9 fl      MPV 7.2 fL      Platelets 351 10*3/mm3      Neutrophil % 86.7 %      Lymphocyte % 9.5 %      Monocyte % 3.2 %       Eosinophil % 0.3 %      Basophil % 0.3 %      Neutrophils, Absolute 13.50 10*3/mm3      Lymphocytes, Absolute 1.50 10*3/mm3      Monocytes, Absolute 0.50 10*3/mm3      Eosinophils, Absolute 0.00 10*3/mm3      Basophils, Absolute 0.00 10*3/mm3      nRBC 0.1 /100 WBC         Imaging Results (Last 24 Hours)     Procedure Component Value Units Date/Time    IR Angiogram Renal 1st Order [[id number]] Collected: 02/11/22 1607     Updated: 02/11/22 1617    Narrative:      DATE OF EXAM:  2/11/2022 2:47 PM     PROCEDURE:  IR ANGIOGRAM RENAL 1ST ORDER-     INDICATIONS:  Fall/trauma with large left perinephric or subcapsular hematoma and  evidence of active hemorrhage by CT scan. Interventional radiology  consulted for possible embolization of bleeding left renal artery  branch. W19.XXXA-Unspecified fall, initial encounter; S37.012A-Minor  contusion of left kidney, initial encounter     COMPARISON:  CT scan performed earlier same day.     FLUOROSCOPIC TIME:  3 minutes 9 seconds     PHYSICIAN MONITORED CONSCIOUS SEDATION TIME:  31 minutes     CONTRAST MEDIA:  45 cc Isovue-300     DESCRIPTION OF PROCEDURE:  The patient's medications were reviewed prior to the procedure. The  procedure, risks and alternatives were discussed and informed written  consent was obtained. Maximal sterile barrier technique was utilized  throughout the procedure. The patient was placed supine on the  fluoroscopy table. The right groin was prepped and draped using maximum  sterile barrier technique. Lidocaine was used for local anesthesia.  Access was obtained of the right common femoral artery under direct  ultrasound visualization using a micropuncture set. A Bentson wire was  advanced into the abdominal aorta and a 5 Mosotho hemostatic sheath  placed. Using a 4 Mosotho Ashley's hook catheter, the left renal artery  was selectively catheterized. Multiple left renal angiograms were then  performed in multiple obliquities. No abnormal vessels are  demonstrated.  No evidence of pseudoaneurysm or active extravasation is identified.  There is normal parenchymal enhancement of the left kidney. Incidental  note is made of moderate narrowing of the origin of the left renal  artery. This may be due to kinking from the rotational effects of the  large perinephric hematoma, however.     A right femoral angiogram was performed to confirm anatomy appropriate  for use a closure device. The site was reprepped sterilely. Additional  lidocaine was given subcutaneously. Gloves were changed. Hemostasis was  achieved using an Angio-Seal closure device. Proximal and distal pulses  were intact following placement.     The procedure was performed under moderate conscious sedation with  continuous monitoring using Versed and fentanyl. 31 minutes of physician  monitored sedation were provided.       Impression:      Multiple left renal angiograms demonstrate no evidence of continued  active hemorrhage, pseudoaneurysm or abnormal vessel. No embolization  was performed or required.     Electronically Signed By-Juice Willingham MD On:2/11/2022 4:15 PM  This report was finalized on [id number] by  Juice Willingham MD.    CT Chest With Contrast Diagnostic [[id number]] Collected: 02/11/22 1229     Updated: 02/11/22 1236    Narrative:      CT CHEST W CONTRAST DIAGNOSTIC-, CT ABDOMEN PELVIS W CONTRAST-     Date of Exam: 2/11/2022 11:50 AM     Indication: Trauma severe left rib pain left upper quadrant pain.     Comparison: [facility] portable chest 11/23/2021. No prior abdominal/pelvic  imaging for comparison at this institution.     Technique: Serial and axial CT images of the chest were obtained  following the uneventful intravenous administration of 100 cc Isovue-370  contrast. Reconstructions in the coronal and sagittal planes were also  performed.  Automated exposure control and iterative reconstruction  methods were used.     FINDINGS:     CHEST: No acute osseous abnormalities are seen within the  chest. No  displaced rib fracture is identified. There surgical changes of anterior  cervical spine fusion.     Heart size is normal. Mild coronary artery calcifications are present.  No pericardial effusion. No pleural effusion. No pneumothorax. Benign  calcified granuloma is present in the right lower lobe. Benign calcified  lymph node is seen in the right hilum. Minimal dependent atelectasis in  the lungs. No acute airspace disease.           ABDOMEN AND PELVIS: 1.2 x 4.1 x 1.4 cm left renal subcapsular hematoma  is seen. Serpiginous high density material consistent with active IV  contrast extravasation, is seen within the hematoma, consistent with  active bleeding. No discrete, qing laceration defect is identified on  this examination. A small cyst projects anteriorly from the left lower  renal pole measuring 17 x 12 mm.     Right kidney, liver, gallbladder, spleen, pancreas and adrenal glands  are normal. Normal appendix. The bowel does not appear thickened or  inflamed. Urinary bladder, uterus, and rectum are normal. There is mild  left-sided retroperitoneal stranding extending from the level of the  kidney into the pelvis.     No acute osseous abnormality is seen within the abdomen or pelvis.             Impression:      1. Large left renal subcapsular hematoma with signs of active bleeding.  I notified Dr. Martinez in the emergency room prior to the time of this  dictation.  2. No acute findings in the chest or pelvis.     Electronically Signed By-Laura Garcia MD On:2/11/2022 12:34 PM  This report was finalized on [id number] by  Laura Garcia MD.    CT Abdomen Pelvis With Contrast [[id number]] Collected: 02/11/22 1229     Updated: 02/11/22 1236    Narrative:      CT CHEST W CONTRAST DIAGNOSTIC-, CT ABDOMEN PELVIS W CONTRAST-     Date of Exam: 2/11/2022 11:50 AM     Indication: Trauma severe left rib pain left upper quadrant pain.     Comparison: [facility] portable chest 11/23/2021. No prior  abdominal/pelvic  imaging for comparison at this institution.     Technique: Serial and axial CT images of the chest were obtained  following the uneventful intravenous administration of 100 cc Isovue-370  contrast. Reconstructions in the coronal and sagittal planes were also  performed.  Automated exposure control and iterative reconstruction  methods were used.     FINDINGS:     CHEST: No acute osseous abnormalities are seen within the chest. No  displaced rib fracture is identified. There surgical changes of anterior  cervical spine fusion.     Heart size is normal. Mild coronary artery calcifications are present.  No pericardial effusion. No pleural effusion. No pneumothorax. Benign  calcified granuloma is present in the right lower lobe. Benign calcified  lymph node is seen in the right hilum. Minimal dependent atelectasis in  the lungs. No acute airspace disease.           ABDOMEN AND PELVIS: 1.2 x 4.1 x 1.4 cm left renal subcapsular hematoma  is seen. Serpiginous high density material consistent with active IV  contrast extravasation, is seen within the hematoma, consistent with  active bleeding. No discrete, qing laceration defect is identified on  this examination. A small cyst projects anteriorly from the left lower  renal pole measuring 17 x 12 mm.     Right kidney, liver, gallbladder, spleen, pancreas and adrenal glands  are normal. Normal appendix. The bowel does not appear thickened or  inflamed. Urinary bladder, uterus, and rectum are normal. There is mild  left-sided retroperitoneal stranding extending from the level of the  kidney into the pelvis.     No acute osseous abnormality is seen within the abdomen or pelvis.             Impression:      1. Large left renal subcapsular hematoma with signs of active bleeding.  I notified Dr. Martinez in the emergency room prior to the time of this  dictation.  2. No acute findings in the chest or pelvis.     Electronically Signed By-Laura Garcia MD  On:2/11/2022 12:34 PM  This report was finalized on [id number] by  Laura Garcia MD.          Medication Review:   I have personally reviewed    Current Facility-Administered Medications:   •  amLODIPine (NORVASC) tablet 10 mg, 10 mg, Oral, Q24H, Yesenia Bustamanet, APRN, 10 mg at 02/12/22 0822  •  atorvastatin (LIPITOR) tablet 40 mg, 40 mg, Oral, Nightly, Yesenia Bustamante, APRN, 40 mg at 02/11/22 2017  •  cefTRIAXone (ROCEPHIN) in [facility] 1 gram/10ml IV PUSH syringe, 1 g, Intravenous, Q24H, Aracelis Knig MD, 1 g at 02/11/22 1632  •  dextrose (D50W) (25 g/50 mL) IV injection 25 g, 25 g, Intravenous, Q15 Min PRN, Yesenia Bustamante, APRN  •  dextrose (GLUTOSE) oral gel 15 g, 15 g, Oral, Q15 Min PRN, Yesenia Bustamante, APRN  •  gabapentin (NEURONTIN) capsule 300 mg, 300 mg, Oral, Daily With Breakfast & Lunch, Yesenia Bustamante, APRN, 300 mg at 02/12/22 0822  •  gabapentin (NEURONTIN) capsule 600 mg, 600 mg, Oral, Nightly, Yesenia Bustamante, APRN, 600 mg at 02/11/22 2017  •  glucagon (human recombinant) (GLUCAGEN DIAGNOSTIC) 1 mg in sterile water (preservative free) 1 mL injection, 1 mg, Intramuscular, Q15 Min PRN, Yesenia Bustamante, APRN  •  hydrALAZINE (APRESOLINE) injection 10 mg, 10 mg, Intravenous, Q6H PRN, Yesenia Bustamante, APRN  •  HYDROcodone-acetaminophen (NORCO) 7.5-325 MG per tablet 1 tablet, 1 tablet, Oral, Q6H PRN, Aracelis King MD, 1 tablet at 02/12/22 0821  •  insulin lispro (ADMELOG) injection 0-9 Units, 0-9 Units, Subcutaneous, TID AC, 2 Units at 02/12/22 0821 **AND** insulin lispro (ADMELOG) injection 0-9 Units, 0-9 Units, Subcutaneous, PRN, Yesenia Bustamante APRN  •  lisinopril (PRINIVIL,ZESTRIL) tablet 20 mg, 20 mg, Oral, Daily, Yesenia Bustamante APRN, 20 mg at 02/12/22 0823  •  melatonin tablet 5 mg, 5 mg, Oral, Nightly PRN, Yesenia Bustamante APRN  •  metFORMIN ER (GLUCOPHAGE-XR) 24 hr tablet 1,000 mg, 1,000 mg, Oral, BID, Yesenia Bustamante APRN  •  Morphine sulfate (PF) injection 4 mg, 4 mg, Intravenous,  Q3H PRN, Aracelis King MD, 4 mg at 02/11/22 1812  •  ondansetron (ZOFRAN) tablet 4 mg, 4 mg, Oral, Q6H PRN **OR** ondansetron (ZOFRAN) injection 4 mg, 4 mg, Intravenous, Q6H PRN, Yesenia Bustamante C, APRN  •  pantoprazole (PROTONIX) EC tablet 40 mg, 40 mg, Oral, Q AM, Yesenia Bustamante C, APRN, 40 mg at 02/12/22 0614  •  sertraline (ZOLOFT) tablet 150 mg, 150 mg, Oral, Daily, GranYesenai gracia C, APRN, 150 mg at 02/12/22 0821  •  [COMPLETED] Insert peripheral IV, , , Once **AND** sodium chloride 0.9 % flush 10 mL, 10 mL, Intravenous, PRN, Baljit Martinez MD, 10 mL at 02/11/22 2017  •  sodium chloride 0.9 % flush 10 mL, 10 mL, Intravenous, Q12H, Yesenia Bustamante C, APRN, 10 mL at 02/12/22 0823  •  sodium chloride 0.9 % flush 10 mL, 10 mL, Intravenous, PRN, Yesenia Bustamante, APRN    Current Outpatient Medications:   •  amLODIPine (NORVASC) 10 MG tablet, Take 1 tablet by mouth Daily., Disp: , Rfl:   •  aspirin 325 MG tablet, Take 1 tablet by mouth Daily., Disp: , Rfl:   •  atorvastatin (LIPITOR) 40 MG tablet, Take 1 tablet by mouth Every Night., Disp: , Rfl:   •  gabapentin (NEURONTIN) 300 MG capsule, Take 300 mg by mouth Daily With Breakfast & Lunch., Disp: , Rfl:   •  gabapentin (NEURONTIN) 300 MG capsule, Take 600 mg by mouth Every Night., Disp: , Rfl:   •  insulin glargine (LANTUS, SEMGLEE) 100 UNIT/ML injection, Inject 15 Units under the skin into the appropriate area as directed Every 12 (Twelve) Hours., Disp: , Rfl: 12  •  lisinopril (PRINIVIL,ZESTRIL) 20 MG tablet, Take 1 tablet by mouth Daily., Disp: , Rfl:   •  metFORMIN ER (GLUCOPHAGE-XR) 500 MG 24 hr tablet, Take 1,000 mg by mouth 2 (Two) Times a Day., Disp: , Rfl:   •  sertraline (ZOLOFT) 100 MG tablet, Take 150 mg by mouth Daily., Disp: , Rfl:   •  vitamin B-12 (CYANOCOBALAMIN) 1000 MCG tablet, Take 1 tablet by mouth Daily., Disp: , Rfl:   •  acetaminophen (TYLENOL) 325 MG tablet, Take 2 tablets by mouth Every 4 (Four) Hours As Needed for Mild Pain ., Disp: , Rfl:    •  dextrose (GLUTOSE) 40 % gel, Take 15 g by mouth Every 15 (Fifteen) Minutes As Needed for Low Blood Sugar (Blood sugar less than 70)., Disp: , Rfl:   •  glucagon (human recombinant) 1 mg/mL in sterile water (preservative free) injection injection, Inject 1 mL under the skin into the appropriate area as directed Every 15 (Fifteen) Minutes As Needed (Blood Glucose Less Than 70)., Disp: , Rfl:   •  insulin lispro (ADMELOG) 100 UNIT/ML injection, Inject 0-24 Units under the skin into the appropriate area as directed 3 (Three) Times a Day Before Meals., Disp: , Rfl: 12  •  insulin lispro (ADMELOG) 100 UNIT/ML injection, Inject 0-24 Units under the skin into the appropriate area as directed As Needed for High Blood Sugar (Per the administration instructions)., Disp: , Rfl: 12  •  insulin lispro (ADMELOG) 100 UNIT/ML injection, Inject 5 Units under the skin into the appropriate area as directed 3 (Three) Times a Day With Meals., Disp: , Rfl: 12  •  melatonin 5 MG tablet tablet, Take 1 tablet by mouth At Night As Needed (insomnia)., Disp: , Rfl:     Allergies:    Patient has no known allergies.    Assessment:    Active Problems:  Patient Active Problem List   Diagnosis   • Cerebrovascular accident (CVA) ([facility])   • Left-sided weakness   • Dizziness   • Type 2 diabetes mellitus with hyperglycemia (HCC)   • Peripheral neuropathy   • Hyperlipidemia   • Anxiety associated with depression   • Obesity, Class III, BMI 40-49.9 (morbid obesity) ([facility])   • Acute UTI (urinary tract infection)   • Fall   • Kidney hematoma   • Diabetes mellitus with coincident hypertension (HCC)   • Type 2 diabetes mellitus with hyperlipidemia ([facility])   • Diabetes mellitus with polyneuropathy (HCC)       Renal hematoma    Plan:    Cont to monitor Hgb  May need blood transfusion       Nehemiah Colón MD    2/12/2022  09:24 EST

## 2022-02-13 ENCOUNTER — APPOINTMENT (OUTPATIENT)
Dept: CT IMAGING | Facility: HOSPITAL | Age: 67
End: 2022-02-13

## 2022-02-13 LAB
ABO GROUP BLD: NORMAL
ALBUMIN SERPL-MCNC: 3.3 G/DL (ref 3.5–5.2)
ALBUMIN/GLOB SERPL: 1.5 G/DL
ALP SERPL-CCNC: 61 U/L (ref 39–117)
ALT SERPL W P-5'-P-CCNC: 9 U/L (ref 1–33)
ANION GAP SERPL CALCULATED.3IONS-SCNC: 12 MMOL/L (ref 5–15)
AST SERPL-CCNC: 14 U/L (ref 1–32)
BACTERIA UR QL AUTO: ABNORMAL /HPF
BASOPHILS # BLD AUTO: 0 10*3/MM3 (ref 0–0.2)
BASOPHILS NFR BLD AUTO: 0.4 % (ref 0–1.5)
BILIRUB SERPL-MCNC: 0.3 MG/DL (ref 0–1.2)
BILIRUB UR QL STRIP: NEGATIVE
BLD GP AB SCN SERPL QL: NEGATIVE
BUN SERPL-MCNC: 43 MG/DL (ref 8–23)
BUN/CREAT SERPL: 24.7 (ref 7–25)
CALCIUM SPEC-SCNC: 8.1 MG/DL (ref 8.6–10.5)
CHLORIDE SERPL-SCNC: 102 MMOL/L (ref 98–107)
CK SERPL-CCNC: 151 U/L (ref 20–180)
CLARITY UR: CLEAR
CO2 SERPL-SCNC: 24 MMOL/L (ref 22–29)
COLOR UR: YELLOW
CREAT SERPL-MCNC: 1.74 MG/DL (ref 0.57–1)
DEPRECATED RDW RBC AUTO: 44.6 FL (ref 37–54)
EOSINOPHIL # BLD AUTO: 0.1 10*3/MM3 (ref 0–0.4)
EOSINOPHIL NFR BLD AUTO: 0.9 % (ref 0.3–6.2)
EOSINOPHIL SPEC QL MICRO: 0 % EOS/100 CELLS (ref 0–0)
ERYTHROCYTE [DISTWIDTH] IN BLOOD BY AUTOMATED COUNT: 14 % (ref 12.3–15.4)
GFR SERPL CREATININE-BSD FRML MDRD: 29 ML/MIN/1.73
GLOBULIN UR ELPH-MCNC: 2.2 GM/DL
GLUCOSE BLDC GLUCOMTR-MCNC: 154 MG/DL (ref 70–105)
GLUCOSE BLDC GLUCOMTR-MCNC: 154 MG/DL (ref 70–105)
GLUCOSE BLDC GLUCOMTR-MCNC: 174 MG/DL (ref 70–105)
GLUCOSE BLDC GLUCOMTR-MCNC: 179 MG/DL (ref 70–105)
GLUCOSE BLDC GLUCOMTR-MCNC: 183 MG/DL (ref 70–105)
GLUCOSE SERPL-MCNC: 209 MG/DL (ref 65–99)
GLUCOSE UR STRIP-MCNC: ABNORMAL MG/DL
HCT VFR BLD AUTO: 22.5 % (ref 34–46.6)
HCT VFR BLD AUTO: 23.8 % (ref 34–46.6)
HCT VFR BLD AUTO: 24.7 % (ref 34–46.6)
HCT VFR BLD AUTO: 25.1 % (ref 34–46.6)
HCT VFR BLD AUTO: 26 % (ref 34–46.6)
HGB BLD-MCNC: 7.5 G/DL (ref 12–15.9)
HGB BLD-MCNC: 8.2 G/DL (ref 12–15.9)
HGB BLD-MCNC: 8.5 G/DL (ref 12–15.9)
HGB BLD-MCNC: 8.6 G/DL (ref 12–15.9)
HGB BLD-MCNC: 8.8 G/DL (ref 12–15.9)
HGB UR QL STRIP.AUTO: ABNORMAL
HYALINE CASTS UR QL AUTO: ABNORMAL /LPF
IRON 24H UR-MRATE: 22 MCG/DL (ref 37–145)
KETONES UR QL STRIP: NEGATIVE
LEUKOCYTE ESTERASE UR QL STRIP.AUTO: ABNORMAL
LYMPHOCYTES # BLD AUTO: 2.2 10*3/MM3 (ref 0.7–3.1)
LYMPHOCYTES NFR BLD AUTO: 18.7 % (ref 19.6–45.3)
MCH RBC QN AUTO: 30.3 PG (ref 26.6–33)
MCHC RBC AUTO-ENTMCNC: 33.5 G/DL (ref 31.5–35.7)
MCV RBC AUTO: 90.5 FL (ref 79–97)
MONOCYTES # BLD AUTO: 0.9 10*3/MM3 (ref 0.1–0.9)
MONOCYTES NFR BLD AUTO: 7.6 % (ref 5–12)
NEUTROPHILS NFR BLD AUTO: 72.4 % (ref 42.7–76)
NEUTROPHILS NFR BLD AUTO: 8.6 10*3/MM3 (ref 1.7–7)
NITRITE UR QL STRIP: NEGATIVE
NRBC BLD AUTO-RTO: 0 /100 WBC (ref 0–0.2)
PH UR STRIP.AUTO: <=5 [PH] (ref 5–8)
PLATELET # BLD AUTO: 241 10*3/MM3 (ref 140–450)
PMV BLD AUTO: 7.7 FL (ref 6–12)
POTASSIUM SERPL-SCNC: 4.7 MMOL/L (ref 3.5–5.2)
PROT SERPL-MCNC: 5.5 G/DL (ref 6–8.5)
PROT UR QL STRIP: ABNORMAL
RBC # BLD AUTO: 2.49 10*6/MM3 (ref 3.77–5.28)
RBC # UR STRIP: ABNORMAL /HPF
REF LAB TEST METHOD: ABNORMAL
RH BLD: POSITIVE
SODIUM SERPL-SCNC: 138 MMOL/L (ref 136–145)
SODIUM UR-SCNC: 33 MMOL/L
SP GR UR STRIP: 1.03 (ref 1–1.03)
SQUAMOUS #/AREA URNS HPF: ABNORMAL /HPF
T&S EXPIRATION DATE: NORMAL
TSH SERPL DL<=0.05 MIU/L-ACNC: 0.18 UIU/ML (ref 0.27–4.2)
URATE SERPL-MCNC: 7.2 MG/DL (ref 2.4–5.7)
UROBILINOGEN UR QL STRIP: ABNORMAL
WBC # UR STRIP: ABNORMAL /HPF
WBC NRBC COR # BLD: 11.8 10*3/MM3 (ref 3.4–10.8)

## 2022-02-13 PROCEDURE — 86923 COMPATIBILITY TEST ELECTRIC: CPT

## 2022-02-13 PROCEDURE — 63710000001 INSULIN LISPRO (HUMAN) PER 5 UNITS: Performed by: NURSE PRACTITIONER

## 2022-02-13 PROCEDURE — 86901 BLOOD TYPING SEROLOGIC RH(D): CPT | Performed by: INTERNAL MEDICINE

## 2022-02-13 PROCEDURE — 86850 RBC ANTIBODY SCREEN: CPT | Performed by: INTERNAL MEDICINE

## 2022-02-13 PROCEDURE — 25010000002 NA FERRIC GLUC CPLX PER 12.5 MG: Performed by: INTERNAL MEDICINE

## 2022-02-13 PROCEDURE — 84550 ASSAY OF BLOOD/URIC ACID: CPT | Performed by: INTERNAL MEDICINE

## 2022-02-13 PROCEDURE — 86900 BLOOD TYPING SEROLOGIC ABO: CPT

## 2022-02-13 PROCEDURE — 83540 ASSAY OF IRON: CPT | Performed by: INTERNAL MEDICINE

## 2022-02-13 PROCEDURE — 81001 URINALYSIS AUTO W/SCOPE: CPT | Performed by: INTERNAL MEDICINE

## 2022-02-13 PROCEDURE — 85014 HEMATOCRIT: CPT | Performed by: INTERNAL MEDICINE

## 2022-02-13 PROCEDURE — 86900 BLOOD TYPING SEROLOGIC ABO: CPT | Performed by: INTERNAL MEDICINE

## 2022-02-13 PROCEDURE — 87205 SMEAR GRAM STAIN: CPT | Performed by: INTERNAL MEDICINE

## 2022-02-13 PROCEDURE — 74174 CTA ABD&PLVS W/CONTRAST: CPT

## 2022-02-13 PROCEDURE — 84300 ASSAY OF URINE SODIUM: CPT | Performed by: INTERNAL MEDICINE

## 2022-02-13 PROCEDURE — 0 MORPHINE SULFATE 4 MG/ML SOLUTION: Performed by: INTERNAL MEDICINE

## 2022-02-13 PROCEDURE — 25010000002 HYDRALAZINE PER 20 MG: Performed by: NURSE PRACTITIONER

## 2022-02-13 PROCEDURE — 0 IOPAMIDOL PER 1 ML: Performed by: INTERNAL MEDICINE

## 2022-02-13 PROCEDURE — 25010000002 CEFTRIAXONE PER 250 MG: Performed by: INTERNAL MEDICINE

## 2022-02-13 PROCEDURE — 36430 TRANSFUSION BLD/BLD COMPNT: CPT

## 2022-02-13 PROCEDURE — 25010000002 CALCIUM GLUCONATE-NACL 1-0.675 GM/50ML-% SOLUTION: Performed by: INTERNAL MEDICINE

## 2022-02-13 PROCEDURE — 82962 GLUCOSE BLOOD TEST: CPT

## 2022-02-13 PROCEDURE — 85018 HEMOGLOBIN: CPT | Performed by: INTERNAL MEDICINE

## 2022-02-13 PROCEDURE — 82550 ASSAY OF CK (CPK): CPT | Performed by: INTERNAL MEDICINE

## 2022-02-13 PROCEDURE — 80050 GENERAL HEALTH PANEL: CPT | Performed by: NURSE PRACTITIONER

## 2022-02-13 PROCEDURE — P9016 RBC LEUKOCYTES REDUCED: HCPCS

## 2022-02-13 RX ORDER — SODIUM BICARBONATE 650 MG/1
1300 TABLET ORAL EVERY 4 HOURS
Status: COMPLETED | OUTPATIENT
Start: 2022-02-13 | End: 2022-02-13

## 2022-02-13 RX ORDER — SODIUM BICARBONATE 650 MG/1
1300 TABLET ORAL 2 TIMES DAILY
Status: DISCONTINUED | OUTPATIENT
Start: 2022-02-13 | End: 2022-02-13

## 2022-02-13 RX ORDER — GABAPENTIN 300 MG/1
300 CAPSULE ORAL NIGHTLY
Status: DISCONTINUED | OUTPATIENT
Start: 2022-02-13 | End: 2022-02-14 | Stop reason: HOSPADM

## 2022-02-13 RX ORDER — SODIUM BICARBONATE 650 MG/1
1300 TABLET ORAL ONCE
Status: DISCONTINUED | OUTPATIENT
Start: 2022-02-13 | End: 2022-02-13

## 2022-02-13 RX ORDER — CALCIUM GLUCONATE 20 MG/ML
1 INJECTION, SOLUTION INTRAVENOUS ONCE
Status: COMPLETED | OUTPATIENT
Start: 2022-02-13 | End: 2022-02-13

## 2022-02-13 RX ADMIN — Medication 1200 MG: at 20:28

## 2022-02-13 RX ADMIN — GABAPENTIN 300 MG: 300 CAPSULE ORAL at 20:28

## 2022-02-13 RX ADMIN — HYDRALAZINE HYDROCHLORIDE 10 MG: 20 INJECTION INTRAMUSCULAR; INTRAVENOUS at 20:52

## 2022-02-13 RX ADMIN — Medication 1200 MG: at 09:05

## 2022-02-13 RX ADMIN — MORPHINE SULFATE 4 MG: 4 INJECTION INTRAVENOUS at 14:06

## 2022-02-13 RX ADMIN — INSULIN LISPRO 2 UNITS: 100 INJECTION, SOLUTION INTRAVENOUS; SUBCUTANEOUS at 12:30

## 2022-02-13 RX ADMIN — PANTOPRAZOLE SODIUM 40 MG: 40 TABLET, DELAYED RELEASE ORAL at 06:13

## 2022-02-13 RX ADMIN — GABAPENTIN 300 MG: 300 CAPSULE ORAL at 09:05

## 2022-02-13 RX ADMIN — SODIUM CHLORIDE, PRESERVATIVE FREE 10 ML: 5 INJECTION INTRAVENOUS at 09:08

## 2022-02-13 RX ADMIN — CEFTRIAXONE 1 G: 10 INJECTION, POWDER, FOR SOLUTION INTRAVENOUS at 16:50

## 2022-02-13 RX ADMIN — INSULIN LISPRO 2 UNITS: 100 INJECTION, SOLUTION INTRAVENOUS; SUBCUTANEOUS at 09:05

## 2022-02-13 RX ADMIN — SERTRALINE 150 MG: 100 TABLET, FILM COATED ORAL at 09:05

## 2022-02-13 RX ADMIN — SODIUM CHLORIDE 100 ML/HR: 9 INJECTION, SOLUTION INTRAVENOUS at 09:09

## 2022-02-13 RX ADMIN — SODIUM CHLORIDE 125 MG: 9 INJECTION, SOLUTION INTRAVENOUS at 16:49

## 2022-02-13 RX ADMIN — SODIUM CHLORIDE, PRESERVATIVE FREE 10 ML: 5 INJECTION INTRAVENOUS at 20:28

## 2022-02-13 RX ADMIN — HYDROCODONE BITARTRATE AND ACETAMINOPHEN 1 TABLET: 7.5; 325 TABLET ORAL at 09:05

## 2022-02-13 RX ADMIN — ATORVASTATIN CALCIUM 40 MG: 40 TABLET, FILM COATED ORAL at 20:28

## 2022-02-13 RX ADMIN — LIDOCAINE 2 PATCH: 50 PATCH TOPICAL at 10:34

## 2022-02-13 RX ADMIN — INSULIN LISPRO 2 UNITS: 100 INJECTION, SOLUTION INTRAVENOUS; SUBCUTANEOUS at 16:49

## 2022-02-13 RX ADMIN — HYDROCODONE BITARTRATE AND ACETAMINOPHEN 1 TABLET: 7.5; 325 TABLET ORAL at 20:28

## 2022-02-13 RX ADMIN — Medication 5 MG: at 20:28

## 2022-02-13 RX ADMIN — HYDROCODONE BITARTRATE AND ACETAMINOPHEN 1 TABLET: 7.5; 325 TABLET ORAL at 01:42

## 2022-02-13 RX ADMIN — SODIUM BICARBONATE 650 MG TABLET 1300 MG: at 10:34

## 2022-02-13 RX ADMIN — CALCIUM GLUCONATE 1 G: 20 INJECTION, SOLUTION INTRAVENOUS at 09:08

## 2022-02-13 RX ADMIN — SODIUM BICARBONATE 650 MG TABLET 1300 MG: at 15:07

## 2022-02-13 RX ADMIN — IOPAMIDOL 100 ML: 755 INJECTION, SOLUTION INTRAVENOUS at 16:03

## 2022-02-13 RX ADMIN — GABAPENTIN 300 MG: 300 CAPSULE ORAL at 12:30

## 2022-02-13 NOTE — CONSULTS
NEPHROLOGY CONSULTATION-----[facility]    [facility]  [phone]  Samanta Watkins MD    Patient Care Team:  Baljit Samaniego MD as PCP - General (Family Medicine)  Roland, MD Montserrat as Consulting Physician (Nephrology)    CC/REASON FOR CONSULTATION: RENAL FAILURE/ELEVATED SERUM  CREATININE    PHYSICIAN REQUESTING CONSULTATION:     History of Present Illness     HPI    Patient is a 66 y.o. WF whom I was asked to see in consultation for evaluation and management of renal failure/elevated serum creatinine. Patient was admitted post fall and found to have Left Subcapsular Renal bleed.   Patient denies prior knowledge of functional kidney disease, proteinuria, or hematuria. Patient reports regular NSAID use in the form of Ibuprofen prior to admission. No known h/o hepatitis, TB, rheumatic fever, jaundice. Patient reports that she does bleed/bruise easily on ASA at home. +IV dye exposure with angiogram. No urinary sx. No edema or fluid retention.  +Compliance with home meds. Was not  on diuretics prior to admission. Was on ACE-I/ARB in the form of Lisinopril prior to admission. No herbal med use.      Review of Systems   Constitutional: Positive for activity change and fatigue. Negative for appetite change, chills, diaphoresis, fever and unexpected weight change.   HENT: Negative for congestion, dental problem, drooling, ear discharge, ear pain, facial swelling, hearing loss, mouth sores, nosebleeds, postnasal drip, rhinorrhea, sinus pressure, sinus pain, sneezing, sore throat, tinnitus, trouble swallowing and voice change.    Eyes: Negative for photophobia, pain, discharge, redness, itching and visual disturbance.   Respiratory: Negative for apnea, cough, choking, chest tightness, shortness of breath, wheezing and stridor.    Cardiovascular: Negative for chest pain, palpitations and leg swelling.   Gastrointestinal: Positive for  abdominal pain. Negative for abdominal distention, anal bleeding, blood in stool, constipation, diarrhea, nausea, rectal pain and vomiting.   Endocrine: Negative for cold intolerance, heat intolerance, polydipsia, polyphagia and polyuria.   Genitourinary: Positive for frequency. Negative for decreased urine volume, difficulty urinating, dysuria, enuresis, flank pain, genital sores, hematuria and urgency.   Musculoskeletal: Positive for arthralgias, back pain and myalgias. Negative for gait problem, joint swelling, neck pain and neck stiffness.   Skin: Negative for color change, pallor, rash and wound.   Allergic/Immunologic: Negative for environmental allergies, food allergies and immunocompromised state.   Neurological: Positive for weakness. Negative for dizziness, tremors, seizures, syncope, facial asymmetry, speech difficulty, light-headedness, numbness and headaches.   Hematological: Negative for adenopathy. Bruises/bleeds easily.   Psychiatric/Behavioral: Negative for agitation, behavioral problems, confusion, decreased concentration, dysphoric mood, hallucinations, self-injury, sleep disturbance and suicidal ideas. The patient is not nervous/anxious and is not hyperactive.           Past Medical History:   Diagnosis Date   • Diabetes mellitus (HCC)    • Hyperlipidemia    • Hypertension    • TIA (transient ischemic attack)        Past Surgical History:   Procedure Laterality Date   • CERVICAL FUSION     •  SECTION         Family History   Problem Relation Age of Onset   • Heart disease Father        Social History     Tobacco Use   • Smoking status: Former Smoker   • Smokeless tobacco: Never Used   Substance Use Topics   • Alcohol use: Yes     Comment: once or twice a year   • Drug use: Never       Home Meds:   Medications Prior to Admission   Medication Sig Dispense Refill Last Dose   • acetaminophen (TYLENOL) 325 MG tablet Take 2 tablets by mouth Every 4 (Four) Hours As Needed for Mild Pain .      •  amLODIPine (NORVASC) 10 MG tablet Take 1 tablet by mouth Daily.   2/10/2022 at Unknown time   • aspirin 325 MG tablet Take 1 tablet by mouth Daily.   2/10/2022 at Unknown time   • atorvastatin (LIPITOR) 40 MG tablet Take 1 tablet by mouth Every Night.   2/10/2022 at Unknown time   • dextrose (GLUTOSE) 40 % gel Take 15 g by mouth Every 15 (Fifteen) Minutes As Needed for Low Blood Sugar (Blood sugar less than 70).      • gabapentin (NEURONTIN) 300 MG capsule Take 300 mg by mouth Daily With Breakfast & Lunch.   2/10/2022 at Unknown time   • gabapentin (NEURONTIN) 300 MG capsule Take 600 mg by mouth Every Night.   2/10/2022 at Unknown time   • glucagon (human recombinant) 1 mg/mL in sterile water (preservative free) injection injection Inject 1 mL under the skin into the appropriate area as directed Every 15 (Fifteen) Minutes As Needed (Blood Glucose Less Than 70).      • insulin glargine (LANTUS, SEMGLEE) 100 UNIT/ML injection Inject 15 Units under the skin into the appropriate area as directed Every 12 (Twelve) Hours.  12 2/11/2022 at Unknown time   • insulin lispro (ADMELOG) 100 UNIT/ML injection Inject 0-24 Units under the skin into the appropriate area as directed 3 (Three) Times a Day Before Meals.  12    • insulin lispro (ADMELOG) 100 UNIT/ML injection Inject 5 Units under the skin into the appropriate area as directed 3 (Three) Times a Day With Meals.  12    • lisinopril (PRINIVIL,ZESTRIL) 20 MG tablet Take 1 tablet by mouth Daily.   2/10/2022 at Unknown time   • melatonin 5 MG tablet tablet Take 1 tablet by mouth At Night As Needed (insomnia).      • metFORMIN ER (GLUCOPHAGE-XR) 500 MG 24 hr tablet Take 1,000 mg by mouth 2 (Two) Times a Day.   2/10/2022 at Unknown time   • sertraline (ZOLOFT) 100 MG tablet Take 150 mg by mouth Daily.   2/10/2022 at Unknown time   • vitamin B-12 (CYANOCOBALAMIN) 1000 MCG tablet Take 1 tablet by mouth Daily.   2/10/2022 at Unknown time       Scheduled Meds:  Acetylcysteine, 1,200  mg, Oral, Q12H  atorvastatin, 40 mg, Oral, Nightly  cefTRIAXone, 1 g, Intravenous, Q24H  gabapentin, 300 mg, Oral, Daily With Breakfast & Lunch  gabapentin, 600 mg, Oral, Nightly  insulin lispro, 0-9 Units, Subcutaneous, TID AC  lidocaine, 2 patch, Transdermal, Q24H  pantoprazole, 40 mg, Oral, Q AM  sertraline, 150 mg, Oral, Daily  sodium bicarbonate, 1,300 mg, Oral, Once  sodium chloride, 10 mL, Intravenous, Q12H        Continuous Infusions:  sodium chloride, 100 mL/hr, Last Rate: 100 mL/hr (02/12/22 1527)        PRN Meds:  dextrose  •  dextrose  •  glucagon (human recombinant)  •  hydrALAZINE  •  HYDROcodone-acetaminophen  •  insulin lispro **AND** insulin lispro  •  melatonin  •  Morphine  •  ondansetron **OR** ondansetron  •  [COMPLETED] Insert peripheral IV **AND** sodium chloride  •  sodium chloride    Allergies:  Patient has no known allergies.    OBJECTIVE    Vital Signs  Temp:  [98.2 °F (36.8 °C)-100.7 °F (38.2 °C)] 98.8 °F (37.1 °C)  Heart Rate:  [85-94] 85  Resp:  [16-18] 16  BP: (100-133)/(56-87) 129/87    No intake/output data recorded.  I/O last 3 completed shifts:  In: 1317.5 [P.O.:960; Blood:357.5]  Out: -     Physical Exam:  General Appearance: alert, appears stated age and cooperative  Head: normocephalic, without obvious abnormality and atraumatic  Eyes: conjunctivae and sclerae normal and no icterus  Neck: supple and no JVD  Lungs: clear to auscultation and respirations regular  Heart: regular rhythm & normal rate and normal S1, S2 +MARCIO  Chest Wall: no abnormalities observed  Abdomen: normal bowel sounds and soft non-tender +OBESITY  Extremities: moves extremities well, no edema, no cyanosis +DJD  Skin: no bleeding, bruising or rash +PALLOR  Neurologic: Alert, and oriented. No focal deficits    Results Review:    I reviewed the patient's new clinical results.    WBC WBC   Date Value Ref Range Status   02/13/2022 11.80 (H) 3.40 - 10.80 10*3/mm3 Final   02/12/2022 12.20 (H) 3.40 - 10.80 10*3/mm3  Final   02/11/2022 15.60 (H) 3.40 - 10.80 10*3/mm3 Final      HGB Hemoglobin   Date Value Ref Range Status   02/13/2022 7.5 (L) 12.0 - 15.9 g/dL Final   02/12/2022 7.7 (L) 12.0 - 15.9 g/dL Final   02/12/2022 8.4 (L) 12.0 - 15.9 g/dL Final   02/12/2022 8.5 (L) 12.0 - 15.9 g/dL Final   02/12/2022 8.6 (L) 12.0 - 15.9 g/dL Final   02/12/2022 8.7 (L) 12.0 - 15.9 g/dL Final   02/12/2022 9.8 (L) 12.0 - 15.9 g/dL Final   02/11/2022 10.6 (L) 12.0 - 15.9 g/dL Final   02/11/2022 11.0 (L) 12.0 - 15.9 g/dL Final   02/11/2022 10.7 (L) 12.0 - 15.9 g/dL Final   02/11/2022 10.7 (L) 12.0 - 15.9 g/dL Final   02/11/2022 11.5 (L) 12.0 - 15.9 g/dL Final      HCT Hematocrit   Date Value Ref Range Status   02/13/2022 22.5 (L) 34.0 - 46.6 % Final   02/12/2022 23.3 (L) 34.0 - 46.6 % Final   02/12/2022 24.8 (L) 34.0 - 46.6 % Final   02/12/2022 24.8 (L) 34.0 - 46.6 % Final   02/12/2022 25.3 (L) 34.0 - 46.6 % Final   02/12/2022 26.0 (L) 34.0 - 46.6 % Final   02/12/2022 29.0 (L) 34.0 - 46.6 % Final   02/11/2022 31.9 (L) 34.0 - 46.6 % Final   02/11/2022 33.1 (L) 34.0 - 46.6 % Final   02/11/2022 31.9 (L) 34.0 - 46.6 % Final   02/11/2022 31.5 (L) 34.0 - 46.6 % Final   02/11/2022 34.4 34.0 - 46.6 % Final      Platlets No results found for: LABPLAT   MCV MCV   Date Value Ref Range Status   02/13/2022 90.5 79.0 - 97.0 fL Final   02/12/2022 90.3 79.0 - 97.0 fL Final   02/11/2022 88.7 79.0 - 97.0 fL Final          Sodium Sodium   Date Value Ref Range Status   02/13/2022 138 136 - 145 mmol/L Final   02/12/2022 136 136 - 145 mmol/L Final   02/11/2022 138 136 - 145 mmol/L Final      Potassium Potassium   Date Value Ref Range Status   02/13/2022 4.7 3.5 - 5.2 mmol/L Final   02/12/2022 5.4 (H) 3.5 - 5.2 mmol/L Final   02/11/2022 4.3 3.5 - 5.2 mmol/L Final      Chloride Chloride   Date Value Ref Range Status   02/13/2022 102 98 - 107 mmol/L Final   02/12/2022 102 98 - 107 mmol/L Final   02/11/2022 103 98 - 107 mmol/L Final      CO2 CO2   Date Value Ref Range  Status   02/13/2022 24.0 22.0 - 29.0 mmol/L Final   02/12/2022 24.0 22.0 - 29.0 mmol/L Final   02/11/2022 21.0 (L) 22.0 - 29.0 mmol/L Final      BUN BUN   Date Value Ref Range Status   02/13/2022 43 (H) 8 - 23 mg/dL Final   02/12/2022 38 (H) 8 - 23 mg/dL Final   02/11/2022 28 (H) 8 - 23 mg/dL Final      Creatinine Creatinine   Date Value Ref Range Status   02/13/2022 1.74 (H) 0.57 - 1.00 mg/dL Final   02/12/2022 1.60 (H) 0.57 - 1.00 mg/dL Final   02/11/2022 0.81 0.57 - 1.00 mg/dL Final      Calcium Calcium   Date Value Ref Range Status   02/13/2022 8.1 (L) 8.6 - 10.5 mg/dL Final   02/12/2022 8.5 (L) 8.6 - 10.5 mg/dL Final   02/11/2022 8.9 8.6 - 10.5 mg/dL Final      PO4 No results found for: CAPO4   Albumin Albumin   Date Value Ref Range Status   02/13/2022 3.30 (L) 3.50 - 5.20 g/dL Final   02/12/2022 3.00 (L) 3.50 - 5.20 g/dL Final   02/11/2022 3.50 3.50 - 5.20 g/dL Final      Magnesium No results found for: MG   Uric Acid No results found for: URICACID       Imaging Results (Last 72 Hours)     Procedure Component Value Units Date/Time    IR Angiogram Renal 1st Order [[id number]] Collected: 02/11/22 1607     Updated: 02/11/22 1617    Narrative:      DATE OF EXAM:  2/11/2022 2:47 PM     PROCEDURE:  IR ANGIOGRAM RENAL 1ST ORDER-     INDICATIONS:  Fall/trauma with large left perinephric or subcapsular hematoma and  evidence of active hemorrhage by CT scan. Interventional radiology  consulted for possible embolization of bleeding left renal artery  branch. W19.XXXA-Unspecified fall, initial encounter; S37.012A-Minor  contusion of left kidney, initial encounter     COMPARISON:  CT scan performed earlier same day.     FLUOROSCOPIC TIME:  3 minutes 9 seconds     PHYSICIAN MONITORED CONSCIOUS SEDATION TIME:  31 minutes     CONTRAST MEDIA:  45 cc Isovue-300     DESCRIPTION OF PROCEDURE:  The patient's medications were reviewed prior to the procedure. The  procedure, risks and alternatives were discussed and informed  written  consent was obtained. Maximal sterile barrier technique was utilized  throughout the procedure. The patient was placed supine on the  fluoroscopy table. The right groin was prepped and draped using maximum  sterile barrier technique. Lidocaine was used for local anesthesia.  Access was obtained of the right common femoral artery under direct  ultrasound visualization using a micropuncture set. A Parantezson wire was  advanced into the abdominal aorta and a 5 Beninese hemostatic sheath  placed. Using a 4 Beninese Ashley's hook catheter, the left renal artery  was selectively catheterized. Multiple left renal angiograms were then  performed in multiple obliquities. No abnormal vessels are demonstrated.  No evidence of pseudoaneurysm or active extravasation is identified.  There is normal parenchymal enhancement of the left kidney. Incidental  note is made of moderate narrowing of the origin of the left renal  artery. This may be due to kinking from the rotational effects of the  large perinephric hematoma, however.     A right femoral angiogram was performed to confirm anatomy appropriate  for use a closure device. The site was reprepped sterilely. Additional  lidocaine was given subcutaneously. Gloves were changed. Hemostasis was  achieved using an Angio-Seal closure device. Proximal and distal pulses  were intact following placement.     The procedure was performed under moderate conscious sedation with  continuous monitoring using Versed and fentanyl. 31 minutes of physician  monitored sedation were provided.       Impression:      Multiple left renal angiograms demonstrate no evidence of continued  active hemorrhage, pseudoaneurysm or abnormal vessel. No embolization  was performed or required.     Electronically Signed By-Juice Willingham MD On:2/11/2022 4:15 PM  This report was finalized on [id number] by  Juice Willingham MD.    CT Chest With Contrast Diagnostic [[id number]] Collected: 02/11/22 1229     Updated:  02/11/22 1236    Narrative:      CT CHEST W CONTRAST DIAGNOSTIC-, CT ABDOMEN PELVIS W CONTRAST-     Date of Exam: 2/11/2022 11:50 AM     Indication: Trauma severe left rib pain left upper quadrant pain.     Comparison: AP portable chest 11/23/2021. No prior abdominal/pelvic  imaging for comparison at this institution.     Technique: Serial and axial CT images of the chest were obtained  following the uneventful intravenous administration of 100 cc Isovue-370  contrast. Reconstructions in the coronal and sagittal planes were also  performed.  Automated exposure control and iterative reconstruction  methods were used.     FINDINGS:     CHEST: No acute osseous abnormalities are seen within the chest. No  displaced rib fracture is identified. There surgical changes of anterior  cervical spine fusion.     Heart size is normal. Mild coronary artery calcifications are present.  No pericardial effusion. No pleural effusion. No pneumothorax. Benign  calcified granuloma is present in the right lower lobe. Benign calcified  lymph node is seen in the right hilum. Minimal dependent atelectasis in  the lungs. No acute airspace disease.           ABDOMEN AND PELVIS: 1.2 x 4.1 x 1.4 cm left renal subcapsular hematoma  is seen. Serpiginous high density material consistent with active IV  contrast extravasation, is seen within the hematoma, consistent with  active bleeding. No discrete, qing laceration defect is identified on  this examination. A small cyst projects anteriorly from the left lower  renal pole measuring 17 x 12 mm.     Right kidney, liver, gallbladder, spleen, pancreas and adrenal glands  are normal. Normal appendix. The bowel does not appear thickened or  inflamed. Urinary bladder, uterus, and rectum are normal. There is mild  left-sided retroperitoneal stranding extending from the level of the  kidney into the pelvis.     No acute osseous abnormality is seen within the abdomen or pelvis.             Impression:       1. Large left renal subcapsular hematoma with signs of active bleeding.  I notified Dr. Martinez in the emergency room prior to the time of this  dictation.  2. No acute findings in the chest or pelvis.     Electronically Signed By-Laura Garcia MD On:2/11/2022 12:34 PM  This report was finalized on [id number] by  Laura Garcia MD.    CT Abdomen Pelvis With Contrast [[id number]] Collected: 02/11/22 1229     Updated: 02/11/22 1236    Narrative:      CT CHEST W CONTRAST DIAGNOSTIC-, CT ABDOMEN PELVIS W CONTRAST-     Date of Exam: 2/11/2022 11:50 AM     Indication: Trauma severe left rib pain left upper quadrant pain.     Comparison: AP portable chest 11/23/2021. No prior abdominal/pelvic  imaging for comparison at this institution.     Technique: Serial and axial CT images of the chest were obtained  following the uneventful intravenous administration of 100 cc Isovue-370  contrast. Reconstructions in the coronal and sagittal planes were also  performed.  Automated exposure control and iterative reconstruction  methods were used.     FINDINGS:     CHEST: No acute osseous abnormalities are seen within the chest. No  displaced rib fracture is identified. There surgical changes of anterior  cervical spine fusion.     Heart size is normal. Mild coronary artery calcifications are present.  No pericardial effusion. No pleural effusion. No pneumothorax. Benign  calcified granuloma is present in the right lower lobe. Benign calcified  lymph node is seen in the right hilum. Minimal dependent atelectasis in  the lungs. No acute airspace disease.           ABDOMEN AND PELVIS: 1.2 x 4.1 x 1.4 cm left renal subcapsular hematoma  is seen. Serpiginous high density material consistent with active IV  contrast extravasation, is seen within the hematoma, consistent with  active bleeding. No discrete, qing laceration defect is identified on  this examination. A small cyst projects anteriorly from the left lower  renal pole measuring  17 x 12 mm.     Right kidney, liver, gallbladder, spleen, pancreas and adrenal glands  are normal. Normal appendix. The bowel does not appear thickened or  inflamed. Urinary bladder, uterus, and rectum are normal. There is mild  left-sided retroperitoneal stranding extending from the level of the  kidney into the pelvis.     No acute osseous abnormality is seen within the abdomen or pelvis.             Impression:      1. Large left renal subcapsular hematoma with signs of active bleeding.  I notified Dr. Martinez in the emergency room prior to the time of this  dictation.  2. No acute findings in the chest or pelvis.     Electronically Signed By-Laura Garcia MD On:2/11/2022 12:34 PM  This report was finalized on [id number] by  Laura Garcia MD.            Results for orders placed during the hospital encounter of 11/23/21    XR Chest 1 View    Narrative  DATE OF EXAM:  11/23/2021 3:34 PM    PROCEDURE:  XR CHEST 1 VW-    INDICATIONS:  Acute Stroke Protocol (onset < 12 hrs); I63.9-Cerebral infarction,  unspecified; E11.65-Type 2 diabetes mellitus with hyperglycemia    COMPARISON:  1/20/2018    TECHNIQUE:  Single radiographic view of the chest was obtained.    FINDINGS:  Cervical fixation hardware again noted. Slight elevation of right  hemidiaphragm. Evaluation of the bases is somewhat limited due to  overlying soft tissue. No definite focal consolidation is seen. Heart  size is within normal limits for technique. Aortic vascular  calcification is present. No pneumothorax identified.    Impression  Low lung volumes. No acute cardiopulmonary findings.    Electronically Signed By-Juice Willingham MD On:11/23/2021 3:52 PM  This report was finalized on [id number] by  Juice Willingham MD.            ASSESSMENT / PLAN      Fall    Kidney hematoma    Diabetes mellitus with coincident hypertension (HCC)    Type 2 diabetes mellitus with hyperlipidemia (HCC)    Diabetes mellitus with polyneuropathy (HCC)    1. RENAL  FAILURE-------Nonoliguric. +ARF/OUMOU with normal baseline renal function. +ARF/OUMOU is secondary to ATN from initial hypotension, anemia with decreased renal perfusion, preadmission ACE-I use and IV dye exposure with angiogram/CT. Trying to avoid further IV dye exposure; however patient in need of a repeat CT today with contrast. Will premedicate with IVFs, Mucomyst, and po bicarb. Off Lisinopril. Patient has been explained and understands risks of repeat IV dye exposure. Patient has also been explained her disease state and how she may need dialysis/renal replacement therapy depending on progression of her renal dysfunction. Check further urine and serum studies. No NSAIDs. Dose meds for CrCl less than 10 cc/min until ARF/OUMOU is resolved    2. DMII------D/C Metformin given ARF/OUMOU    3. HTN WITH CKD-------BP ok now. Avoid hypotension. No ACE-I/ARB/DRI    4. ANEMIA/LEFT SUBCAPSULAR RENAL BLEED------S/P IR Angiogram and was not able to be embolized. More PRBCs today. Give IV iron for DARLEEN    5. DEPRESSION-----On SSRI    6. OBESITY    7. GERD/PUD PROPHYLAXIS------On PPI. Benefits outweigh risks despite OUMOU    8. DM PERIPHERAL NEUROPATHY-----Back down Neurontin given OUMOU    9. OA/DJD------D/C Ibuprofen. Counseled patient on avoidance of NSAIDs. Check uric acid level    10. H/O TIA    I discussed the patients findings and my recommendations with patient, nursing staff, primary care team and consulting provider    Will follow along closely. Thank you for allowing us to see this patient in renal consultation.    [facility]  [phone]  MD Samanta Todd MD  02/13/22  08:27 EST

## 2022-02-13 NOTE — SIGNIFICANT NOTE
02/13/22 1247   OTHER   Discipline physical therapist   Rehab Time/Intention   Session Not Performed unable to evaluate, medical status change  (spoke with Dr King, hold PT this date as pt is still in severe pain and awaiting repeat CT to evaluate renal hematoma)   Recommendation   PT - Next Appointment 02/14/22

## 2022-02-13 NOTE — PROGRESS NOTES
"[facility] Progress Note    Chief Complaint:  No change          Vital Signs  /76 (BP Location: Right arm, Patient Position: Lying)   Pulse 90   Temp 98.5 °F (36.9 °C) (Oral)   Resp 20   Ht 177.8 cm (70\")   Wt (!) 141 kg (311 lb 15.2 oz)   SpO2 94%   BMI 44.76 kg/m²        Results Review:      Lab Results (last 24 hours)     Procedure Component Value Units Date/Time    Eosinophil Smear - Urine, Urine, Clean Catch [[id number]]  (Normal) Collected: 02/13/22 1036    Specimen: Urine, Clean Catch Updated: 02/13/22 1225     Eosinophil Smear 0 % EOS/100 Cells     POC Glucose Once [[id number]]  (Abnormal) Collected: 02/13/22 1144    Specimen: Blood Updated: 02/13/22 1144     Glucose 174 mg/dL      Comment: Serial Number: [id number]:  [id number]       Sodium, Urine, Random - Urine, Clean Catch [[id number]] Collected: 02/13/22 1036    Specimen: Urine, Clean Catch Updated: 02/13/22 1108     Sodium, Urine 33 mmol/L     Narrative:      Reference intervals for random urine have not been established.  Clinical usage is dependent upon physician's interpretation in combination with other laboratory tests.       Urinalysis, Microscopic Only - Urine, Clean Catch [[id number]]  (Abnormal) Collected: 02/13/22 1037    Specimen: Urine, Clean Catch Updated: 02/13/22 1101     RBC, UA 3-5 /HPF      WBC, UA 3-5 /HPF      Bacteria, UA None Seen /HPF      Squamous Epithelial Cells, UA 0-2 /HPF      Hyaline Casts, UA 3-6 /LPF      Methodology Automated Microscopy    Urinalysis With Culture If Indicated - Urine, Clean Catch [[id number]]  (Abnormal) Collected: 02/13/22 1037    Specimen: Urine, Clean Catch Updated: 02/13/22 1101     Color, UA Yellow     Appearance, UA Clear     pH, UA <=5.0     Specific Gravity, UA 1.029     Glucose,  mg/dL (Trace)     Ketones, UA Negative     Bilirubin, UA Negative     Blood, UA Small (1+)     Protein,  mg/dL (2+)     Leuk Esterase, UA Trace     Nitrite, UA Negative     Urobilinogen, UA " 0.2 E.U./dL    Hemoglobin & Hematocrit, Blood [[id number]]  (Abnormal) Collected: 02/13/22 0832    Specimen: Blood Updated: 02/13/22 0916     Hemoglobin 8.6 g/dL      Hematocrit 25.1 %     Iron [[id number]]  (Abnormal) Collected: 02/13/22 0141    Specimen: Blood Updated: 02/13/22 0915     Iron 22 mcg/dL     Uric Acid [[id number]]  (Abnormal) Collected: 02/13/22 0141    Specimen: Blood Updated: 02/13/22 0915     Uric Acid 7.2 mg/dL     CK [[id number]]  (Normal) Collected: 02/13/22 0141    Specimen: Blood Updated: 02/13/22 0915     Creatine Kinase 151 U/L     POC Glucose Once [[id number]]  (Abnormal) Collected: 02/13/22 0714    Specimen: Blood Updated: 02/13/22 0715     Glucose 179 mg/dL      Comment: Serial Number: [id number]:  [id number]       Comprehensive Metabolic Panel [[id number]]  (Abnormal) Collected: 02/13/22 0141    Specimen: Blood Updated: 02/13/22 0252     Glucose 209 mg/dL      BUN 43 mg/dL      Creatinine 1.74 mg/dL      Sodium 138 mmol/L      Potassium 4.7 mmol/L      Chloride 102 mmol/L      CO2 24.0 mmol/L      Calcium 8.1 mg/dL      Total Protein 5.5 g/dL      Albumin 3.30 g/dL      ALT (SGPT) 9 U/L      AST (SGOT) 14 U/L      Alkaline Phosphatase 61 U/L      Total Bilirubin 0.3 mg/dL      eGFR Non African Amer 29 mL/min/1.73      Globulin 2.2 gm/dL      A/G Ratio 1.5 g/dL      BUN/Creatinine Ratio 24.7     Anion Gap 12.0 mmol/L     Narrative:      GFR Normal >60  Chronic Kidney Disease <60  Kidney Failure <15      CBC Auto Differential [[id number]]  (Abnormal) Collected: 02/13/22 0141    Specimen: Blood Updated: 02/13/22 0234     WBC 11.80 10*3/mm3      RBC 2.49 10*6/mm3      Hemoglobin 7.5 g/dL      Hematocrit 22.5 %      MCV 90.5 fL      MCH 30.3 pg      MCHC 33.5 g/dL      RDW 14.0 %      RDW-SD 44.6 fl      MPV 7.7 fL      Platelets 241 10*3/mm3      Neutrophil % 72.4 %      Lymphocyte % 18.7 %      Monocyte % 7.6 %      Eosinophil % 0.9 %      Basophil % 0.4 %      Neutrophils, Absolute 8.60  10*3/mm3      Lymphocytes, Absolute 2.20 10*3/mm3      Monocytes, Absolute 0.90 10*3/mm3      Eosinophils, Absolute 0.10 10*3/mm3      Basophils, Absolute 0.00 10*3/mm3      nRBC 0.0 /100 WBC     POC Glucose Once [[id number]]  (Abnormal) Collected: 02/13/22 0102    Specimen: Blood Updated: 02/13/22 0103     Glucose 183 mg/dL      Comment: Serial Number: [id number]:  [id number]       Hemoglobin & Hematocrit, Blood [[id number]]  (Abnormal) Collected: 02/12/22 2109    Specimen: Blood Updated: 02/12/22 2124     Hemoglobin 7.7 g/dL      Hematocrit 23.3 %     POC Glucose Once [[id number]]  (Abnormal) Collected: 02/12/22 1709    Specimen: Blood Updated: 02/12/22 1710     Glucose 191 mg/dL      Comment: Serial Number: [id number]:  [id number]           Imaging Results (Last 24 Hours)     ** No results found for the last 24 hours. **          Medication Review:   I have personally reviewed    Current Facility-Administered Medications:   •  Acetylcysteine capsule 1,200 mg, 1,200 mg, Oral, BID, Aracelis King MD, 1,200 mg at 02/13/22 0905  •  atorvastatin (LIPITOR) tablet 40 mg, 40 mg, Oral, Nightly, Yesenia Bustamante APRN, 40 mg at 02/12/22 2149  •  cefTRIAXone (ROCEPHIN) in [facility] 1 gram/10ml IV PUSH syringe, 1 g, Intravenous, Q24H, Aracelis King MD, 1 g at 02/12/22 1726  •  dextrose (D50W) (25 g/50 mL) IV injection 25 g, 25 g, Intravenous, Q15 Min PRN, Yesenia Bustamante, APRN  •  dextrose (GLUTOSE) oral gel 15 g, 15 g, Oral, Q15 Min PRN, Yesenia Bustamante, APRN  •  gabapentin (NEURONTIN) capsule 300 mg, 300 mg, Oral, Daily With Breakfast & Lunch, Yesenia Bustamante, APRN, 300 mg at 02/13/22 1230  •  gabapentin (NEURONTIN) capsule 600 mg, 600 mg, Oral, Nightly, Yesenia Bustamante, APRN, 600 mg at 02/12/22 2148  •  glucagon (human recombinant) (GLUCAGEN DIAGNOSTIC) 1 mg in sterile water (preservative free) 1 mL injection, 1 mg, Intramuscular, Q15 Min PRN, Yesenia Bustamante APRN  •  hydrALAZINE (APRESOLINE) injection 10 mg, 10  mg, Intravenous, Q6H PRN, Yesenia Bustamante APRN  •  HYDROcodone-acetaminophen (NORCO) 7.5-325 MG per tablet 1 tablet, 1 tablet, Oral, Q6H PRN, Aracelis King MD, 1 tablet at 02/13/22 0905  •  insulin lispro (ADMELOG) injection 0-9 Units, 0-9 Units, Subcutaneous, TID AC, 2 Units at 02/13/22 1230 **AND** insulin lispro (ADMELOG) injection 0-9 Units, 0-9 Units, Subcutaneous, PRN, Yesenia Bustamante APRN  •  lidocaine (LIDODERM) 5 % 2 patch, 2 patch, Transdermal, Q24H, Aracelis King MD, 2 patch at 02/13/22 1034  •  melatonin tablet 5 mg, 5 mg, Oral, Nightly PRN, Yesenia Bustamante APRN  •  Morphine sulfate (PF) injection 4 mg, 4 mg, Intravenous, Q3H PRN, Aracelis King MD, 4 mg at 02/12/22 1929  •  ondansetron (ZOFRAN) tablet 4 mg, 4 mg, Oral, Q6H PRN **OR** ondansetron (ZOFRAN) injection 4 mg, 4 mg, Intravenous, Q6H PRN, Yesenia Bustamante APRN  •  pantoprazole (PROTONIX) EC tablet 40 mg, 40 mg, Oral, Q AM, Yesenia Bustamante APRN, 40 mg at 02/13/22 0613  •  sertraline (ZOLOFT) tablet 150 mg, 150 mg, Oral, Daily, Yesenia Bustamante APRN, 150 mg at 02/13/22 0905  •  sodium bicarbonate tablet 1,300 mg, 1,300 mg, Oral, Q4H, Montserrat Watkins MD, 1,300 mg at 02/13/22 1034  •  [COMPLETED] Insert peripheral IV, , , Once **AND** sodium chloride 0.9 % flush 10 mL, 10 mL, Intravenous, PRN, Baljit Martinez MD, 10 mL at 02/11/22 2017  •  sodium chloride 0.9 % flush 10 mL, 10 mL, Intravenous, Q12H, Yesenia Bustamante APRN, 10 mL at 02/13/22 0908  •  sodium chloride 0.9 % flush 10 mL, 10 mL, Intravenous, PRN, Yesenia Bustamante APRN  •  sodium chloride 0.9 % infusion, 100 mL/hr, Intravenous, Continuous, Aracelis King MD, Last Rate: 100 mL/hr at 02/13/22 0909, 100 mL/hr at 02/13/22 0909    Allergies:    Patient has no known allergies.    Assessment:    Active Problems:  Patient Active Problem List   Diagnosis   • Cerebrovascular accident (CVA) (HCC)   • Left-sided weakness   • Dizziness   • Type 2 diabetes mellitus with hyperglycemia  (HCC)   • Peripheral neuropathy   • Hyperlipidemia   • Anxiety associated with depression   • Obesity, Class III, BMI 40-49.9 (morbid obesity) (HCC)   • Acute UTI (urinary tract infection)   • Fall   • Kidney hematoma   • Diabetes mellitus with coincident hypertension (HCC)   • Type 2 diabetes mellitus with hyperlipidemia (HCC)   • Diabetes mellitus with polyneuropathy (HCC)           Plan:    Would cont to monitor Hgb  - may need to transfuse as needed  - no surgical intervention  - may need IR       Nehemiah Colón MD    2/13/2022  13:02 EST

## 2022-02-13 NOTE — PROGRESS NOTES
LOS: 1 day   Patient Care Team:  Baljit Samaniego MD as PCP - General (Family Medicine)  Montserrat Watkins MD as Consulting Physician (Nephrology)    Subjective     Interval History: hg trending down, creatinine trending up    Patient Complaints: Left sided abdominal pain and flank pain unchanged    History taken from: patient    Review of Systems   Constitutional: Positive for activity change and appetite change. Negative for diaphoresis, fatigue and fever.   HENT: Negative for facial swelling.    Eyes: Negative for visual disturbance.   Respiratory: Negative for cough, shortness of breath, wheezing and stridor.    Cardiovascular: Negative for chest pain, palpitations and leg swelling.   Gastrointestinal: Positive for abdominal pain. Negative for constipation, diarrhea, nausea and vomiting.   Endocrine: Negative for polyuria.   Genitourinary: Positive for flank pain. Negative for difficulty urinating, enuresis, frequency, hematuria and urgency.   Musculoskeletal: Positive for arthralgias. Negative for back pain.   Skin: Negative for rash and wound.   Neurological: Negative for dizziness, numbness and headaches.   Psychiatric/Behavioral: Negative for confusion.           Objective     Vital Signs  Temp:  [98.2 °F (36.8 °C)-100.7 °F (38.2 °C)] 98.4 °F (36.9 °C)  Heart Rate:  [85-94] 90  Resp:  [16-18] 18  BP: (100-142)/(56-87) 142/74    Physical Exam:     General Appearance:    Alert, cooperative, in no acute distress, BMI 44   Head:    Normocephalic, without obvious abnormality, atraumatic   Eyes:            Lids and lashes normal, conjunctivae and sclerae normal, no   icterus, no pallor, corneas clear, PERRLA   Ears:    Ears appear intact with no abnormalities noted   Throat:   No oral lesions, no thrush, oral mucosa moist   Neck:   No adenopathy, supple, trachea midline, no thyromegaly, no   carotid bruit, no JVD   Lungs:     Clear to auscultation,respirations regular, even and                   unlabored    Heart:    Regular rhythm and normal rate, normal S1 and S2, no            murmur, no gallop, no rub, no click   Chest Wall:    No abnormalities observed   Abdomen:     Tender LUQ, LLQ, left flank; no rebound/guarding   Extremities:   Moves all extremities well, no edema, no cyanosis, no             Redness   Pulses:   Pulses palpable and equal bilaterally   Skin:   No bleeding, bruising or rash   Lymph nodes:   No palpable adenopathy   Neurologic:   Mild LUE and LLE weakness (chronic, residual from stroke in 11/2021.        Results Review:    Lab Results (last 24 hours)     Procedure Component Value Units Date/Time    Hemoglobin & Hematocrit, Blood [[id number]]  (Abnormal) Collected: 02/13/22 0832    Specimen: Blood Updated: 02/13/22 0916     Hemoglobin 8.6 g/dL      Hematocrit 25.1 %     Iron [[id number]]  (Abnormal) Collected: 02/13/22 0141    Specimen: Blood Updated: 02/13/22 0915     Iron 22 mcg/dL     Uric Acid [[id number]]  (Abnormal) Collected: 02/13/22 0141    Specimen: Blood Updated: 02/13/22 0915     Uric Acid 7.2 mg/dL     CK [[id number]]  (Normal) Collected: 02/13/22 0141    Specimen: Blood Updated: 02/13/22 0915     Creatine Kinase 151 U/L     POC Glucose Once [[id number]]  (Abnormal) Collected: 02/13/22 0714    Specimen: Blood Updated: 02/13/22 0715     Glucose 179 mg/dL      Comment: Serial Number: [id number]:  [id number]       Comprehensive Metabolic Panel [[id number]]  (Abnormal) Collected: 02/13/22 0141    Specimen: Blood Updated: 02/13/22 0252     Glucose 209 mg/dL      BUN 43 mg/dL      Creatinine 1.74 mg/dL      Sodium 138 mmol/L      Potassium 4.7 mmol/L      Chloride 102 mmol/L      CO2 24.0 mmol/L      Calcium 8.1 mg/dL      Total Protein 5.5 g/dL      Albumin 3.30 g/dL      ALT (SGPT) 9 U/L      AST (SGOT) 14 U/L      Alkaline Phosphatase 61 U/L      Total Bilirubin 0.3 mg/dL      eGFR Non African Amer 29 mL/min/1.73      Globulin 2.2 gm/dL      A/G Ratio 1.5 g/dL       BUN/Creatinine Ratio 24.7     Anion Gap 12.0 mmol/L     Narrative:      GFR Normal >60  Chronic Kidney Disease <60  Kidney Failure <15      CBC Auto Differential [[id number]]  (Abnormal) Collected: 02/13/22 0141    Specimen: Blood Updated: 02/13/22 0234     WBC 11.80 10*3/mm3      RBC 2.49 10*6/mm3      Hemoglobin 7.5 g/dL      Hematocrit 22.5 %      MCV 90.5 fL      MCH 30.3 pg      MCHC 33.5 g/dL      RDW 14.0 %      RDW-SD 44.6 fl      MPV 7.7 fL      Platelets 241 10*3/mm3      Neutrophil % 72.4 %      Lymphocyte % 18.7 %      Monocyte % 7.6 %      Eosinophil % 0.9 %      Basophil % 0.4 %      Neutrophils, Absolute 8.60 10*3/mm3      Lymphocytes, Absolute 2.20 10*3/mm3      Monocytes, Absolute 0.90 10*3/mm3      Eosinophils, Absolute 0.10 10*3/mm3      Basophils, Absolute 0.00 10*3/mm3      nRBC 0.0 /100 WBC     POC Glucose Once [[id number]]  (Abnormal) Collected: 02/13/22 0102    Specimen: Blood Updated: 02/13/22 0103     Glucose 183 mg/dL      Comment: Serial Number: [id number]:  [id number]       Hemoglobin & Hematocrit, Blood [[id number]]  (Abnormal) Collected: 02/12/22 2109    Specimen: Blood Updated: 02/12/22 2124     Hemoglobin 7.7 g/dL      Hematocrit 23.3 %     POC Glucose Once [[id number]]  (Abnormal) Collected: 02/12/22 1709    Specimen: Blood Updated: 02/12/22 1710     Glucose 191 mg/dL      Comment: Serial Number: [id number]:  [id number]       Hemoglobin & Hematocrit, Blood [[id number]]  (Abnormal) Collected: 02/12/22 1228    Specimen: Blood Updated: 02/12/22 1247     Hemoglobin 8.4 g/dL      Hematocrit 24.8 %     POC Glucose Once [[id number]]  (Abnormal) Collected: 02/12/22 1136    Specimen: Blood Updated: 02/12/22 1136     Glucose 159 mg/dL      Comment: Serial Number: [id number]:  [id number]       Hemoglobin & Hematocrit, Blood [[id number]]  (Abnormal) Collected: 02/12/22 1019    Specimen: Blood Updated: 02/12/22 1030     Hemoglobin 8.5 g/dL      Hematocrit 24.8 %            Imaging  Results (Last 24 Hours)     ** No results found for the last 24 hours. **               I reviewed the patient's new clinical results.    Medication Review:   Scheduled Meds:Acetylcysteine, 1,200 mg, Oral, BID  atorvastatin, 40 mg, Oral, Nightly  cefTRIAXone, 1 g, Intravenous, Q24H  gabapentin, 300 mg, Oral, Daily With Breakfast & Lunch  gabapentin, 600 mg, Oral, Nightly  insulin lispro, 0-9 Units, Subcutaneous, TID AC  lidocaine, 2 patch, Transdermal, Q24H  pantoprazole, 40 mg, Oral, Q AM  sertraline, 150 mg, Oral, Daily  sodium bicarbonate, 1,300 mg, Oral, Q4H  sodium chloride, 10 mL, Intravenous, Q12H      Continuous Infusions:sodium chloride, 100 mL/hr, Last Rate: 100 mL/hr (02/13/22 0909)      PRN Meds:.dextrose  •  dextrose  •  glucagon (human recombinant)  •  hydrALAZINE  •  HYDROcodone-acetaminophen  •  insulin lispro **AND** insulin lispro  •  melatonin  •  Morphine  •  ondansetron **OR** ondansetron  •  [COMPLETED] Insert peripheral IV **AND** sodium chloride  •  sodium chloride     Assessment/Plan       Fall    Kidney hematoma    Diabetes mellitus with coincident hypertension (HCC)    Type 2 diabetes mellitus with hyperlipidemia (HCC)    Diabetes mellitus with polyneuropathy (HCC)  Anemia - related to hematoma; transfused one unit early this am; continue serial h/h.  OUMOU - GFR was 97 at last admission; change may be related to ATN (marked fluctuation in BP from 200/100 to 94/58 after admission) or obstruction related to hematoma - need to re-image due to continued drop in Hg - premedicating for contrasted study with Mucomyst and sodium bicarb.  Dr. Watkins consulted.  Low grade fever - on Rocephin prophylactically for UTI/pyelo in setting of significant renal hematoma    SCd's for dvt prophy  Pantoprazole for stress ulcer prophy    Plan for disposition:TBD - PT eval on hold    Aracelis King MD  02/13/22  10:06 EST

## 2022-02-13 NOTE — PLAN OF CARE
Goal Outcome Evaluation:  Plan of Care Reviewed With: patient        Progress: no change  Outcome Summary: Pt pleasant and cooperative, pt pain controlled with prn pain medication. Pt has right groin which is clean, dry and intact. Pt had CT of abdomen and pelvis. Will continue to monitor and document as needed.

## 2022-02-13 NOTE — NURSING NOTE
Admisson temp 100.7. oral , was taken while pt was drinking a cup of hot coffee , could be reason for elevated temp

## 2022-02-13 NOTE — PLAN OF CARE
Problem: Adult Inpatient Plan of Care  Goal: Absence of Hospital-Acquired Illness or Injury  Intervention: Identify and Manage Fall Risk  Recent Flowsheet Documentation  Taken 2/13/2022 0100 by Shahzad Pitts LPN  Safety Promotion/Fall Prevention:   safety round/check completed   room organization consistent   nonskid shoes/slippers when out of bed   muscle strengthening facilitated   mobility aid in reach   lighting adjusted   fall prevention program maintained   clutter free environment maintained   activity supervised   assistive device/personal items within reach  Intervention: Prevent Skin Injury  Recent Flowsheet Documentation  Taken 2/13/2022 0100 by Shahzad Pitts LPN  Body Position:   position changed independently   position maintained  Intervention: Prevent Infection  Recent Flowsheet Documentation  Taken 2/13/2022 0100 by Shahzad Pitts LPN  Infection Prevention:   visitors restricted/screened   single patient room provided   rest/sleep promoted   personal protective equipment utilized   hand hygiene promoted   equipment surfaces disinfected     Problem: Fall Injury Risk  Goal: Absence of Fall and Fall-Related Injury  Intervention: Identify and Manage Contributors to Fall Injury Risk  Recent Flowsheet Documentation  Taken 2/13/2022 0100 by Shahzad Pitts LPN  Medication Review/Management: medications reviewed  Intervention: Promote Injury-Free Environment  Recent Flowsheet Documentation  Taken 2/13/2022 0100 by Shahzad Pitts LPN  Safety Promotion/Fall Prevention:   safety round/check completed   room organization consistent   nonskid shoes/slippers when out of bed   muscle strengthening facilitated   mobility aid in reach   lighting adjusted   fall prevention program maintained   clutter free environment maintained   activity supervised   assistive device/personal items within reach     Problem: Pain Acute  Goal: Optimal Pain Control  Intervention: Develop Pain Management  Plan  Recent Flowsheet Documentation  Taken 2/13/2022 0212 by Shahzad Pitts LPN  Pain Management Interventions:   see MAR   quiet environment facilitated   position adjusted   pain management plan reviewed with patient/caregiver  Taken 2/13/2022 0142 by Shahzad Pitts LPN  Pain Management Interventions:   see MAR   quiet environment facilitated   position adjusted   pillow support provided   pain management plan reviewed with patient/caregiver     Problem: Skin Injury Risk Increased  Goal: Skin Health and Integrity  Intervention: Optimize Skin Protection  Recent Flowsheet Documentation  Taken 2/13/2022 0100 by Shahzad Pitts LPN  Head of Bed (HOB): HOB at 20-30 degrees   Goal Outcome Evaluation:

## 2022-02-14 ENCOUNTER — READMISSION MANAGEMENT (OUTPATIENT)
Dept: CALL CENTER | Facility: HOSPITAL | Age: 67
End: 2022-02-14

## 2022-02-14 VITALS
BODY MASS INDEX: 41.95 KG/M2 | OXYGEN SATURATION: 92 % | DIASTOLIC BLOOD PRESSURE: 84 MMHG | HEIGHT: 70 IN | HEART RATE: 83 BPM | WEIGHT: 293 LBS | RESPIRATION RATE: 18 BRPM | SYSTOLIC BLOOD PRESSURE: 157 MMHG | TEMPERATURE: 97.8 F

## 2022-02-14 PROBLEM — N17.9 ACUTE KIDNEY INJURY: Status: ACTIVE | Noted: 2022-02-14

## 2022-02-14 PROBLEM — D62 ACUTE BLOOD LOSS ANEMIA: Status: ACTIVE | Noted: 2022-02-14

## 2022-02-14 LAB
ALBUMIN SERPL-MCNC: 3.3 G/DL (ref 3.5–5.2)
ALBUMIN/GLOB SERPL: 1.3 G/DL
ALP SERPL-CCNC: 72 U/L (ref 39–117)
ALT SERPL W P-5'-P-CCNC: 9 U/L (ref 1–33)
ANION GAP SERPL CALCULATED.3IONS-SCNC: 10 MMOL/L (ref 5–15)
AST SERPL-CCNC: 12 U/L (ref 1–32)
BASOPHILS # BLD AUTO: 0 10*3/MM3 (ref 0–0.2)
BASOPHILS NFR BLD AUTO: 0.5 % (ref 0–1.5)
BH BB BLOOD EXPIRATION DATE: NORMAL
BH BB BLOOD TYPE BARCODE: 5100
BH BB DISPENSE STATUS: NORMAL
BH BB PRODUCT CODE: NORMAL
BH BB UNIT NUMBER: NORMAL
BILIRUB SERPL-MCNC: 0.4 MG/DL (ref 0–1.2)
BUN SERPL-MCNC: 34 MG/DL (ref 8–23)
BUN/CREAT SERPL: 39.1 (ref 7–25)
CA-I SERPL ISE-MCNC: 1.19 MMOL/L (ref 1.2–1.3)
CALCIUM SPEC-SCNC: 8.7 MG/DL (ref 8.6–10.5)
CHLORIDE SERPL-SCNC: 102 MMOL/L (ref 98–107)
CO2 SERPL-SCNC: 23 MMOL/L (ref 22–29)
CREAT SERPL-MCNC: 0.87 MG/DL (ref 0.57–1)
CROSSMATCH INTERPRETATION: NORMAL
DEPRECATED RDW RBC AUTO: 42.4 FL (ref 37–54)
EOSINOPHIL # BLD AUTO: 0.2 10*3/MM3 (ref 0–0.4)
EOSINOPHIL NFR BLD AUTO: 1.7 % (ref 0.3–6.2)
ERYTHROCYTE [DISTWIDTH] IN BLOOD BY AUTOMATED COUNT: 13.5 % (ref 12.3–15.4)
GFR SERPL CREATININE-BSD FRML MDRD: 66 ML/MIN/1.73
GLOBULIN UR ELPH-MCNC: 2.5 GM/DL
GLUCOSE BLDC GLUCOMTR-MCNC: 181 MG/DL (ref 70–105)
GLUCOSE BLDC GLUCOMTR-MCNC: 200 MG/DL (ref 70–105)
GLUCOSE SERPL-MCNC: 149 MG/DL (ref 65–99)
HCT VFR BLD AUTO: 23.6 % (ref 34–46.6)
HCT VFR BLD AUTO: 25.4 % (ref 34–46.6)
HCT VFR BLD AUTO: 25.7 % (ref 34–46.6)
HGB BLD-MCNC: 8 G/DL (ref 12–15.9)
HGB BLD-MCNC: 8.8 G/DL (ref 12–15.9)
HGB BLD-MCNC: 9.1 G/DL (ref 12–15.9)
LYMPHOCYTES # BLD AUTO: 1.3 10*3/MM3 (ref 0.7–3.1)
LYMPHOCYTES NFR BLD AUTO: 14.2 % (ref 19.6–45.3)
MAGNESIUM SERPL-MCNC: 2.1 MG/DL (ref 1.6–2.4)
MCH RBC QN AUTO: 31.3 PG (ref 26.6–33)
MCHC RBC AUTO-ENTMCNC: 34.6 G/DL (ref 31.5–35.7)
MCV RBC AUTO: 90.5 FL (ref 79–97)
MONOCYTES # BLD AUTO: 0.7 10*3/MM3 (ref 0.1–0.9)
MONOCYTES NFR BLD AUTO: 8.5 % (ref 5–12)
NEUTROPHILS NFR BLD AUTO: 6.6 10*3/MM3 (ref 1.7–7)
NEUTROPHILS NFR BLD AUTO: 75.1 % (ref 42.7–76)
NRBC BLD AUTO-RTO: 0 /100 WBC (ref 0–0.2)
PHOSPHATE SERPL-MCNC: 3.3 MG/DL (ref 2.5–4.5)
PLATELET # BLD AUTO: 206 10*3/MM3 (ref 140–450)
PMV BLD AUTO: 7.8 FL (ref 6–12)
POTASSIUM SERPL-SCNC: 4.5 MMOL/L (ref 3.5–5.2)
PROT SERPL-MCNC: 5.8 G/DL (ref 6–8.5)
RBC # BLD AUTO: 2.81 10*6/MM3 (ref 3.77–5.28)
SODIUM SERPL-SCNC: 135 MMOL/L (ref 136–145)
UNIT  ABO: NORMAL
UNIT  RH: NORMAL
WBC NRBC COR # BLD: 8.8 10*3/MM3 (ref 3.4–10.8)

## 2022-02-14 PROCEDURE — 36415 COLL VENOUS BLD VENIPUNCTURE: CPT | Performed by: INTERNAL MEDICINE

## 2022-02-14 PROCEDURE — 80053 COMPREHEN METABOLIC PANEL: CPT | Performed by: NURSE PRACTITIONER

## 2022-02-14 PROCEDURE — 85018 HEMOGLOBIN: CPT | Performed by: INTERNAL MEDICINE

## 2022-02-14 PROCEDURE — 85014 HEMATOCRIT: CPT | Performed by: INTERNAL MEDICINE

## 2022-02-14 PROCEDURE — 85025 COMPLETE CBC W/AUTO DIFF WBC: CPT | Performed by: NURSE PRACTITIONER

## 2022-02-14 PROCEDURE — 63710000001 ONDANSETRON PER 8 MG: Performed by: NURSE PRACTITIONER

## 2022-02-14 PROCEDURE — 63710000001 INSULIN LISPRO (HUMAN) PER 5 UNITS: Performed by: NURSE PRACTITIONER

## 2022-02-14 PROCEDURE — 25010000002 CALCIUM GLUCONATE-NACL 1-0.675 GM/50ML-% SOLUTION: Performed by: INTERNAL MEDICINE

## 2022-02-14 PROCEDURE — 84100 ASSAY OF PHOSPHORUS: CPT | Performed by: INTERNAL MEDICINE

## 2022-02-14 PROCEDURE — 25010000002 HYDRALAZINE PER 20 MG: Performed by: NURSE PRACTITIONER

## 2022-02-14 PROCEDURE — 97530 THERAPEUTIC ACTIVITIES: CPT

## 2022-02-14 PROCEDURE — 83735 ASSAY OF MAGNESIUM: CPT | Performed by: INTERNAL MEDICINE

## 2022-02-14 PROCEDURE — 97162 PT EVAL MOD COMPLEX 30 MIN: CPT

## 2022-02-14 PROCEDURE — 82962 GLUCOSE BLOOD TEST: CPT

## 2022-02-14 PROCEDURE — 82330 ASSAY OF CALCIUM: CPT | Performed by: INTERNAL MEDICINE

## 2022-02-14 RX ORDER — ONDANSETRON 4 MG/1
4 TABLET, FILM COATED ORAL EVERY 6 HOURS PRN
Qty: 30 TABLET | Refills: 0 | Status: SHIPPED | OUTPATIENT
Start: 2022-02-14

## 2022-02-14 RX ORDER — INSULIN LISPRO 100 [IU]/ML
0-9 INJECTION, SOLUTION INTRAVENOUS; SUBCUTANEOUS AS NEEDED
Refills: 12
Start: 2022-02-14

## 2022-02-14 RX ORDER — FERROUS SULFATE TAB EC 324 MG (65 MG FE EQUIVALENT) 324 (65 FE) MG
324 TABLET DELAYED RESPONSE ORAL
Qty: 30 TABLET | Refills: 0 | Status: SHIPPED | OUTPATIENT
Start: 2022-02-14

## 2022-02-14 RX ORDER — CALCIUM GLUCONATE 20 MG/ML
1 INJECTION, SOLUTION INTRAVENOUS ONCE
Status: COMPLETED | OUTPATIENT
Start: 2022-02-14 | End: 2022-02-14

## 2022-02-14 RX ORDER — POLYETHYLENE GLYCOL 3350 17 G/17G
17 POWDER, FOR SOLUTION ORAL DAILY
Status: DISCONTINUED | OUTPATIENT
Start: 2022-02-14 | End: 2022-02-14 | Stop reason: HOSPADM

## 2022-02-14 RX ORDER — INSULIN LISPRO 100 [IU]/ML
0-9 INJECTION, SOLUTION INTRAVENOUS; SUBCUTANEOUS
Refills: 12
Start: 2022-02-14

## 2022-02-14 RX ORDER — AMLODIPINE BESYLATE 5 MG/1
10 TABLET ORAL ONCE
Status: COMPLETED | OUTPATIENT
Start: 2022-02-14 | End: 2022-02-14

## 2022-02-14 RX ORDER — HYDROCODONE BITARTRATE AND ACETAMINOPHEN 7.5; 325 MG/1; MG/1
1 TABLET ORAL EVERY 6 HOURS PRN
Qty: 40 TABLET | Refills: 0 | Status: SHIPPED | OUTPATIENT
Start: 2022-02-14

## 2022-02-14 RX ORDER — POLYETHYLENE GLYCOL 3350 17 G/17G
17 POWDER, FOR SOLUTION ORAL DAILY
Qty: 30 EACH | Refills: 3 | Status: SHIPPED | OUTPATIENT
Start: 2022-02-14

## 2022-02-14 RX ORDER — LIDOCAINE 50 MG/G
2 PATCH TOPICAL
Qty: 60 EACH | Refills: 0 | Status: SHIPPED | OUTPATIENT
Start: 2022-02-15

## 2022-02-14 RX ORDER — CLONIDINE HYDROCHLORIDE 0.1 MG/1
0.1 TABLET ORAL EVERY 6 HOURS PRN
Status: DISCONTINUED | OUTPATIENT
Start: 2022-02-14 | End: 2022-02-14 | Stop reason: HOSPADM

## 2022-02-14 RX ADMIN — AMLODIPINE BESYLATE 10 MG: 5 TABLET ORAL at 13:28

## 2022-02-14 RX ADMIN — CALCIUM GLUCONATE 1 G: 20 INJECTION, SOLUTION INTRAVENOUS at 08:54

## 2022-02-14 RX ADMIN — SODIUM CHLORIDE, PRESERVATIVE FREE 10 ML: 5 INJECTION INTRAVENOUS at 08:54

## 2022-02-14 RX ADMIN — ONDANSETRON HYDROCHLORIDE 4 MG: 4 TABLET, FILM COATED ORAL at 11:26

## 2022-02-14 RX ADMIN — GABAPENTIN 300 MG: 300 CAPSULE ORAL at 08:54

## 2022-02-14 RX ADMIN — SERTRALINE 150 MG: 100 TABLET, FILM COATED ORAL at 08:54

## 2022-02-14 RX ADMIN — INSULIN LISPRO 4 UNITS: 100 INJECTION, SOLUTION INTRAVENOUS; SUBCUTANEOUS at 08:55

## 2022-02-14 RX ADMIN — CLONIDINE HYDROCHLORIDE 0.1 MG: 0.1 TABLET ORAL at 08:54

## 2022-02-14 RX ADMIN — LIDOCAINE 2 PATCH: 50 PATCH TOPICAL at 11:26

## 2022-02-14 RX ADMIN — GABAPENTIN 300 MG: 300 CAPSULE ORAL at 11:26

## 2022-02-14 RX ADMIN — Medication 1200 MG: at 08:54

## 2022-02-14 RX ADMIN — HYDROCODONE BITARTRATE AND ACETAMINOPHEN 1 TABLET: 7.5; 325 TABLET ORAL at 04:33

## 2022-02-14 RX ADMIN — INSULIN LISPRO 2 UNITS: 100 INJECTION, SOLUTION INTRAVENOUS; SUBCUTANEOUS at 11:26

## 2022-02-14 RX ADMIN — PANTOPRAZOLE SODIUM 40 MG: 40 TABLET, DELAYED RELEASE ORAL at 04:32

## 2022-02-14 RX ADMIN — SODIUM CHLORIDE 75 ML/HR: 9 INJECTION, SOLUTION INTRAVENOUS at 02:48

## 2022-02-14 RX ADMIN — HYDRALAZINE HYDROCHLORIDE 10 MG: 20 INJECTION INTRAMUSCULAR; INTRAVENOUS at 04:33

## 2022-02-14 NOTE — PROGRESS NOTES
"NEPHROLOGY PROGRESS NOTE------[facility]    [facility]  [phone]  Samanta Watkins MD      Patient Care Team:  Baljit Samaniego MD as PCP - General (Family Medicine)  Montserrat Watkins MD as Consulting Physician (Nephrology)      Provider:  Samanta Watkins MD  Patient Name: Medina Ann  :  1955    SUBJECTIVE:    F/U ARF/OUMOU    Feeling better today. No SOB, CP, dysuria. No gross hematuria. Still with some pleuritic pain on left side    Medication:  Acetylcysteine, 1,200 mg, Oral, BID  atorvastatin, 40 mg, Oral, Nightly  cefTRIAXone, 1 g, Intravenous, Q24H  ferric gluconate, 125 mg, Intravenous, Daily  gabapentin, 300 mg, Oral, Daily With Breakfast & Lunch  gabapentin, 300 mg, Oral, Nightly  insulin lispro, 0-9 Units, Subcutaneous, TID AC  lidocaine, 2 patch, Transdermal, Q24H  pantoprazole, 40 mg, Oral, Q AM  sertraline, 150 mg, Oral, Daily  sodium chloride, 10 mL, Intravenous, Q12H      sodium chloride, 75 mL/hr, Last Rate: 75 mL/hr (22 0248)        OBJECTIVE    Vital Sign Min/Max for last 24 hours  Temp  Min: 98.2 °F (36.8 °C)  Max: 99.2 °F (37.3 °C)   BP  Min: 116/62  Max: 190/89   Pulse  Min: 84  Max: 92   Resp  Min: 18  Max: 20   SpO2  Min: 90 %  Max: 94 %   No data recorded   No data recorded     Flowsheet Rows      First Filed Value   Admission Height 177.8 cm (70\") Documented at 2022 09   Admission Weight 141 kg (310 lb) Documented at 2022 0926          No intake/output data recorded.  I/O last 3 completed shifts:  In: 4037.5 [P.O.:1680; I.V.:2000; Blood:357.5]  Out: 700 [Urine:700]    Physical Exam:  General Appearance: alert, appears stated age and cooperative  Head: normocephalic, without obvious abnormality and atraumatic  Eyes: conjunctivae and sclerae normal and no icterus  Neck: supple and no JVD  Lungs: clear to auscultation and respirations regular  Heart: regular rhythm & normal rate and " normal S1, S2 +MARCIO  Chest: Wall no abnormalities observed  Abdomen: normal bowel sounds and soft non-tender +OBESITY  Extremities: moves extremities well, no edema, no cyanosis and no redness  Skin: no bleeding, bruising or rash, turgor normal, color normal and no lesions noted  Neurologic: Alert, and oriented. No focal deficits    Labs:    WBC WBC   Date Value Ref Range Status   02/14/2022 8.80 3.40 - 10.80 10*3/mm3 Final   02/13/2022 11.80 (H) 3.40 - 10.80 10*3/mm3 Final   02/12/2022 12.20 (H) 3.40 - 10.80 10*3/mm3 Final   02/11/2022 15.60 (H) 3.40 - 10.80 10*3/mm3 Final      HGB Hemoglobin   Date Value Ref Range Status   02/14/2022 8.8 (L) 12.0 - 15.9 g/dL Final   02/14/2022 8.0 (L) 12.0 - 15.9 g/dL Final   02/13/2022 8.5 (L) 12.0 - 15.9 g/dL Final   02/13/2022 8.8 (L) 12.0 - 15.9 g/dL Final   02/13/2022 8.2 (L) 12.0 - 15.9 g/dL Final   02/13/2022 8.6 (L) 12.0 - 15.9 g/dL Final   02/13/2022 7.5 (L) 12.0 - 15.9 g/dL Final   02/12/2022 7.7 (L) 12.0 - 15.9 g/dL Final   02/12/2022 8.4 (L) 12.0 - 15.9 g/dL Final   02/12/2022 8.5 (L) 12.0 - 15.9 g/dL Final   02/12/2022 8.6 (L) 12.0 - 15.9 g/dL Final   02/12/2022 8.7 (L) 12.0 - 15.9 g/dL Final   02/12/2022 9.8 (L) 12.0 - 15.9 g/dL Final   02/11/2022 10.6 (L) 12.0 - 15.9 g/dL Final   02/11/2022 11.0 (L) 12.0 - 15.9 g/dL Final   02/11/2022 10.7 (L) 12.0 - 15.9 g/dL Final   02/11/2022 10.7 (L) 12.0 - 15.9 g/dL Final   02/11/2022 11.5 (L) 12.0 - 15.9 g/dL Final      HCT Hematocrit   Date Value Ref Range Status   02/14/2022 25.4 (L) 34.0 - 46.6 % Final   02/14/2022 23.6 (L) 34.0 - 46.6 % Final   02/13/2022 24.7 (L) 34.0 - 46.6 % Final   02/13/2022 26.0 (L) 34.0 - 46.6 % Final   02/13/2022 23.8 (L) 34.0 - 46.6 % Final   02/13/2022 25.1 (L) 34.0 - 46.6 % Final   02/13/2022 22.5 (L) 34.0 - 46.6 % Final   02/12/2022 23.3 (L) 34.0 - 46.6 % Final   02/12/2022 24.8 (L) 34.0 - 46.6 % Final   02/12/2022 24.8 (L) 34.0 - 46.6 % Final   02/12/2022 25.3 (L) 34.0 - 46.6 % Final    02/12/2022 26.0 (L) 34.0 - 46.6 % Final   02/12/2022 29.0 (L) 34.0 - 46.6 % Final   02/11/2022 31.9 (L) 34.0 - 46.6 % Final   02/11/2022 33.1 (L) 34.0 - 46.6 % Final   02/11/2022 31.9 (L) 34.0 - 46.6 % Final   02/11/2022 31.5 (L) 34.0 - 46.6 % Final   02/11/2022 34.4 34.0 - 46.6 % Final      Platlets No results found for: LABPLAT   MCV MCV   Date Value Ref Range Status   02/14/2022 90.5 79.0 - 97.0 fL Final   02/13/2022 90.5 79.0 - 97.0 fL Final   02/12/2022 90.3 79.0 - 97.0 fL Final   02/11/2022 88.7 79.0 - 97.0 fL Final          Sodium Sodium   Date Value Ref Range Status   02/14/2022 135 (L) 136 - 145 mmol/L Final   02/13/2022 138 136 - 145 mmol/L Final   02/12/2022 136 136 - 145 mmol/L Final   02/11/2022 138 136 - 145 mmol/L Final      Potassium Potassium   Date Value Ref Range Status   02/14/2022 4.5 3.5 - 5.2 mmol/L Final   02/13/2022 4.7 3.5 - 5.2 mmol/L Final   02/12/2022 5.4 (H) 3.5 - 5.2 mmol/L Final   02/11/2022 4.3 3.5 - 5.2 mmol/L Final      Chloride Chloride   Date Value Ref Range Status   02/14/2022 102 98 - 107 mmol/L Final   02/13/2022 102 98 - 107 mmol/L Final   02/12/2022 102 98 - 107 mmol/L Final   02/11/2022 103 98 - 107 mmol/L Final      CO2 CO2   Date Value Ref Range Status   02/14/2022 23.0 22.0 - 29.0 mmol/L Final   02/13/2022 24.0 22.0 - 29.0 mmol/L Final   02/12/2022 24.0 22.0 - 29.0 mmol/L Final   02/11/2022 21.0 (L) 22.0 - 29.0 mmol/L Final      BUN BUN   Date Value Ref Range Status   02/14/2022 34 (H) 8 - 23 mg/dL Final   02/13/2022 43 (H) 8 - 23 mg/dL Final   02/12/2022 38 (H) 8 - 23 mg/dL Final   02/11/2022 28 (H) 8 - 23 mg/dL Final      Creatinine Creatinine   Date Value Ref Range Status   02/14/2022 0.87 0.57 - 1.00 mg/dL Final   02/13/2022 1.74 (H) 0.57 - 1.00 mg/dL Final   02/12/2022 1.60 (H) 0.57 - 1.00 mg/dL Final   02/11/2022 0.81 0.57 - 1.00 mg/dL Final      Calcium Calcium   Date Value Ref Range Status   02/14/2022 8.7 8.6 - 10.5 mg/dL Final   02/13/2022 8.1 (L) 8.6 - 10.5  mg/dL Final   02/12/2022 8.5 (L) 8.6 - 10.5 mg/dL Final   02/11/2022 8.9 8.6 - 10.5 mg/dL Final      PO4 No components found for: PO4   Albumin Albumin   Date Value Ref Range Status   02/14/2022 3.30 (L) 3.50 - 5.20 g/dL Final   02/13/2022 3.30 (L) 3.50 - 5.20 g/dL Final   02/12/2022 3.00 (L) 3.50 - 5.20 g/dL Final   02/11/2022 3.50 3.50 - 5.20 g/dL Final      Magnesium Magnesium   Date Value Ref Range Status   02/14/2022 2.1 1.6 - 2.4 mg/dL Final      Uric Acid No components found for: URIC ACID     Imaging Results (Last 72 Hours)     Procedure Component Value Units Date/Time    CT Angiogram Abdomen Pelvis [[id number]] Collected: 02/13/22 1900     Updated: 02/13/22 1917    Narrative:         DATE OF EXAM:  2/13/2022 3:19 PM     PROCEDURE:  CT ANGIOGRAM ABDOMEN PELVIS-     INDICATIONS: Left renal hematoma, follow-up; W19.XXXA-Unspecified fall,  initial encounter; S37.012A-Minor contusion of left kidney, initial  encounter     COMPARISON:  CT abdomen pelvis with contrast 2/11/2022, left renal  angiogram 2/11/2022     TECHNIQUE:  Routine transaxial slices were obtained through the abdomen without the  administration of intravenous contrast. Additional scanning of the  abdomen and pelvis followed by the intravenous administration of 100 mL  Isovue 370. Reconstructed coronal and sagittal images were also  obtained. In addition, a 3-D volume rendered image was obtained after  postprocessing. Automated exposure control and iterative reconstruction  methods were used.     FINDINGS:     VASCULAR:  The descending thoracic aorta is normal in caliber. The celiac axis is  patent. The superior mesenteric artery is patent. Patent solitary  bilateral renal arteries. There is redemonstration of a left renal  perinephric hematoma which measures up to 3.4 cm in thickness at the mid  left kidney, previously measured 4.1 cm. The hematoma measures 4 cm in  thickness at the lower pole left kidney, previously 5.3 cm. There is no  discrete  area of active extravasation identified as was seen on the  prior study. The left kidney enhances normally.     The infrarenal aorta demonstrates mild to moderate atherosclerotic  calcifications without aneurysm. The inferior mesenteric artery is  patent. The right common and external iliac arteries are patent. The  left common and external iliac arteries are patent. Extensive  atherosclerotic calcification of the internal iliac arteries. The left  and right superficial femoral arteries and common femoral arteries are  patent bilaterally. There is no hematoma at the groin.     NON-VASCULAR:  Lung bases demonstrate mild bibasilar atelectasis with trace bilateral  effusions. No pericardial effusion. The liver and spleen are normal in  size and contour. Normal adrenal glands. Pancreas without findings of  pancreatitis. Gallbladder present. No pericholecystic inflammation. Left  perinephric hematoma again noted as above with small amount of fluid  tracking into the left retroperitoneum. Uterus and adnexa without acute  abnormality. Negative for pneumoperitoneum. No bowel obstruction.  Negative for pneumatosis intestinalis. The portal vein, splenic vein,  superior mesenteric vein are patent. Small amount of fluid in the left  paracolic gutter. Contrast noted in the bladder.       Impression:      1. Left perinephric hematoma decreased in size from 2/11/2022. No site  of active extravasation. Small amount of hemorrhage tracking into left  retroperitoneum also decreased from prior study.  2. Trace bilateral pleural effusions with mild dependent bibasilar  airspace disease likely atelectasis.  3. Additional incidental findings above.     Electronically Signed By-Marcos Kay MD On:2/13/2022 7:15 PM  This report was finalized on [id number] by  Marcos Kay MD.    IR Angiogram Renal 1st Order [[id number]] Collected: 02/11/22 1607     Updated: 02/11/22 1617    Narrative:      DATE OF EXAM:  2/11/2022 2:47 PM      PROCEDURE:  IR ANGIOGRAM RENAL 1ST ORDER-     INDICATIONS:  Fall/trauma with large left perinephric or subcapsular hematoma and  evidence of active hemorrhage by CT scan. Interventional radiology  consulted for possible embolization of bleeding left renal artery  branch. W19.XXXA-Unspecified fall, initial encounter; S37.012A-Minor  contusion of left kidney, initial encounter     COMPARISON:  CT scan performed earlier same day.     FLUOROSCOPIC TIME:  3 minutes 9 seconds     PHYSICIAN MONITORED CONSCIOUS SEDATION TIME:  31 minutes     CONTRAST MEDIA:  45 cc Isovue-300     DESCRIPTION OF PROCEDURE:  The patient's medications were reviewed prior to the procedure. The  procedure, risks and alternatives were discussed and informed written  consent was obtained. Maximal sterile barrier technique was utilized  throughout the procedure. The patient was placed supine on the  fluoroscopy table. The right groin was prepped and draped using maximum  sterile barrier technique. Lidocaine was used for local anesthesia.  Access was obtained of the right common femoral artery under direct  ultrasound visualization using a micropuncture set. A Hinge wire was  advanced into the abdominal aorta and a 5 [place]an hemostatic sheath  placed. Using a 4 [place]an Ashley's hook catheter, the left renal artery  was selectively catheterized. Multiple left renal angiograms were then  performed in multiple obliquities. No abnormal vessels are demonstrated.  No evidence of pseudoaneurysm or active extravasation is identified.  There is normal parenchymal enhancement of the left kidney. Incidental  note is made of moderate narrowing of the origin of the left renal  artery. This may be due to kinking from the rotational effects of the  large perinephric hematoma, however.     A right femoral angiogram was performed to confirm anatomy appropriate  for use a closure device. The site was reprepped sterilely. Additional  lidocaine was given  subcutaneously. Gloves were changed. Hemostasis was  achieved using an Angio-Seal closure device. Proximal and distal pulses  were intact following placement.     The procedure was performed under moderate conscious sedation with  continuous monitoring using Versed and fentanyl. 31 minutes of physician  monitored sedation were provided.       Impression:      Multiple left renal angiograms demonstrate no evidence of continued  active hemorrhage, pseudoaneurysm or abnormal vessel. No embolization  was performed or required.     Electronically Signed By-Juice Willingham MD On:2/11/2022 4:15 PM  This report was finalized on [id number] by  Juice Willingham MD.    CT Chest With Contrast Diagnostic [[id number]] Collected: 02/11/22 1229     Updated: 02/11/22 1236    Narrative:      CT CHEST W CONTRAST DIAGNOSTIC-, CT ABDOMEN PELVIS W CONTRAST-     Date of Exam: 2/11/2022 11:50 AM     Indication: Trauma severe left rib pain left upper quadrant pain.     Comparison: AP portable chest 11/23/2021. No prior abdominal/pelvic  imaging for comparison at this institution.     Technique: Serial and axial CT images of the chest were obtained  following the uneventful intravenous administration of 100 cc Isovue-370  contrast. Reconstructions in the coronal and sagittal planes were also  performed.  Automated exposure control and iterative reconstruction  methods were used.     FINDINGS:     CHEST: No acute osseous abnormalities are seen within the chest. No  displaced rib fracture is identified. There surgical changes of anterior  cervical spine fusion.     Heart size is normal. Mild coronary artery calcifications are present.  No pericardial effusion. No pleural effusion. No pneumothorax. Benign  calcified granuloma is present in the right lower lobe. Benign calcified  lymph node is seen in the right hilum. Minimal dependent atelectasis in  the lungs. No acute airspace disease.           ABDOMEN AND PELVIS: 1.2 x 4.1 x 1.4 cm left renal  subcapsular hematoma  is seen. Serpiginous high density material consistent with active IV  contrast extravasation, is seen within the hematoma, consistent with  active bleeding. No discrete, qing laceration defect is identified on  this examination. A small cyst projects anteriorly from the left lower  renal pole measuring 17 x 12 mm.     Right kidney, liver, gallbladder, spleen, pancreas and adrenal glands  are normal. Normal appendix. The bowel does not appear thickened or  inflamed. Urinary bladder, uterus, and rectum are normal. There is mild  left-sided retroperitoneal stranding extending from the level of the  kidney into the pelvis.     No acute osseous abnormality is seen within the abdomen or pelvis.             Impression:      1. Large left renal subcapsular hematoma with signs of active bleeding.  I notified Dr. Martinez in the emergency room prior to the time of this  dictation.  2. No acute findings in the chest or pelvis.     Electronically Signed By-Laura Garcia MD On:2/11/2022 12:34 PM  This report was finalized on [id number] by  Laura Garcia MD.    CT Abdomen Pelvis With Contrast [[id number]] Collected: 02/11/22 1229     Updated: 02/11/22 1236    Narrative:      CT CHEST W CONTRAST DIAGNOSTIC-, CT ABDOMEN PELVIS W CONTRAST-     Date of Exam: 2/11/2022 11:50 AM     Indication: Trauma severe left rib pain left upper quadrant pain.     Comparison: AP portable chest 11/23/2021. No prior abdominal/pelvic  imaging for comparison at this institution.     Technique: Serial and axial CT images of the chest were obtained  following the uneventful intravenous administration of 100 cc Isovue-370  contrast. Reconstructions in the coronal and sagittal planes were also  performed.  Automated exposure control and iterative reconstruction  methods were used.     FINDINGS:     CHEST: No acute osseous abnormalities are seen within the chest. No  displaced rib fracture is identified. There surgical changes of  anterior  cervical spine fusion.     Heart size is normal. Mild coronary artery calcifications are present.  No pericardial effusion. No pleural effusion. No pneumothorax. Benign  calcified granuloma is present in the right lower lobe. Benign calcified  lymph node is seen in the right hilum. Minimal dependent atelectasis in  the lungs. No acute airspace disease.           ABDOMEN AND PELVIS: 1.2 x 4.1 x 1.4 cm left renal subcapsular hematoma  is seen. Serpiginous high density material consistent with active IV  contrast extravasation, is seen within the hematoma, consistent with  active bleeding. No discrete, qing laceration defect is identified on  this examination. A small cyst projects anteriorly from the left lower  renal pole measuring 17 x 12 mm.     Right kidney, liver, gallbladder, spleen, pancreas and adrenal glands  are normal. Normal appendix. The bowel does not appear thickened or  inflamed. Urinary bladder, uterus, and rectum are normal. There is mild  left-sided retroperitoneal stranding extending from the level of the  kidney into the pelvis.     No acute osseous abnormality is seen within the abdomen or pelvis.             Impression:      1. Large left renal subcapsular hematoma with signs of active bleeding.  I notified Dr. Martinez in the emergency room prior to the time of this  dictation.  2. No acute findings in the chest or pelvis.     Electronically Signed By-Laura Garcia MD On:2/11/2022 12:34 PM  This report was finalized on [id number] by  Laura Garcia MD.          Results for orders placed during the hospital encounter of 11/23/21    XR Chest 1 View    Narrative  DATE OF EXAM:  11/23/2021 3:34 PM    PROCEDURE:  XR CHEST 1 VW-    INDICATIONS:  Acute Stroke Protocol (onset < 12 hrs); I63.9-Cerebral infarction,  unspecified; E11.65-Type 2 diabetes mellitus with hyperglycemia    COMPARISON:  1/20/2018    TECHNIQUE:  Single radiographic view of the chest was  obtained.    FINDINGS:  Cervical fixation hardware again noted. Slight elevation of right  hemidiaphragm. Evaluation of the bases is somewhat limited due to  overlying soft tissue. No definite focal consolidation is seen. Heart  size is within normal limits for technique. Aortic vascular  calcification is present. No pneumothorax identified.    Impression  Low lung volumes. No acute cardiopulmonary findings.    Electronically Signed By-Juice Willingham MD On:11/23/2021 3:52 PM  This report was finalized on [id number] by  Juice Willingham MD.            ASSESSMENT / PLAN      Fall    Kidney hematoma    Diabetes mellitus with coincident hypertension (HCC)    Type 2 diabetes mellitus with hyperlipidemia (HCC)    Diabetes mellitus with polyneuropathy (HCC)    1. ARF/OUMOU-------Nonoliguric. Resolved this AM. D/C IVFs post completion of current bag and follow. +ARF/OUMOU with normal baseline renal function. +ARF/OUMOU is secondary to ATN from initial hypotension, anemia with decreased renal perfusion, preadmission ACE-I use and IV dye exposure with angiogram/CT. No NSAIDs or further IV dye unless emergently needed.     2. DMII------Okay to restart Metformin if needed now that OUMOU is better     3. HTN WITH CKD-------BP ok now. Avoid hypotension. No ACE-I/ARB/DRI     4. ANEMIA/LEFT SUBCAPSULAR RENAL BLEED------S/P IR Angiogram and was not able to be embolized. More PRBCs today. Give IV iron for DARLEEN     5. DEPRESSION-----On SSRI     6. OBESITY     7. GERD/PUD PROPHYLAXIS------On PPI. Benefits outweigh risks despite OUMOU     8. DM PERIPHERAL NEUROPATHY-----Back down Neurontin given OUMOU     9. OA/DJD------D/C Ibuprofen. Counseled patient on avoidance of NSAIDs. Check uric acid level     10. H/O TIA    11. HYPOCALCEMIA-----Replace IV    Samanta Watkins MD  [facility]  [phone]  02/14/22  07:33 EST

## 2022-02-14 NOTE — PLAN OF CARE
Goal Outcome Evaluation:           Progress: no change  Outcome Summary: Patient resting quietly abed. O2 at 2L in use. Incentive spirometer use encouraged. PRN oral pain med given x 2 with good results. Last hgb 8.0. WIll continue to monitor.

## 2022-02-14 NOTE — PROGRESS NOTES
Urology Progress Note    Patient Identification:  Name:  Medina Ann  Age:  66 y.o.  Sex:  female  :  1955  MRN:  [id number]    Subjective:  Feeling better and wants to go home    Objective    Reviewed CT from yesterday and left kidney and hematoma looks improved with no urinary extravasation    Hemoglobin is 8.8 after another unit of blood    Problem List:  Patient Active Problem List   Diagnosis   • Cerebrovascular accident (CVA) (HCC)   • Left-sided weakness   • Dizziness   • Type 2 diabetes mellitus with hyperglycemia (HCC)   • Peripheral neuropathy   • Hyperlipidemia   • Anxiety associated with depression   • Obesity, Class III, BMI 40-49.9 (morbid obesity) (HCC)   • Acute UTI (urinary tract infection)   • Fall   • Kidney hematoma   • Diabetes mellitus with coincident hypertension (HCC)   • Type 2 diabetes mellitus with hyperlipidemia (HCC)   • Diabetes mellitus with polyneuropathy ([facility])     Past Medical History:  Past Medical History:   Diagnosis Date   • Diabetes mellitus (HCC)    • Hyperlipidemia    • Hypertension    • TIA (transient ischemic attack)      Past Surgical History:  Past Surgical History:   Procedure Laterality Date   • CERVICAL FUSION     •  SECTION       Home Meds:  Medications Prior to Admission   Medication Sig Dispense Refill Last Dose   • acetaminophen (TYLENOL) 325 MG tablet Take 2 tablets by mouth Every 4 (Four) Hours As Needed for Mild Pain .      • amLODIPine (NORVASC) 10 MG tablet Take 1 tablet by mouth Daily.   2/10/2022 at Unknown time   • aspirin 325 MG tablet Take 1 tablet by mouth Daily.   2/10/2022 at Unknown time   • atorvastatin (LIPITOR) 40 MG tablet Take 1 tablet by mouth Every Night.   2/10/2022 at Unknown time   • dextrose (GLUTOSE) 40 % gel Take 15 g by mouth Every 15 (Fifteen) Minutes As Needed for Low Blood Sugar (Blood sugar less than 70).      • gabapentin (NEURONTIN) 300 MG capsule Take 300 mg by mouth Daily With Breakfast & Lunch.   2/10/2022 at  Unknown time   • gabapentin (NEURONTIN) 300 MG capsule Take 600 mg by mouth Every Night.   2/10/2022 at Unknown time   • glucagon (human recombinant) 1 mg/mL in sterile water (preservative free) injection injection Inject 1 mL under the skin into the appropriate area as directed Every 15 (Fifteen) Minutes As Needed (Blood Glucose Less Than 70).      • insulin glargine (LANTUS, SEMGLEE) 100 UNIT/ML injection Inject 15 Units under the skin into the appropriate area as directed Every 12 (Twelve) Hours.  12 2/11/2022 at Unknown time   • insulin lispro (ADMELOG) 100 UNIT/ML injection Inject 0-24 Units under the skin into the appropriate area as directed 3 (Three) Times a Day Before Meals.  12    • insulin lispro (ADMELOG) 100 UNIT/ML injection Inject 5 Units under the skin into the appropriate area as directed 3 (Three) Times a Day With Meals.  12    • lisinopril (PRINIVIL,ZESTRIL) 20 MG tablet Take 1 tablet by mouth Daily.   2/10/2022 at Unknown time   • melatonin 5 MG tablet tablet Take 1 tablet by mouth At Night As Needed (insomnia).      • metFORMIN ER (GLUCOPHAGE-XR) 500 MG 24 hr tablet Take 1,000 mg by mouth 2 (Two) Times a Day.   2/10/2022 at Unknown time   • sertraline (ZOLOFT) 100 MG tablet Take 150 mg by mouth Daily.   2/10/2022 at Unknown time   • vitamin B-12 (CYANOCOBALAMIN) 1000 MCG tablet Take 1 tablet by mouth Daily.   2/10/2022 at Unknown time     Current Meds:    Current Facility-Administered Medications:   •  Acetylcysteine capsule 1,200 mg, 1,200 mg, Oral, BID, Aracelis King MD, 1,200 mg at 02/13/22 2028  •  atorvastatin (LIPITOR) tablet 40 mg, 40 mg, Oral, Nightly, Yesenia Bustamante APRN, 40 mg at 02/13/22 2028  •  cefTRIAXone (ROCEPHIN) in FI 1 gram/10ml IV PUSH syringe, 1 g, Intravenous, Q24H, Aracelis King MD, 1 g at 02/13/22 1650  •  dextrose (D50W) (25 g/50 mL) IV injection 25 g, 25 g, Intravenous, Q15 Min PRN, Yesenia Bustamante, DAYANA  •  dextrose (GLUTOSE) oral gel 15 g, 15 g, Oral, Q15 Min PRN,  Yesenia Bustamante APRN  •  ferric gluconate (FERRLECIT)125 MG in sodium chloride 0.9 % 100 mL IVPB, 125 mg, Intravenous, Daily, Montserrat Watkins MD, Last Rate: 100 mL/hr at 02/13/22 1649, 125 mg at 02/13/22 1649  •  gabapentin (NEURONTIN) capsule 300 mg, 300 mg, Oral, Daily With Breakfast & Lunch, Yesenia Bustamante APRN, 300 mg at 02/13/22 1230  •  gabapentin (NEURONTIN) capsule 300 mg, 300 mg, Oral, Nightly, Montserrat Watkins MD, 300 mg at 02/13/22 2028  •  glucagon (human recombinant) (GLUCAGEN DIAGNOSTIC) 1 mg in sterile water (preservative free) 1 mL injection, 1 mg, Intramuscular, Q15 Min PRN, Yesenia Bustamante APRCAMERON  •  hydrALAZINE (APRESOLINE) injection 10 mg, 10 mg, Intravenous, Q6H PRN, Yesenia Bustamante APRN, 10 mg at 02/14/22 0433  •  HYDROcodone-acetaminophen (NORCO) 7.5-325 MG per tablet 1 tablet, 1 tablet, Oral, Q6H PRN, Aracelis King MD, 1 tablet at 02/14/22 0433  •  insulin lispro (ADMELOG) injection 0-9 Units, 0-9 Units, Subcutaneous, TID AC, 2 Units at 02/13/22 1649 **AND** insulin lispro (ADMELOG) injection 0-9 Units, 0-9 Units, Subcutaneous, PRN, Yesenia Bustamante APRN  •  lidocaine (LIDODERM) 5 % 2 patch, 2 patch, Transdermal, Q24H, Aracelis King MD, 2 patch at 02/13/22 1034  •  melatonin tablet 5 mg, 5 mg, Oral, Nightly PRN, Yesenia Bustamante APRN, 5 mg at 02/13/22 2028  •  Morphine sulfate (PF) injection 4 mg, 4 mg, Intravenous, Q3H PRN, Aracelis King MD, 4 mg at 02/13/22 1406  •  ondansetron (ZOFRAN) tablet 4 mg, 4 mg, Oral, Q6H PRN **OR** ondansetron (ZOFRAN) injection 4 mg, 4 mg, Intravenous, Q6H PRN, Yesenia Bustamante APRN  •  pantoprazole (PROTONIX) EC tablet 40 mg, 40 mg, Oral, Q AM, Yesenia Bustamante APRN, 40 mg at 02/14/22 0432  •  sertraline (ZOLOFT) tablet 150 mg, 150 mg, Oral, Daily, Yesenia Bustamante APRN, 150 mg at 02/13/22 0905  •  [COMPLETED] Insert peripheral IV, , , Once **AND** sodium chloride 0.9 % flush 10 mL, 10 mL, Intravenous, PRN, Baljit Martinez MD, 10  "mL at 22  •  sodium chloride 0.9 % flush 10 mL, 10 mL, Intravenous, Q12H, Yesenia Bustamante APRN, 10 mL at 22  •  sodium chloride 0.9 % flush 10 mL, 10 mL, Intravenous, PRN, Yesenia Bustamante APRN  •  sodium chloride 0.9 % infusion, 75 mL/hr, Intravenous, Continuous, Montserrat Watkins MD, Last Rate: 75 mL/hr at 22, 75 mL/hr at 22  Allergies:  Patient has no known allergies.    Review of Systems:  Negative 12 point system review except for what is in HPI    Objective:  tMax 24 hours:  Temp (24hrs), Av.5 °F (36.9 °C), Min:98.2 °F (36.8 °C), Max:99.2 °F (37.3 °C)    Vital Sign Ranges:  Temp:  [98.2 °F (36.8 °C)-99.2 °F (37.3 °C)] 98.3 °F (36.8 °C)  Heart Rate:  [84-90] 86  Resp:  [16-20] 18  BP: (116-184)/(62-87) 174/70  Intake and Output Last 3 Shifts:  I/O last 3 completed shifts:  In: 2797.5 [P.O.:1440; I.V.:1000; Blood:357.5]  Out: -     Exam:  /70 (BP Location: Right arm, Patient Position: Lying)   Pulse 86   Temp 98.3 °F (36.8 °C) (Oral)   Resp 18   Ht 177.8 cm (70\")   Wt (!) 141 kg (311 lb 15.2 oz)   SpO2 92%   BMI 44.76 kg/m²    General Appearance:    Alert, cooperative, no acute distress, general       appearance is normal   Head:    Normocephalic, without obvious abnormality, atraumatic   Eyes:            Pupils/irises normal. Exterior inspection conjunctivae       and lids normal.   Ears:    Normal external inspection   Nose:   Exterior inspection of nose is normal   Throat:   Lips, mucosa, and tongue normal   Neck:   No adenopathy, supple, symmetrical, trachea midline   Back:     No CVA tenderness   Lungs:     Respirations unlabored; normal effort, no audible     abnormality   CV:   Regular rhythm and normal rate, no edema   Abdomen:     No hernia, examination of the abdomen is normal with     no masses, tenderness, or distension    Female :  Sore in left flank   Musculoskeletal:   Inspection of the nails and digits reveal no " abnormalities   Skin:   Inspection normal, palpation normal   Neurologic/Psych:   Orientation normal; Mood/Affect normal     Data Review:  All labs (24hrs):    Lab Results (last 24 hours)     Procedure Component Value Units Date/Time    Comprehensive Metabolic Panel [[id number]]  (Abnormal) Collected: 02/14/22 0424    Specimen: Blood Updated: 02/14/22 0619     Glucose 149 mg/dL      BUN 34 mg/dL      Creatinine 0.87 mg/dL      Sodium 135 mmol/L      Potassium 4.5 mmol/L      Chloride 102 mmol/L      CO2 23.0 mmol/L      Calcium 8.7 mg/dL      Total Protein 5.8 g/dL      Albumin 3.30 g/dL      ALT (SGPT) 9 U/L      AST (SGOT) 12 U/L      Alkaline Phosphatase 72 U/L      Total Bilirubin 0.4 mg/dL      eGFR Non African Amer 66 mL/min/1.73      Globulin 2.5 gm/dL      A/G Ratio 1.3 g/dL      BUN/Creatinine Ratio 39.1     Anion Gap 10.0 mmol/L     Narrative:      GFR Normal >60  Chronic Kidney Disease <60  Kidney Failure <15      Phosphorus [[id number]]  (Normal) Collected: 02/14/22 0424    Specimen: Blood Updated: 02/14/22 0617     Phosphorus 3.3 mg/dL     Magnesium [[id number]]  (Normal) Collected: 02/14/22 0424    Specimen: Blood Updated: 02/14/22 0617     Magnesium 2.1 mg/dL     Calcium, Ionized [[id number]]  (Abnormal) Collected: 02/14/22 0424    Specimen: Blood Updated: 02/14/22 0611     Ionized Calcium 1.19 mmol/L     CBC Auto Differential [[id number]]  (Abnormal) Collected: 02/14/22 0424    Specimen: Blood Updated: 02/14/22 0552     WBC 8.80 10*3/mm3      RBC 2.81 10*6/mm3      Hemoglobin 8.8 g/dL      Hematocrit 25.4 %      MCV 90.5 fL      MCH 31.3 pg      MCHC 34.6 g/dL      RDW 13.5 %      RDW-SD 42.4 fl      MPV 7.8 fL      Platelets 206 10*3/mm3      Neutrophil % 75.1 %      Lymphocyte % 14.2 %      Monocyte % 8.5 %      Eosinophil % 1.7 %      Basophil % 0.5 %      Neutrophils, Absolute 6.60 10*3/mm3      Lymphocytes, Absolute 1.30 10*3/mm3      Monocytes, Absolute 0.70 10*3/mm3      Eosinophils, Absolute 0.20  10*3/mm3      Basophils, Absolute 0.00 10*3/mm3      nRBC 0.0 /100 WBC     Hemoglobin & Hematocrit, Blood [[id number]]  (Abnormal) Collected: 02/14/22 0041    Specimen: Blood Updated: 02/14/22 0110     Hemoglobin 8.0 g/dL      Hematocrit 23.6 %     Hemoglobin & Hematocrit, Blood [[id number]]  (Abnormal) Collected: 02/13/22 2045    Specimen: Blood Updated: 02/13/22 2120     Hemoglobin 8.5 g/dL      Hematocrit 24.7 %     POC Glucose Once [[id number]]  (Abnormal) Collected: 02/13/22 2046    Specimen: Blood Updated: 02/13/22 2047     Glucose 154 mg/dL      Comment: Serial Number: [id number]:  [id number]       Hemoglobin & Hematocrit, Blood [[id number]]  (Abnormal) Collected: 02/13/22 1714    Specimen: Blood Updated: 02/13/22 1824     Hemoglobin 8.8 g/dL      Hematocrit 26.0 %     POC Glucose Once [[id number]]  (Abnormal) Collected: 02/13/22 1635    Specimen: Blood Updated: 02/13/22 1639     Glucose 154 mg/dL      Comment: Serial Number: [id number]:  [id number]       TSH [[id number]]  (Abnormal) Collected: 02/13/22 1319    Specimen: Blood Updated: 02/13/22 1352     TSH 0.178 uIU/mL     Hemoglobin & Hematocrit, Blood [[id number]]  (Abnormal) Collected: 02/13/22 1319    Specimen: Blood Updated: 02/13/22 1330     Hemoglobin 8.2 g/dL      Hematocrit 23.8 %     Eosinophil Smear - Urine, Urine, Clean Catch [[id number]]  (Normal) Collected: 02/13/22 1036    Specimen: Urine, Clean Catch Updated: 02/13/22 1225     Eosinophil Smear 0 % EOS/100 Cells     POC Glucose Once [[id number]]  (Abnormal) Collected: 02/13/22 1144    Specimen: Blood Updated: 02/13/22 1144     Glucose 174 mg/dL      Comment: Serial Number: [id number]:  [id number]       Sodium, Urine, Random - Urine, Clean Catch [[id number]] Collected: 02/13/22 1036    Specimen: Urine, Clean Catch Updated: 02/13/22 1108     Sodium, Urine 33 mmol/L     Narrative:      Reference intervals for random urine have not been established.  Clinical usage is dependent upon  physician's interpretation in combination with other laboratory tests.       Urinalysis, Microscopic Only - Urine, Clean Catch [[id number]]  (Abnormal) Collected: 02/13/22 1037    Specimen: Urine, Clean Catch Updated: 02/13/22 1101     RBC, UA 3-5 /HPF      WBC, UA 3-5 /HPF      Bacteria, UA None Seen /HPF      Squamous Epithelial Cells, UA 0-2 /HPF      Hyaline Casts, UA 3-6 /LPF      Methodology Automated Microscopy    Urinalysis With Culture If Indicated - Urine, Clean Catch [[id number]]  (Abnormal) Collected: 02/13/22 1037    Specimen: Urine, Clean Catch Updated: 02/13/22 1101     Color, UA Yellow     Appearance, UA Clear     pH, UA <=5.0     Specific Gravity, UA 1.029     Glucose,  mg/dL (Trace)     Ketones, UA Negative     Bilirubin, UA Negative     Blood, UA Small (1+)     Protein,  mg/dL (2+)     Leuk Esterase, UA Trace     Nitrite, UA Negative     Urobilinogen, UA 0.2 E.U./dL    Hemoglobin & Hematocrit, Blood [[id number]]  (Abnormal) Collected: 02/13/22 0832    Specimen: Blood Updated: 02/13/22 0916     Hemoglobin 8.6 g/dL      Hematocrit 25.1 %     Iron [[id number]]  (Abnormal) Collected: 02/13/22 0141    Specimen: Blood Updated: 02/13/22 0915     Iron 22 mcg/dL     Uric Acid [[id number]]  (Abnormal) Collected: 02/13/22 0141    Specimen: Blood Updated: 02/13/22 0915     Uric Acid 7.2 mg/dL     CK [[id number]]  (Normal) Collected: 02/13/22 0141    Specimen: Blood Updated: 02/13/22 0915     Creatine Kinase 151 U/L     POC Glucose Once [[id number]]  (Abnormal) Collected: 02/13/22 0714    Specimen: Blood Updated: 02/13/22 0715     Glucose 179 mg/dL      Comment: Serial Number: [id number]:  [id number]           Radiology:   Imaging Results (Last 72 Hours)     Procedure Component Value Units Date/Time    CT Angiogram Abdomen Pelvis [[id number]] Collected: 02/13/22 1900     Updated: 02/13/22 1917    Narrative:         DATE OF EXAM:  2/13/2022 3:19 PM     PROCEDURE:  CT ANGIOGRAM ABDOMEN PELVIS-      INDICATIONS: Left renal hematoma, follow-up; W19.XXXA-Unspecified fall,  initial encounter; S37.012A-Minor contusion of left kidney, initial  encounter     COMPARISON:  CT abdomen pelvis with contrast 2/11/2022, left renal  angiogram 2/11/2022     TECHNIQUE:  Routine transaxial slices were obtained through the abdomen without the  administration of intravenous contrast. Additional scanning of the  abdomen and pelvis followed by the intravenous administration of 100 mL  Isovue 370. Reconstructed coronal and sagittal images were also  obtained. In addition, a 3-D volume rendered image was obtained after  postprocessing. Automated exposure control and iterative reconstruction  methods were used.     FINDINGS:     VASCULAR:  The descending thoracic aorta is normal in caliber. The celiac axis is  patent. The superior mesenteric artery is patent. Patent solitary  bilateral renal arteries. There is redemonstration of a left renal  perinephric hematoma which measures up to 3.4 cm in thickness at the mid  left kidney, previously measured 4.1 cm. The hematoma measures 4 cm in  thickness at the lower pole left kidney, previously 5.3 cm. There is no  discrete area of active extravasation identified as was seen on the  prior study. The left kidney enhances normally.     The infrarenal aorta demonstrates mild to moderate atherosclerotic  calcifications without aneurysm. The inferior mesenteric artery is  patent. The right common and external iliac arteries are patent. The  left common and external iliac arteries are patent. Extensive  atherosclerotic calcification of the internal iliac arteries. The left  and right superficial femoral arteries and common femoral arteries are  patent bilaterally. There is no hematoma at the groin.     NON-VASCULAR:  Lung bases demonstrate mild bibasilar atelectasis with trace bilateral  effusions. No pericardial effusion. The liver and spleen are normal in  size and contour. Normal adrenal  glands. Pancreas without findings of  pancreatitis. Gallbladder present. No pericholecystic inflammation. Left  perinephric hematoma again noted as above with small amount of fluid  tracking into the left retroperitoneum. Uterus and adnexa without acute  abnormality. Negative for pneumoperitoneum. No bowel obstruction.  Negative for pneumatosis intestinalis. The portal vein, splenic vein,  superior mesenteric vein are patent. Small amount of fluid in the left  paracolic gutter. Contrast noted in the bladder.       Impression:      1. Left perinephric hematoma decreased in size from 2/11/2022. No site  of active extravasation. Small amount of hemorrhage tracking into left  retroperitoneum also decreased from prior study.  2. Trace bilateral pleural effusions with mild dependent bibasilar  airspace disease likely atelectasis.  3. Additional incidental findings above.     Electronically Signed By-Marcos Kay MD On:2/13/2022 7:15 PM  This report was finalized on [id number] by  Marcos Kay MD.    IR Angiogram Renal 1st Order [[id number]] Collected: 02/11/22 1607     Updated: 02/11/22 1617    Narrative:      DATE OF EXAM:  2/11/2022 2:47 PM     PROCEDURE:  IR ANGIOGRAM RENAL 1ST ORDER-     INDICATIONS:  Fall/trauma with large left perinephric or subcapsular hematoma and  evidence of active hemorrhage by CT scan. Interventional radiology  consulted for possible embolization of bleeding left renal artery  branch. W19.XXXA-Unspecified fall, initial encounter; S37.012A-Minor  contusion of left kidney, initial encounter     COMPARISON:  CT scan performed earlier same day.     FLUOROSCOPIC TIME:  3 minutes 9 seconds     PHYSICIAN MONITORED CONSCIOUS SEDATION TIME:  31 minutes     CONTRAST MEDIA:  45 cc Isovue-300     DESCRIPTION OF PROCEDURE:  The patient's medications were reviewed prior to the procedure. The  procedure, risks and alternatives were discussed and informed written  consent was obtained. Maximal sterile  barrier technique was utilized  throughout the procedure. The patient was placed supine on the  fluoroscopy table. The right groin was prepped and draped using maximum  sterile barrier technique. Lidocaine was used for local anesthesia.  Access was obtained of the right common femoral artery under direct  ultrasound visualization using a micropuncture set. A Bentson wire was  advanced into the abdominal aorta and a 5 Upper sorbian hemostatic sheath  placed. Using a 4 Upper sorbian Ashley's hook catheter, the left renal artery  was selectively catheterized. Multiple left renal angiograms were then  performed in multiple obliquities. No abnormal vessels are demonstrated.  No evidence of pseudoaneurysm or active extravasation is identified.  There is normal parenchymal enhancement of the left kidney. Incidental  note is made of moderate narrowing of the origin of the left renal  artery. This may be due to kinking from the rotational effects of the  large perinephric hematoma, however.     A right femoral angiogram was performed to confirm anatomy appropriate  for use a closure device. The site was reprepped sterilely. Additional  lidocaine was given subcutaneously. Gloves were changed. Hemostasis was  achieved using an Angio-Seal closure device. Proximal and distal pulses  were intact following placement.     The procedure was performed under moderate conscious sedation with  continuous monitoring using Versed and fentanyl. 31 minutes of physician  monitored sedation were provided.       Impression:      Multiple left renal angiograms demonstrate no evidence of continued  active hemorrhage, pseudoaneurysm or abnormal vessel. No embolization  was performed or required.     Electronically Signed By-Juice Willingham MD On:2/11/2022 4:15 PM  This report was finalized on [id number] by  Juice Willingham MD.    CT Chest With Contrast Diagnostic [[id number]] Collected: 02/11/22 1229     Updated: 02/11/22 1236    Narrative:      CT CHEST W  CONTRAST DIAGNOSTIC-, CT ABDOMEN PELVIS W CONTRAST-     Date of Exam: 2/11/2022 11:50 AM     Indication: Trauma severe left rib pain left upper quadrant pain.     Comparison: [facility] portable chest 11/23/2021. No prior abdominal/pelvic  imaging for comparison at this institution.     Technique: Serial and axial CT images of the chest were obtained  following the uneventful intravenous administration of 100 cc Isovue-370  contrast. Reconstructions in the coronal and sagittal planes were also  performed.  Automated exposure control and iterative reconstruction  methods were used.     FINDINGS:     CHEST: No acute osseous abnormalities are seen within the chest. No  displaced rib fracture is identified. There surgical changes of anterior  cervical spine fusion.     Heart size is normal. Mild coronary artery calcifications are present.  No pericardial effusion. No pleural effusion. No pneumothorax. Benign  calcified granuloma is present in the right lower lobe. Benign calcified  lymph node is seen in the right hilum. Minimal dependent atelectasis in  the lungs. No acute airspace disease.           ABDOMEN AND PELVIS: 1.2 x 4.1 x 1.4 cm left renal subcapsular hematoma  is seen. Serpiginous high density material consistent with active IV  contrast extravasation, is seen within the hematoma, consistent with  active bleeding. No discrete, qing laceration defect is identified on  this examination. A small cyst projects anteriorly from the left lower  renal pole measuring 17 x 12 mm.     Right kidney, liver, gallbladder, spleen, pancreas and adrenal glands  are normal. Normal appendix. The bowel does not appear thickened or  inflamed. Urinary bladder, uterus, and rectum are normal. There is mild  left-sided retroperitoneal stranding extending from the level of the  kidney into the pelvis.     No acute osseous abnormality is seen within the abdomen or pelvis.             Impression:      1. Large left renal subcapsular hematoma  with signs of active bleeding.  I notified Dr. Martinez in the emergency room prior to the time of this  dictation.  2. No acute findings in the chest or pelvis.     Electronically Signed By-Laura Garcia MD On:2/11/2022 12:34 PM  This report was finalized on [id number] by  Laura Garcia MD.    CT Abdomen Pelvis With Contrast [[id number]] Collected: 02/11/22 1229     Updated: 02/11/22 1236    Narrative:      CT CHEST W CONTRAST DIAGNOSTIC-, CT ABDOMEN PELVIS W CONTRAST-     Date of Exam: 2/11/2022 11:50 AM     Indication: Trauma severe left rib pain left upper quadrant pain.     Comparison: [facility] portable chest 11/23/2021. No prior abdominal/pelvic  imaging for comparison at this institution.     Technique: Serial and axial CT images of the chest were obtained  following the uneventful intravenous administration of 100 cc Isovue-370  contrast. Reconstructions in the coronal and sagittal planes were also  performed.  Automated exposure control and iterative reconstruction  methods were used.     FINDINGS:     CHEST: No acute osseous abnormalities are seen within the chest. No  displaced rib fracture is identified. There surgical changes of anterior  cervical spine fusion.     Heart size is normal. Mild coronary artery calcifications are present.  No pericardial effusion. No pleural effusion. No pneumothorax. Benign  calcified granuloma is present in the right lower lobe. Benign calcified  lymph node is seen in the right hilum. Minimal dependent atelectasis in  the lungs. No acute airspace disease.           ABDOMEN AND PELVIS: 1.2 x 4.1 x 1.4 cm left renal subcapsular hematoma  is seen. Serpiginous high density material consistent with active IV  contrast extravasation, is seen within the hematoma, consistent with  active bleeding. No discrete, qing laceration defect is identified on  this examination. A small cyst projects anteriorly from the left lower  renal pole measuring 17 x 12 mm.     Right kidney, liver,  gallbladder, spleen, pancreas and adrenal glands  are normal. Normal appendix. The bowel does not appear thickened or  inflamed. Urinary bladder, uterus, and rectum are normal. There is mild  left-sided retroperitoneal stranding extending from the level of the  kidney into the pelvis.     No acute osseous abnormality is seen within the abdomen or pelvis.             Impression:      1. Large left renal subcapsular hematoma with signs of active bleeding.  I notified Dr. Martinez in the emergency room prior to the time of this  dictation.  2. No acute findings in the chest or pelvis.     Electronically Signed By-Laura Garcia MD On:2/11/2022 12:34 PM  This report was finalized on [id number] by  Laura Garcia MD.          Assessment:  Left renal hematoma which appears to be stabilized on CT scan and her hemoglobin is well.  IR did not see anything actively bleeding on admission.  Patient wants to go home and should take it easy and avoid blood thinners at this time.  We will see her in the office or sooner if needed      Campos Ng MD  2/14/2022  06:20 EST

## 2022-02-14 NOTE — DISCHARGE PLACEMENT REQUEST
"Medina Stewart (66 y.o. Female)             Date of Birth Social Security Number Address Home Phone MRN    [id number]  [id number] [place] [address] [place] [facility] [phone] [id number]    Hoahaoism Marital Status             Mandaen        Admission Date Admission Type Admitting Provider Attending Provider Department, Room/Bed    2/11/22 Emergency Aracelis King MD Lowney, Kay, MD [facility] 2B MEDICAL INPATIENT, 225/1    Discharge Date Discharge Disposition Discharge Destination           Home or Self Care              Attending Provider: Aracelis King MD    Allergies: No Known Allergies    Isolation: None   Infection: None   Code Status: CPR   Advance Care Planning Activity    Ht: 177.8 cm (70\")   Wt: 141 kg (311 lb 15.2 oz)    Admission Cmt: None   Principal Problem: Kidney hematoma [S37.019A]                 Active Insurance as of 2/11/2022     Primary Coverage     Payor Plan Insurance Group Employer/Plan Group    [employer] BLUE CROSS AN[employer]M BLUE CROSS BLUE SHIELD PPO [id number]     Payor Plan Address Payor Plan Phone Number Payor Plan Fax Number Effective Dates    [address] [phone]  5/1/2021 - None Entered    [facility] [id number]       Subscriber Name Subscriber Birth Date Member ID       MEDINA STEWART 1955 [id number]                 Emergency Contacts      (Rel.) Home Phone Work Phone Mobile Phone    DIANA ACEVEDO (Sister) -- -- [phone]    Trevin Stewart (Son) -- -- [phone]               History & Physical      Yesenia Bustamante APRN at 02/11/22 1726     Attestation signed by Aracelis King MD at 02/12/22 1608    I have reviewed this documentation and agree.                    Patient Care Team:  Baljit Samaniego MD as PCP - General (Family Medicine)    Chief complaint  Left rib  Pain post fall    Subjective     Patient is a 66 y.o. female presents  To ER with left side rib pain post fall . Onset of symptoms was today.  She reports that she " tripped and fell.  Pt with hx of Diabetes with HTN / HLD/ Neuropathy and TIA. Er found CMP with glucose 228, cr 0.8 WBC 15.6, hgb 11.5. CT chest abd pelvis found a large left renal hematoma with signs of active bleeding. Two hours after the initial hgb of 11.5, HGB dropped to 10.7. Urology was consulted. Pt was taken to IR. Radiology found no evidence of active hemorrhage, pseudo aneurysm or abnormal vessel. No embolization was performed or required. She has been admitted for further evaluation and management.     Review of Systems   Constitutional: Positive for activity change. Negative for fever.   HENT: Negative for trouble swallowing.    Eyes: Negative for visual disturbance.   Respiratory: Negative for cough and shortness of breath.    Cardiovascular: Negative for chest pain, palpitations and leg swelling.   Gastrointestinal: Positive for abdominal pain and nausea. Negative for constipation, diarrhea and vomiting.   Genitourinary: Negative for difficulty urinating.   Musculoskeletal: Positive for arthralgias, back pain and gait problem.   Neurological: Positive for weakness.   Psychiatric/Behavioral: Negative for confusion.          History  Past Medical History:   Diagnosis Date   • Diabetes mellitus (HCC)    • Hyperlipidemia    • Hypertension    • TIA (transient ischemic attack)      Past Surgical History:   Procedure Laterality Date   • CERVICAL FUSION     •  SECTION       Family History   Problem Relation Age of Onset   • Heart disease Father      Social History     Tobacco Use   • Smoking status: Former Smoker   • Smokeless tobacco: Never Used   Substance Use Topics   • Alcohol use: Yes     Comment: once or twice a year   • Drug use: Never     (Not in a hospital admission)    Allergies:  Patient has no known allergies.    Objective     Vital Signs  Temp:  [96.6 °F (35.9 °C)-97.7 °F (36.5 °C)] 96.6 °F (35.9 °C)  Heart Rate:  [75-96] 95  Resp:  [16-20] 20  BP: (133-200)/() 164/87     Physical  Exam:      General Appearance:    Alert, cooperative, in no acute distress- pale - obese   Head:    Normocephalic, without obvious abnormality, atraumatic   Eyes:            Lids and lashes normal, conjunctivae and sclerae normal, no   icterus, no pallor, corneas clear, PERRLA   Ears:    Ears appear intact with no abnormalities noted   Throat:   No oral lesions, no thrush, oral mucosa moist   Neck:   No adenopathy, supple, trachea midline, no thyromegaly, no   carotid bruit, no JVD   Lungs:     Clear to auscultation,respirations regular, even and                  unlabored    Heart:    Regular rhythm and normal rate, normal S1 and S2, no            murmur, no gallop, no rub, no click   Chest Wall:    No abnormalities observed   Abdomen:     Obese, BS positive x4, soft- TTP diffusely    Extremities:    Able to move extremities but with pain with all movement. , no edema, no cyanosis, no             redness   Pulses:   Pulses palpable and equal bilaterally   Skin:   No bleeding, bruising or rash   Lymph nodes:   No palpable adenopathy   Neurologic:   Asleep - arouses with verbal stimuli- Ox 4. Asking for pain meds then falls back to sleep.        Results Review:     Imaging Results (Last 24 Hours)     Procedure Component Value Units Date/Time    IR Angiogram Renal 1st Order [[id number]] Collected: 02/11/22 1607     Updated: 02/11/22 1617    Narrative:      DATE OF EXAM:  2/11/2022 2:47 PM     PROCEDURE:  IR ANGIOGRAM RENAL 1ST ORDER-     INDICATIONS:  Fall/trauma with large left perinephric or subcapsular hematoma and  evidence of active hemorrhage by CT scan. Interventional radiology  consulted for possible embolization of bleeding left renal artery  branch. W19.XXXA-Unspecified fall, initial encounter; S37.012A-Minor  contusion of left kidney, initial encounter     COMPARISON:  CT scan performed earlier same day.     FLUOROSCOPIC TIME:  3 minutes 9 seconds     PHYSICIAN MONITORED CONSCIOUS SEDATION TIME:  31  minutes     CONTRAST MEDIA:  45 cc Isovue-300     DESCRIPTION OF PROCEDURE:  The patient's medications were reviewed prior to the procedure. The  procedure, risks and alternatives were discussed and informed written  consent was obtained. Maximal sterile barrier technique was utilized  throughout the procedure. The patient was placed supine on the  fluoroscopy table. The right groin was prepped and draped using maximum  sterile barrier technique. Lidocaine was used for local anesthesia.  Access was obtained of the right common femoral artery under direct  ultrasound visualization using a micropuncture set. A Halalati wire was  advanced into the abdominal aorta and a 5 Romanian hemostatic sheath  placed. Using a 4 Romanian Ashley's hook catheter, the left renal artery  was selectively catheterized. Multiple left renal angiograms were then  performed in multiple obliquities. No abnormal vessels are demonstrated.  No evidence of pseudoaneurysm or active extravasation is identified.  There is normal parenchymal enhancement of the left kidney. Incidental  note is made of moderate narrowing of the origin of the left renal  artery. This may be due to kinking from the rotational effects of the  large perinephric hematoma, however.     A right femoral angiogram was performed to confirm anatomy appropriate  for use a closure device. The site was reprepped sterilely. Additional  lidocaine was given subcutaneously. Gloves were changed. Hemostasis was  achieved using an Angio-Seal closure device. Proximal and distal pulses  were intact following placement.     The procedure was performed under moderate conscious sedation with  continuous monitoring using Versed and fentanyl. 31 minutes of physician  monitored sedation were provided.       Impression:      Multiple left renal angiograms demonstrate no evidence of continued  active hemorrhage, pseudoaneurysm or abnormal vessel. No embolization  was performed or required.      Electronically Signed By-Juice Willingham MD On:2/11/2022 4:15 PM  This report was finalized on [id number] by  Juice Willingham MD.    CT Chest With Contrast Diagnostic [[id number]] Collected: 02/11/22 1229     Updated: 02/11/22 1236    Narrative:      CT CHEST W CONTRAST DIAGNOSTIC-, CT ABDOMEN PELVIS W CONTRAST-     Date of Exam: 2/11/2022 11:50 AM     Indication: Trauma severe left rib pain left upper quadrant pain.     Comparison: AP portable chest 11/23/2021. No prior abdominal/pelvic  imaging for comparison at this institution.     Technique: Serial and axial CT images of the chest were obtained  following the uneventful intravenous administration of 100 cc Isovue-370  contrast. Reconstructions in the coronal and sagittal planes were also  performed.  Automated exposure control and iterative reconstruction  methods were used.     FINDINGS:     CHEST: No acute osseous abnormalities are seen within the chest. No  displaced rib fracture is identified. There surgical changes of anterior  cervical spine fusion.     Heart size is normal. Mild coronary artery calcifications are present.  No pericardial effusion. No pleural effusion. No pneumothorax. Benign  calcified granuloma is present in the right lower lobe. Benign calcified  lymph node is seen in the right hilum. Minimal dependent atelectasis in  the lungs. No acute airspace disease.           ABDOMEN AND PELVIS: 1.2 x 4.1 x 1.4 cm left renal subcapsular hematoma  is seen. Serpiginous high density material consistent with active IV  contrast extravasation, is seen within the hematoma, consistent with  active bleeding. No discrete, qing laceration defect is identified on  this examination. A small cyst projects anteriorly from the left lower  renal pole measuring 17 x 12 mm.     Right kidney, liver, gallbladder, spleen, pancreas and adrenal glands  are normal. Normal appendix. The bowel does not appear thickened or  inflamed. Urinary bladder, uterus, and rectum are  normal. There is mild  left-sided retroperitoneal stranding extending from the level of the  kidney into the pelvis.     No acute osseous abnormality is seen within the abdomen or pelvis.             Impression:      1. Large left renal subcapsular hematoma with signs of active bleeding.  I notified Dr. Martinez in the emergency room prior to the time of this  dictation.  2. No acute findings in the chest or pelvis.     Electronically Signed By-Laura Gacria MD On:2/11/2022 12:34 PM  This report was finalized on [id number] by  Laura Garcia MD.    CT Abdomen Pelvis With Contrast [[id number]] Collected: 02/11/22 1229     Updated: 02/11/22 1236    Narrative:      CT CHEST W CONTRAST DIAGNOSTIC-, CT ABDOMEN PELVIS W CONTRAST-     Date of Exam: 2/11/2022 11:50 AM     Indication: Trauma severe left rib pain left upper quadrant pain.     Comparison: AP portable chest 11/23/2021. No prior abdominal/pelvic  imaging for comparison at this institution.     Technique: Serial and axial CT images of the chest were obtained  following the uneventful intravenous administration of 100 cc Isovue-370  contrast. Reconstructions in the coronal and sagittal planes were also  performed.  Automated exposure control and iterative reconstruction  methods were used.     FINDINGS:     CHEST: No acute osseous abnormalities are seen within the chest. No  displaced rib fracture is identified. There surgical changes of anterior  cervical spine fusion.     Heart size is normal. Mild coronary artery calcifications are present.  No pericardial effusion. No pleural effusion. No pneumothorax. Benign  calcified granuloma is present in the right lower lobe. Benign calcified  lymph node is seen in the right hilum. Minimal dependent atelectasis in  the lungs. No acute airspace disease.           ABDOMEN AND PELVIS: 1.2 x 4.1 x 1.4 cm left renal subcapsular hematoma  is seen. Serpiginous high density material consistent with active IV  contrast  extravasation, is seen within the hematoma, consistent with  active bleeding. No discrete, qing laceration defect is identified on  this examination. A small cyst projects anteriorly from the left lower  renal pole measuring 17 x 12 mm.     Right kidney, liver, gallbladder, spleen, pancreas and adrenal glands  are normal. Normal appendix. The bowel does not appear thickened or  inflamed. Urinary bladder, uterus, and rectum are normal. There is mild  left-sided retroperitoneal stranding extending from the level of the  kidney into the pelvis.     No acute osseous abnormality is seen within the abdomen or pelvis.             Impression:      1. Large left renal subcapsular hematoma with signs of active bleeding.  I notified Dr. Martinez in the emergency room prior to the time of this  dictation.  2. No acute findings in the chest or pelvis.     Electronically Signed By-Laura Garcia MD On:2/11/2022 12:34 PM  This report was finalized on [id number] by  Laura Garcia MD.           Lab Results (last 24 hours)     Procedure Component Value Units Date/Time    COVID PRE-OP / PRE-PROCEDURE SCREENING ORDER (NO ISOLATION) - Swab, Nasopharynx [[id number]]  (Normal) Collected: 02/11/22 1341    Specimen: Swab from Nasopharynx Updated: 02/11/22 1406    Narrative:      The following orders were created for panel order COVID PRE-OP / PRE-PROCEDURE SCREENING ORDER (NO ISOLATION) - Swab, Nasopharynx.  Procedure                               Abnormality         Status                     ---------                               -----------         ------                     COVID-19,CEPHEID/DINO,CO...[[id number]]  Normal              Final result                 Please view results for these tests on the individual orders.    COVID-19,CEPHEID/DINO,COR/GA/PAD/JOSH IN-HOUSE(OR EMERGENT/ADD-ON),NP SWAB IN TRANSPORT MEDIA 3-4 HR TAT, RT-PCR - Swab, Nasopharynx [[id number]]  (Normal) Collected: 02/11/22 1341    Specimen: Swab from  Nasopharynx Updated: 02/11/22 1406     COVID19 Not Detected    Narrative:      Fact sheet for providers: https://www.fda.gov/media/699348/download     Fact sheet for patients: https://www.fda.gov/media/275770/download  Fact sheet for providers: https://www.fda.gov/media/026443/download    Fact sheet for patients: https://www.fda.gov/media/019542/download    Test performed by PCR.    aPTT [[id number]]  (Normal) Collected: 02/11/22 1341    Specimen: Blood Updated: 02/11/22 1401     PTT 24.2 seconds     Protime-INR [[id number]]  (Normal) Collected: 02/11/22 1341    Specimen: Blood Updated: 02/11/22 1401     Protime 10.9 Seconds      INR 0.98    Hemoglobin & Hematocrit, Blood [[id number]]  (Abnormal) Collected: 02/11/22 1341    Specimen: Blood Updated: 02/11/22 1348     Hemoglobin 10.7 g/dL      Hematocrit 31.5 %     Comprehensive Metabolic Panel [[id number]]  (Abnormal) Collected: 02/11/22 1040    Specimen: Blood Updated: 02/11/22 1108     Glucose 288 mg/dL      BUN 28 mg/dL      Creatinine 0.81 mg/dL      Sodium 138 mmol/L      Potassium 4.3 mmol/L      Chloride 103 mmol/L      CO2 21.0 mmol/L      Calcium 8.9 mg/dL      Total Protein 6.0 g/dL      Albumin 3.50 g/dL      ALT (SGPT) 14 U/L      AST (SGOT) 13 U/L      Alkaline Phosphatase 77 U/L      Total Bilirubin 0.3 mg/dL      eGFR Non African Amer 71 mL/min/1.73      Globulin 2.5 gm/dL      A/G Ratio 1.4 g/dL      BUN/Creatinine Ratio 34.6     Anion Gap 14.0 mmol/L     Narrative:      GFR Normal >60  Chronic Kidney Disease <60  Kidney Failure <15      Lipase [[id number]]  (Normal) Collected: 02/11/22 1040    Specimen: Blood Updated: 02/11/22 1108     Lipase 51 U/L     Amylase [[id number]]  (Normal) Collected: 02/11/22 1040    Specimen: Blood Updated: 02/11/22 1108     Amylase 56 U/L     CBC & Differential [[id number]]  (Abnormal) Collected: 02/11/22 1040    Specimen: Blood Updated: 02/11/22 1048    Narrative:      The following orders were created for panel order CBC &  Differential.  Procedure                               Abnormality         Status                     ---------                               -----------         ------                     CBC Auto Differential[[id number]]        Abnormal            Final result                 Please view results for these tests on the individual orders.    CBC Auto Differential [[id number]]  (Abnormal) Collected: 02/11/22 1040    Specimen: Blood Updated: 02/11/22 1048     WBC 15.60 10*3/mm3      RBC 3.88 10*6/mm3      Hemoglobin 11.5 g/dL      Hematocrit 34.4 %      MCV 88.7 fL      MCH 29.6 pg      MCHC 33.4 g/dL      RDW 13.6 %      RDW-SD 42.9 fl      MPV 7.2 fL      Platelets 351 10*3/mm3      Neutrophil % 86.7 %      Lymphocyte % 9.5 %      Monocyte % 3.2 %      Eosinophil % 0.3 %      Basophil % 0.3 %      Neutrophils, Absolute 13.50 10*3/mm3      Lymphocytes, Absolute 1.50 10*3/mm3      Monocytes, Absolute 0.50 10*3/mm3      Eosinophils, Absolute 0.00 10*3/mm3      Basophils, Absolute 0.00 10*3/mm3      nRBC 0.1 /100 WBC            I reviewed the patient's new clinical results.    Assessment/Plan       * No active hospital problems. *    Fall - Pt reports a trip and fall. PT / OT when stable and pain improved.     Hematoma of the left kidney- post L renal angiograms. No evidence of active hemorrhage. Control pain .       T2DM with HTN/. HLD/ Neuropathy - continue home meds. SSI for glucose control.     Mood disorder- continue zoloft    I discussed the patients findings and my recommendations with patient.     Yesenia Bustamante, DAYANA  02/11/22  17:26 EST        Electronically signed by Aracelis Kign MD at 02/12/22 7427

## 2022-02-14 NOTE — PAYOR COMM NOTE
"Requesting IP Auth for ER Admission  RE:  Medina Stewart  1955  Policy no. [id number]    AUTHORIZATION PENDING  PLEASE FORWARD DETERMINATION TO FOLLOWING CONTACT:    OMAR MCCLOUD LPN UR  Utilization Review Nurse  [facility]  Direct & confidential phone # [phone]  Fax # [phone]      Medina Stewart (66 y.o. Female)             Date of Birth Social Security Number Address Home Phone MRN    1955  [id number] [place] [id number] [phone] [id number]    [place] Mar[employer] Status             Restorationism        Admission Date Admission Type Admitting Provider Attending Provider Department, Room/Bed    22 Emergency Aracelis King MD Lowney, Kay, MD [facility] 2B MEDICAL INPATIENT,     Discharge Date Discharge Disposition Discharge Destination                         Attending Provider: Aracelis King MD    Allergies: No Known Allergies    Isolation: None   Infection: None   Code Status: CPR   Advance Care Planning Activity    Ht: 177.8 cm (70\")   Wt: 141 kg (311 lb 15.2 oz)    Admission Cmt: None   Principal Problem: Fall [W19.XXXA]                 Active Insurance as of 2022     Primary Coverage     Payor Plan Insurance Group Employer/Plan Group    [id number] ANTH BLUE [employer] [facility]UE [facility] PPO [id number]     Payor Plan Address Payor Plan Phone Number Payor Plan Fax Number Effective Dates    [address] [phone]  2021 - None Entered    [place] [address]       Subscriber Name Subscriber Birth Date Member ID       MEDINA STEWART 1955 [id number]                 Emergency Contacts      (Rel.) Home Phone Work Phone Mobile Phone    DIANA ACEVEDO (Sister) -- -- [phone]    Trevin Stewart (Son) -- -- [phone]               History & Physical      Yesenia Bustamante APRN at 22 1726     Attestation signed by Aracelis King MD at 22 1603    I have reviewed this documentation and " agree.                    Patient Care Team:  Baljit Samaniego MD as PCP - General (Family Medicine)    Chief complaint  Left rib  Pain post fall    Subjective     Patient is a 66 y.o. female presents  To ER with left side rib pain post fall . Onset of symptoms was today.  She reports that she tripped and fell.  Pt with hx of Diabetes with HTN / HLD/ Neuropathy and TIA. Er found CMP with glucose 228, cr 0.8 WBC 15.6, hgb 11.5. CT chest abd pelvis found a large left renal hematoma with signs of active bleeding. Two hours after the initial hgb of 11.5, HGB dropped to 10.7. Urology was consulted. Pt was taken to IR. Radiology found no evidence of active hemorrhage, pseudo aneurysm or abnormal vessel. No embolization was performed or required. She has been admitted for further evaluation and management.     Review of Systems   Constitutional: Positive for activity change. Negative for fever.   HENT: Negative for trouble swallowing.    Eyes: Negative for visual disturbance.   Respiratory: Negative for cough and shortness of breath.    Cardiovascular: Negative for chest pain, palpitations and leg swelling.   Gastrointestinal: Positive for abdominal pain and nausea. Negative for constipation, diarrhea and vomiting.   Genitourinary: Negative for difficulty urinating.   Musculoskeletal: Positive for arthralgias, back pain and gait problem.   Neurological: Positive for weakness.   Psychiatric/Behavioral: Negative for confusion.          History  Past Medical History:   Diagnosis Date   • Diabetes mellitus (HCC)    • Hyperlipidemia    • Hypertension    • TIA (transient ischemic attack)      Past Surgical History:   Procedure Laterality Date   • CERVICAL FUSION     •  SECTION       Family History   Problem Relation Age of Onset   • Heart disease Father      Social History     Tobacco Use   • Smoking status: Former Smoker   • Smokeless tobacco: Never Used   Substance Use Topics   • Alcohol use: Yes     Comment: once or  twice a year   • Drug use: Never     (Not in a hospital admission)    Allergies:  Patient has no known allergies.    Objective     Vital Signs  Temp:  [96.6 °F (35.9 °C)-97.7 °F (36.5 °C)] 96.6 °F (35.9 °C)  Heart Rate:  [75-96] 95  Resp:  [16-20] 20  BP: (133-200)/() 164/87     Physical Exam:      General Appearance:    Alert, cooperative, in no acute distress- pale - obese   Head:    Normocephalic, without obvious abnormality, atraumatic   Eyes:            Lids and lashes normal, conjunctivae and sclerae normal, no   icterus, no pallor, corneas clear, PERRLA   Ears:    Ears appear intact with no abnormalities noted   Throat:   No oral lesions, no thrush, oral mucosa moist   Neck:   No adenopathy, supple, trachea midline, no thyromegaly, no   carotid bruit, no JVD   Lungs:     Clear to auscultation,respirations regular, even and                  unlabored    Heart:    Regular rhythm and normal rate, normal S1 and S2, no            murmur, no gallop, no rub, no click   Chest Wall:    No abnormalities observed   Abdomen:     Obese, BS positive x4, soft- TTP diffusely    Extremities:    Able to move extremities but with pain with all movement. , no edema, no cyanosis, no             redness   Pulses:   Pulses palpable and equal bilaterally   Skin:   No bleeding, bruising or rash   Lymph nodes:   No palpable adenopathy   Neurologic:   Asleep - arouses with verbal stimuli- Ox 4. Asking for pain meds then falls back to sleep.        Results Review:     Imaging Results (Last 24 Hours)     Procedure Component Value Units Date/Time    IR Angiogram Renal 1st Order [[id number]] Collected: 02/11/22 1607     Updated: 02/11/22 1617    Narrative:      DATE OF EXAM:  2/11/2022 2:47 PM     PROCEDURE:  IR ANGIOGRAM RENAL 1ST ORDER-     INDICATIONS:  Fall/trauma with large left perinephric or subcapsular hematoma and  evidence of active hemorrhage by CT scan. Interventional radiology  consulted for possible embolization of  bleeding left renal artery  branch. W19.XXXA-Unspecified fall, initial encounter; S37.012A-Minor  contusion of left kidney, initial encounter     COMPARISON:  CT scan performed earlier same day.     FLUOROSCOPIC TIME:  3 minutes 9 seconds     PHYSICIAN MONITORED CONSCIOUS SEDATION TIME:  31 minutes     CONTRAST MEDIA:  45 cc Isovue-300     DESCRIPTION OF PROCEDURE:  The patient's medications were reviewed prior to the procedure. The  procedure, risks and alternatives were discussed and informed written  consent was obtained. Maximal sterile barrier technique was utilized  throughout the procedure. The patient was placed supine on the  fluoroscopy table. The right groin was prepped and draped using maximum  sterile barrier technique. Lidocaine was used for local anesthesia.  Access was obtained of the right common femoral artery under direct  ultrasound visualization using a micropuncture set. A [employer] wire was  advanced into the abdominal aorta and a 5 Swedish hemostatic sheath  placed. Using a 4 Swedish Ashley's hook catheter, the left renal artery  was selectively catheterized. Multiple left renal angiograms were then  performed in multiple obliquities. No abnormal vessels are demonstrated.  No evidence of pseudoaneurysm or active extravasation is identified.  There is normal parenchymal enhancement of the left kidney. Incidental  note is made of moderate narrowing of the origin of the left renal  artery. This may be due to kinking from the rotational effects of the  large perinephric hematoma, however.     A right femoral angiogram was performed to confirm anatomy appropriate  for use a closure device. The site was reprepped sterilely. Additional  lidocaine was given subcutaneously. Gloves were changed. Hemostasis was  achieved using an Angio-Seal closure device. Proximal and distal pulses  were intact following placement.     The procedure was performed under moderate conscious sedation with  continuous  monitoring using Versed and fentanyl. 31 minutes of physician  monitored sedation were provided.       Impression:      Multiple left renal angiograms demonstrate no evidence of continued  active hemorrhage, pseudoaneurysm or abnormal vessel. No embolization  was performed or required.     Electronically Signed By-Juice Willingham MD On:2/11/2022 4:15 PM  This report was finalized on [id number] by  Juice Willingham MD.    CT Chest With Contrast Diagnostic [[id number]] Collected: 02/11/22 1229     Updated: 02/11/22 1236    Narrative:      CT CHEST W CONTRAST DIAGNOSTIC-, CT ABDOMEN PELVIS W CONTRAST-     Date of Exam: 2/11/2022 11:50 AM     Indication: Trauma severe left rib pain left upper quadrant pain.     Comparison: AP portable chest 11/23/2021. No prior abdominal/pelvic  imaging for comparison at this institution.     Technique: Serial and axial CT images of the chest were obtained  following the uneventful intravenous administration of 100 cc Isovue-370  contrast. Reconstructions in the coronal and sagittal planes were also  performed.  Automated exposure control and iterative reconstruction  methods were used.     FINDINGS:     CHEST: No acute osseous abnormalities are seen within the chest. No  displaced rib fracture is identified. There surgical changes of anterior  cervical spine fusion.     Heart size is normal. Mild coronary artery calcifications are present.  No pericardial effusion. No pleural effusion. No pneumothorax. Benign  calcified granuloma is present in the right lower lobe. Benign calcified  lymph node is seen in the right hilum. Minimal dependent atelectasis in  the lungs. No acute airspace disease.           ABDOMEN AND PELVIS: 1.2 x 4.1 x 1.4 cm left renal subcapsular hematoma  is seen. Serpiginous high density material consistent with active IV  contrast extravasation, is seen within the hematoma, consistent with  active bleeding. No discrete, qing laceration defect is identified on  this  examination. A small cyst projects anteriorly from the left lower  renal pole measuring 17 x 12 mm.     Right kidney, liver, gallbladder, spleen, pancreas and adrenal glands  are normal. Normal appendix. The bowel does not appear thickened or  inflamed. Urinary bladder, uterus, and rectum are normal. There is mild  left-sided retroperitoneal stranding extending from the level of the  kidney into the pelvis.     No acute osseous abnormality is seen within the abdomen or pelvis.             Impression:      1. Large left renal subcapsular hematoma with signs of active bleeding.  I notified Dr. Martinez in the emergency room prior to the time of this  dictation.  2. No acute findings in the chest or pelvis.     Electronically Signed By-Laura Garcia MD On:2/11/2022 12:34 PM  This report was finalized on [id number] by  Laura Garcia MD.    CT Abdomen Pelvis With Contrast [[id number]] Collected: 02/11/22 1229     Updated: 02/11/22 1236    Narrative:      CT CHEST W CONTRAST DIAGNOSTIC-, CT ABDOMEN PELVIS W CONTRAST-     Date of Exam: 2/11/2022 11:50 AM     Indication: Trauma severe left rib pain left upper quadrant pain.     Comparison: AP portable chest 11/23/2021. No prior abdominal/pelvic  imaging for comparison at this institution.     Technique: Serial and axial CT images of the chest were obtained  following the uneventful intravenous administration of 100 cc Isovue-370  contrast. Reconstructions in the coronal and sagittal planes were also  performed.  Automated exposure control and iterative reconstruction  methods were used.     FINDINGS:     CHEST: No acute osseous abnormalities are seen within the chest. No  displaced rib fracture is identified. There surgical changes of anterior  cervical spine fusion.     Heart size is normal. Mild coronary artery calcifications are present.  No pericardial effusion. No pleural effusion. No pneumothorax. Benign  calcified granuloma is present in the right lower lobe.  Benign calcified  lymph node is seen in the right hilum. Minimal dependent atelectasis in  the lungs. No acute airspace disease.           ABDOMEN AND PELVIS: 1.2 x 4.1 x 1.4 cm left renal subcapsular hematoma  is seen. Serpiginous high density material consistent with active IV  contrast extravasation, is seen within the hematoma, consistent with  active bleeding. No discrete, qing laceration defect is identified on  this examination. A small cyst projects anteriorly from the left lower  renal pole measuring 17 x 12 mm.     Right kidney, liver, gallbladder, spleen, pancreas and adrenal glands  are normal. Normal appendix. The bowel does not appear thickened or  inflamed. Urinary bladder, uterus, and rectum are normal. There is mild  left-sided retroperitoneal stranding extending from the level of the  kidney into the pelvis.     No acute osseous abnormality is seen within the abdomen or pelvis.             Impression:      1. Large left renal subcapsular hematoma with signs of active bleeding.  I notified Dr. Martinez in the emergency room prior to the time of this  dictation.  2. No acute findings in the chest or pelvis.     Electronically Signed By-Laura Garcia MD On:2/11/2022 12:34 PM  This report was finalized on [id number] by  Laura Garcia MD.           Lab Results (last 24 hours)     Procedure Component Value Units Date/Time    COVID PRE-OP / PRE-PROCEDURE SCREENING ORDER (NO ISOLATION) - Swab, Nasopharynx [[id number]]  (Normal) Collected: 02/11/22 1341    Specimen: Swab from Nasopharynx Updated: 02/11/22 1406    Narrative:      The following orders were created for panel order COVID PRE-OP / PRE-PROCEDURE SCREENING ORDER (NO ISOLATION) - Swab, Nasopharynx.  Procedure                               Abnormality         Status                     ---------                               -----------         ------                     COVID-19,CEPHEID/DINO,CO...[[id number]]  Normal              Final result                  Please view results for these tests on the individual orders.    COVID-19,CEPHEID/DINO,COR/GA/PAD/JOSH IN-HOUSE(OR EMERGENT/ADD-ON),NP SWAB IN TRANSPORT MEDIA 3-4 HR TAT, RT-PCR - Swab, Nasopharynx [[id number]]  (Normal) Collected: 02/11/22 1341    Specimen: Swab from Nasopharynx Updated: 02/11/22 1406     COVID19 Not Detected    Narrative:      Fact sheet for providers: https://www.fda.gov/media/744716/download     Fact sheet for patients: https://www.fda.gov/media/209299/download  Fact sheet for providers: https://www.fda.gov/media/553196/download    Fact sheet for patients: https://www.fda.gov/media/582888/download    Test performed by PCR.    aPTT [[id number]]  (Normal) Collected: 02/11/22 1341    Specimen: Blood Updated: 02/11/22 1401     PTT 24.2 seconds     Protime-INR [[id number]]  (Normal) Collected: 02/11/22 1341    Specimen: Blood Updated: 02/11/22 1401     Protime 10.9 Seconds      INR 0.98    Hemoglobin & Hematocrit, Blood [[id number]]  (Abnormal) Collected: 02/11/22 1341    Specimen: Blood Updated: 02/11/22 1348     Hemoglobin 10.7 g/dL      Hematocrit 31.5 %     Comprehensive Metabolic Panel [[id number]]  (Abnormal) Collected: 02/11/22 1040    Specimen: Blood Updated: 02/11/22 1108     Glucose 288 mg/dL      BUN 28 mg/dL      Creatinine 0.81 mg/dL      Sodium 138 mmol/L      Potassium 4.3 mmol/L      Chloride 103 mmol/L      CO2 21.0 mmol/L      Calcium 8.9 mg/dL      Total Protein 6.0 g/dL      Albumin 3.50 g/dL      ALT (SGPT) 14 U/L      AST (SGOT) 13 U/L      Alkaline Phosphatase 77 U/L      Total Bilirubin 0.3 mg/dL      eGFR Non African Amer 71 mL/min/1.73      Globulin 2.5 gm/dL      A/G Ratio 1.4 g/dL      BUN/Creatinine Ratio 34.6     Anion Gap 14.0 mmol/L     Narrative:      GFR Normal >60  Chronic Kidney Disease <60  Kidney Failure <15      Lipase [[id number]]  (Normal) Collected: 02/11/22 1040    Specimen: Blood Updated: 02/11/22 1108     Lipase 51 U/L     Amylase [[id number]]   (Normal) Collected: 02/11/22 1040    Specimen: Blood Updated: 02/11/22 1108     Amylase 56 U/L     CBC & Differential [[id number]]  (Abnormal) Collected: 02/11/22 1040    Specimen: Blood Updated: 02/11/22 1048    Narrative:      The following orders were created for panel order CBC & Differential.  Procedure                               Abnormality         Status                     ---------                               -----------         ------                     CBC Auto Differential[[id number]]        Abnormal            Final result                 Please view results for these tests on the individual orders.    CBC Auto Differential [[id number]]  (Abnormal) Collected: 02/11/22 1040    Specimen: Blood Updated: 02/11/22 1048     WBC 15.60 10*3/mm3      RBC 3.88 10*6/mm3      Hemoglobin 11.5 g/dL      Hematocrit 34.4 %      MCV 88.7 fL      MCH 29.6 pg      MCHC 33.4 g/dL      RDW 13.6 %      RDW-SD 42.9 fl      MPV 7.2 fL      Platelets 351 10*3/mm3      Neutrophil % 86.7 %      Lymphocyte % 9.5 %      Monocyte % 3.2 %      Eosinophil % 0.3 %      Basophil % 0.3 %      Neutrophils, Absolute 13.50 10*3/mm3      Lymphocytes, Absolute 1.50 10*3/mm3      Monocytes, Absolute 0.50 10*3/mm3      Eosinophils, Absolute 0.00 10*3/mm3      Basophils, Absolute 0.00 10*3/mm3      nRBC 0.1 /100 WBC            I reviewed the patient's new clinical results.    Assessment/Plan       * No active hospital problems. *    Fall - Pt reports a trip and fall. PT / OT when stable and pain improved.     Hematoma of the left kidney- post L renal angiograms. No evidence of active hemorrhage. Control pain .       T2DM with HTN/. HLD/ Neuropathy - continue home meds. SSI for glucose control.     Mood disorder- continue zoloft    I discussed the patients findings and my recommendations with patient.     Yesenia Bustamante, APRN  02/11/22  17:26 EST        Electronically signed by Aracelis King MD at 02/12/22 1866          Physician Progress  Notes (last 72 hours)      Campos Ng MD at 22 0619          Urology Progress Note    Patient Identification:  Name:  Medina Ann  Age:  66 y.o.  Sex:  female  :  1955  MRN:  [id number]    Subjective:  Feeling better and wants to go home    Objective    Reviewed CT from yesterday and left kidney and hematoma looks improved with no urinary extravasation    Hemoglobin is 8.8 after another unit of blood    Problem List:  Patient Active Problem List   Diagnosis   • Cerebrovascular accident (CVA) (HCC)   • Left-sided weakness   • Dizziness   • Type 2 diabetes mellitus with hyperglycemia (HCC)   • Peripheral neuropathy   • Hyperlipidemia   • Anxiety associated with depression   • Obesity, Class III, BMI 40-49.9 (morbid obesity) (HCC)   • Acute UTI (urinary tract infection)   • Fall   • Kidney hematoma   • Diabetes mellitus with coincident hypertension (HCC)   • Type 2 diabetes mellitus with hyperlipidemia (HCC)   • Diabetes mellitus with polyneuropathy (HCC)     Past Medical History:  Past Medical History:   Diagnosis Date   • Diabetes mellitus (HCC)    • Hyperlipidemia    • Hypertension    • TIA (transient ischemic attack)      Past Surgical History:  Past Surgical History:   Procedure Laterality Date   • CERVICAL FUSION     •  SECTION       Home Meds:  Medications Prior to Admission   Medication Sig Dispense Refill Last Dose   • acetaminophen (TYLENOL) 325 MG tablet Take 2 tablets by mouth Every 4 (Four) Hours As Needed for Mild Pain .      • amLODIPine (NORVASC) 10 MG tablet Take 1 tablet by mouth Daily.   2/10/2022 at Unknown time   • aspirin 325 MG tablet Take 1 tablet by mouth Daily.   2/10/2022 at Unknown time   • atorvastatin (LIPITOR) 40 MG tablet Take 1 tablet by mouth Every Night.   2/10/2022 at Unknown time   • dextrose (GLUTOSE) 40 % gel Take 15 g by mouth Every 15 (Fifteen) Minutes As Needed for Low Blood Sugar (Blood sugar less than 70).      • gabapentin (NEURONTIN) 300 MG  capsule Take 300 mg by mouth Daily With Breakfast & Lunch.   2/10/2022 at Unknown time   • gabapentin (NEURONTIN) 300 MG capsule Take 600 mg by mouth Every Night.   2/10/2022 at Unknown time   • glucagon (human recombinant) 1 mg/mL in sterile water (preservative free) injection injection Inject 1 mL under the skin into the appropriate area as directed Every 15 (Fifteen) Minutes As Needed (Blood Glucose Less Than 70).      • insulin glargine (LANTUS, SEMGLEE) 100 UNIT/ML injection Inject 15 Units under the skin into the appropriate area as directed Every 12 (Twelve) Hours.  12 2/11/2022 at Unknown time   • insulin lispro (ADMELOG) 100 UNIT/ML injection Inject 0-24 Units under the skin into the appropriate area as directed 3 (Three) Times a Day Before Meals.  12    • insulin lispro (ADMELOG) 100 UNIT/ML injection Inject 5 Units under the skin into the appropriate area as directed 3 (Three) Times a Day With Meals.  12    • lisinopril (PRINIVIL,ZESTRIL) 20 MG tablet Take 1 tablet by mouth Daily.   2/10/2022 at Unknown time   • melatonin 5 MG tablet tablet Take 1 tablet by mouth At Night As Needed (insomnia).      • metFORMIN ER (GLUCOPHAGE-XR) 500 MG 24 hr tablet Take 1,000 mg by mouth 2 (Two) Times a Day.   2/10/2022 at Unknown time   • sertraline (ZOLOFT) 100 MG tablet Take 150 mg by mouth Daily.   2/10/2022 at Unknown time   • vitamin B-12 (CYANOCOBALAMIN) 1000 MCG tablet Take 1 tablet by mouth Daily.   2/10/2022 at Unknown time     Current Meds:    Current Facility-Administered Medications:   •  Acetylcysteine capsule 1,200 mg, 1,200 mg, Oral, BID, Aracelis King MD, 1,200 mg at 02/13/22 2028  •  atorvastatin (LIPITOR) tablet 40 mg, 40 mg, Oral, Nightly, Yesenia Bustamante APRN, 40 mg at 02/13/22 2028  •  cefTRIAXone (ROCEPHIN) in SWFI 1 gram/10ml IV PUSH syringe, 1 g, Intravenous, Q24H, Aracelis King MD, 1 g at 02/13/22 1650  •  dextrose (D50W) (25 g/50 mL) IV injection 25 g, 25 g, Intravenous, Q15 Min PRN, Robby,  DAYANA Newell  •  dextrose (GLUTOSE) oral gel 15 g, 15 g, Oral, Q15 Min PRN, Yesenia Bustamante APRN  •  ferric gluconate (FERRLECIT)125 MG in sodium chloride 0.9 % 100 mL IVPB, 125 mg, Intravenous, Daily, Montserrat Watkins MD, Last Rate: 100 mL/hr at 02/13/22 1649, 125 mg at 02/13/22 1649  •  gabapentin (NEURONTIN) capsule 300 mg, 300 mg, Oral, Daily With Breakfast & Lunch, Yesenia Bustamante APRN, 300 mg at 02/13/22 1230  •  gabapentin (NEURONTIN) capsule 300 mg, 300 mg, Oral, Nightly, Montserrat Watkins MD, 300 mg at 02/13/22 2028  •  glucagon (human recombinant) (GLUCAGEN DIAGNOSTIC) 1 mg in sterile water (preservative free) 1 mL injection, 1 mg, Intramuscular, Q15 Min PRN, Yesenia Bustamante APRN  •  hydrALAZINE (APRESOLINE) injection 10 mg, 10 mg, Intravenous, Q6H PRN, Yesenia Bustamante, APRN, 10 mg at 02/14/22 0433  •  HYDROcodone-acetaminophen (NORCO) 7.5-325 MG per tablet 1 tablet, 1 tablet, Oral, Q6H PRN, Aracelis King MD, 1 tablet at 02/14/22 0433  •  insulin lispro (ADMELOG) injection 0-9 Units, 0-9 Units, Subcutaneous, TID AC, 2 Units at 02/13/22 1649 **AND** insulin lispro (ADMELOG) injection 0-9 Units, 0-9 Units, Subcutaneous, PRN, Yesenia Bustamante, APRN  •  lidocaine (LIDODERM) 5 % 2 patch, 2 patch, Transdermal, Q24H, Aracelis King MD, 2 patch at 02/13/22 1034  •  melatonin tablet 5 mg, 5 mg, Oral, Nightly PRN, Yesenia Bustamante, APRN, 5 mg at 02/13/22 2028  •  Morphine sulfate (PF) injection 4 mg, 4 mg, Intravenous, Q3H PRN, Aracelis King MD, 4 mg at 02/13/22 1406  •  ondansetron (ZOFRAN) tablet 4 mg, 4 mg, Oral, Q6H PRN **OR** ondansetron (ZOFRAN) injection 4 mg, 4 mg, Intravenous, Q6H PRN, Yesenia Bustamante APRN  •  pantoprazole (PROTONIX) EC tablet 40 mg, 40 mg, Oral, Q AM, Yesenia Bustamante APRN, 40 mg at 02/14/22 0432  •  sertraline (ZOLOFT) tablet 150 mg, 150 mg, Oral, Daily, Yesenia Bustamante APRN, 150 mg at 02/13/22 0905  •  [COMPLETED] Insert peripheral IV, , , Once **AND**  "sodium chloride 0.9 % flush 10 mL, 10 mL, Intravenous, PRN, Baljit Martinez MD, 10 mL at 22  •  sodium chloride 0.9 % flush 10 mL, 10 mL, Intravenous, Q12H, Yesenia Bustamante APRN, 10 mL at 22  •  sodium chloride 0.9 % flush 10 mL, 10 mL, Intravenous, PRN, Yesenia Bustamante APRN  •  sodium chloride 0.9 % infusion, 75 mL/hr, Intravenous, Continuous, Montserrat Watkins MD, Last Rate: 75 mL/hr at 22, 75 mL/hr at 22  Allergies:  Patient has no known allergies.    Review of Systems:  Negative 12 point system review except for what is in HPI    Objective:  tMax 24 hours:  Temp (24hrs), Av.5 °F (36.9 °C), Min:98.2 °F (36.8 °C), Max:99.2 °F (37.3 °C)    Vital Sign Ranges:  Temp:  [98.2 °F (36.8 °C)-99.2 °F (37.3 °C)] 98.3 °F (36.8 °C)  Heart Rate:  [84-90] 86  Resp:  [16-20] 18  BP: (116-184)/(62-87) 174/70  Intake and Output Last 3 Shifts:  I/O last 3 completed shifts:  In: 2797.5 [P.O.:1440; I.V.:1000; Blood:357.5]  Out: -     Exam:  /70 (BP Location: Right arm, Patient Position: Lying)   Pulse 86   Temp 98.3 °F (36.8 °C) (Oral)   Resp 18   Ht 177.8 cm (70\")   Wt (!) 141 kg (311 lb 15.2 oz)   SpO2 92%   BMI 44.76 kg/m²    General Appearance:    Alert, cooperative, no acute distress, general       appearance is normal   Head:    Normocephalic, without obvious abnormality, atraumatic   Eyes:            Pupils/irises normal. Exterior inspection conjunctivae       and lids normal.   Ears:    Normal external inspection   Nose:   Exterior inspection of nose is normal   Throat:   Lips, mucosa, and tongue normal   Neck:   No adenopathy, supple, symmetrical, trachea midline   Back:     No CVA tenderness   Lungs:     Respirations unlabored; normal effort, no audible     abnormality   CV:   Regular rhythm and normal rate, no edema   Abdomen:     No hernia, examination of the abdomen is normal with     no masses, tenderness, or distension    Female :  Sore in " left flank   Musculoskeletal:   Inspection of the nails and digits reveal no abnormalities   Skin:   Inspection normal, palpation normal   Neurologic/Psych:   Orientation normal; Mood/Affect normal     Data Review:  All labs (24hrs):    Lab Results (last 24 hours)     Procedure Component Value Units Date/Time    Comprehensive Metabolic Panel [[id number]]  (Abnormal) Collected: 02/14/22 0424    Specimen: Blood Updated: 02/14/22 0619     Glucose 149 mg/dL      BUN 34 mg/dL      Creatinine 0.87 mg/dL      Sodium 135 mmol/L      Potassium 4.5 mmol/L      Chloride 102 mmol/L      CO2 23.0 mmol/L      Calcium 8.7 mg/dL      Total Protein 5.8 g/dL      Albumin 3.30 g/dL      ALT (SGPT) 9 U/L      AST (SGOT) 12 U/L      Alkaline Phosphatase 72 U/L      Total Bilirubin 0.4 mg/dL      eGFR Non African Amer 66 mL/min/1.73      Globulin 2.5 gm/dL      A/G Ratio 1.3 g/dL      BUN/Creatinine Ratio 39.1     Anion Gap 10.0 mmol/L     Narrative:      GFR Normal >60  Chronic Kidney Disease <60  Kidney Failure <15      Phosphorus [[id number]]  (Normal) Collected: 02/14/22 0424    Specimen: Blood Updated: 02/14/22 0617     Phosphorus 3.3 mg/dL     Magnesium [[id number]]  (Normal) Collected: 02/14/22 0424    Specimen: Blood Updated: 02/14/22 0617     Magnesium 2.1 mg/dL     Calcium, Ionized [[id number]]  (Abnormal) Collected: 02/14/22 0424    Specimen: Blood Updated: 02/14/22 0611     Ionized Calcium 1.19 mmol/L     CBC Auto Differential [[id number]]  (Abnormal) Collected: 02/14/22 0424    Specimen: Blood Updated: 02/14/22 0552     WBC 8.80 10*3/mm3      RBC 2.81 10*6/mm3      Hemoglobin 8.8 g/dL      Hematocrit 25.4 %      MCV 90.5 fL      MCH 31.3 pg      MCHC 34.6 g/dL      RDW 13.5 %      RDW-SD 42.4 fl      MPV 7.8 fL      Platelets 206 10*3/mm3      Neutrophil % 75.1 %      Lymphocyte % 14.2 %      Monocyte % 8.5 %      Eosinophil % 1.7 %      Basophil % 0.5 %      Neutrophils, Absolute 6.60 10*3/mm3      Lymphocytes, Absolute 1.30  10*3/mm3      Monocytes, Absolute 0.70 10*3/mm3      Eosinophils, Absolute 0.20 10*3/mm3      Basophils, Absolute 0.00 10*3/mm3      nRBC 0.0 /100 WBC     Hemoglobin & Hematocrit, Blood [[id number]]  (Abnormal) Collected: 02/14/22 0041    Specimen: Blood Updated: 02/14/22 0110     Hemoglobin 8.0 g/dL      Hematocrit 23.6 %     Hemoglobin & Hematocrit, Blood [[id number]]  (Abnormal) Collected: 02/13/22 2045    Specimen: Blood Updated: 02/13/22 2120     Hemoglobin 8.5 g/dL      Hematocrit 24.7 %     POC Glucose Once [[id number]]  (Abnormal) Collected: 02/13/22 2046    Specimen: Blood Updated: 02/13/22 2047     Glucose 154 mg/dL      Comment: Serial Number: [id number]:  [id number]       Hemoglobin & Hematocrit, Blood [[id number]]  (Abnormal) Collected: 02/13/22 1714    Specimen: Blood Updated: 02/13/22 1824     Hemoglobin 8.8 g/dL      Hematocrit 26.0 %     POC Glucose Once [[id number]]  (Abnormal) Collected: 02/13/22 1635    Specimen: Blood Updated: 02/13/22 1639     Glucose 154 mg/dL      Comment: Serial Number: [id number]:  [id number]       TSH [[id number]]  (Abnormal) Collected: 02/13/22 1319    Specimen: Blood Updated: 02/13/22 1352     TSH 0.178 uIU/mL     Hemoglobin & Hematocrit, Blood [[id number]]  (Abnormal) Collected: 02/13/22 1319    Specimen: Blood Updated: 02/13/22 1330     Hemoglobin 8.2 g/dL      Hematocrit 23.8 %     Eosinophil Smear - Urine, Urine, Clean Catch [[id number]]  (Normal) Collected: 02/13/22 1036    Specimen: Urine, Clean Catch Updated: 02/13/22 1225     Eosinophil Smear 0 % EOS/100 Cells     POC Glucose Once [[id number]]  (Abnormal) Collected: 02/13/22 1144    Specimen: Blood Updated: 02/13/22 1144     Glucose 174 mg/dL      Comment: Serial Number: [id number]:  [id number]       Sodium, Urine, Random - Urine, Clean Catch [[id number]] Collected: 02/13/22 1036    Specimen: Urine, Clean Catch Updated: 02/13/22 1108     Sodium, Urine 33 mmol/L     Narrative:      Reference intervals for  random urine have not been established.  Clinical usage is dependent upon physician's interpretation in combination with other laboratory tests.       Urinalysis, Microscopic Only - Urine, Clean Catch [[id number]]  (Abnormal) Collected: 02/13/22 1037    Specimen: Urine, Clean Catch Updated: 02/13/22 1101     RBC, UA 3-5 /HPF      WBC, UA 3-5 /HPF      Bacteria, UA None Seen /HPF      Squamous Epithelial Cells, UA 0-2 /HPF      Hyaline Casts, UA 3-6 /LPF      Methodology Automated Microscopy    Urinalysis With Culture If Indicated - Urine, Clean Catch [[id number]]  (Abnormal) Collected: 02/13/22 1037    Specimen: Urine, Clean Catch Updated: 02/13/22 1101     Color, UA Yellow     Appearance, UA Clear     pH, UA <=5.0     Specific Gravity, UA 1.029     Glucose,  mg/dL (Trace)     Ketones, UA Negative     Bilirubin, UA Negative     Blood, UA Small (1+)     Protein,  mg/dL (2+)     Leuk Esterase, UA Trace     Nitrite, UA Negative     Urobilinogen, UA 0.2 E.U./dL    Hemoglobin & Hematocrit, Blood [[id number]]  (Abnormal) Collected: 02/13/22 0832    Specimen: Blood Updated: 02/13/22 0916     Hemoglobin 8.6 g/dL      Hematocrit 25.1 %     Iron [[id number]]  (Abnormal) Collected: 02/13/22 0141    Specimen: Blood Updated: 02/13/22 0915     Iron 22 mcg/dL     Uric Acid [[id number]]  (Abnormal) Collected: 02/13/22 0141    Specimen: Blood Updated: 02/13/22 0915     Uric Acid 7.2 mg/dL     CK [[id number]]  (Normal) Collected: 02/13/22 0141    Specimen: Blood Updated: 02/13/22 0915     Creatine Kinase 151 U/L     POC Glucose Once [[id number]]  (Abnormal) Collected: 02/13/22 0714    Specimen: Blood Updated: 02/13/22 0715     Glucose 179 mg/dL      Comment: Serial Number: [id number]:  [id number]           Radiology:   Imaging Results (Last 72 Hours)     Procedure Component Value Units Date/Time    CT Angiogram Abdomen Pelvis [[id number]] Collected: 02/13/22 1900     Updated: 02/13/22 1917    Narrative:         DATE OF  EXAM:  2/13/2022 3:19 PM     PROCEDURE:  CT ANGIOGRAM ABDOMEN PELVIS-     INDICATIONS: Left renal hematoma, follow-up; W19.XXXA-Unspecified fall,  initial encounter; S37.012A-Minor contusion of left kidney, initial  encounter     COMPARISON:  CT abdomen pelvis with contrast 2/11/2022, left renal  angiogram 2/11/2022     TECHNIQUE:  Routine transaxial slices were obtained through the abdomen without the  administration of intravenous contrast. Additional scanning of the  abdomen and pelvis followed by the intravenous administration of 100 mL  Isovue 370. Reconstructed coronal and sagittal images were also  obtained. In addition, a 3-D volume rendered image was obtained after  postprocessing. Automated exposure control and iterative reconstruction  methods were used.     FINDINGS:     VASCULAR:  The descending thoracic aorta is normal in caliber. The celiac axis is  patent. The superior mesenteric artery is patent. Patent solitary  bilateral renal arteries. There is redemonstration of a left renal  perinephric hematoma which measures up to 3.4 cm in thickness at the mid  left kidney, previously measured 4.1 cm. The hematoma measures 4 cm in  thickness at the lower pole left kidney, previously 5.3 cm. There is no  discrete area of active extravasation identified as was seen on the  prior study. The left kidney enhances normally.     The infrarenal aorta demonstrates mild to moderate atherosclerotic  calcifications without aneurysm. The inferior mesenteric artery is  patent. The right common and external iliac arteries are patent. The  left common and external iliac arteries are patent. Extensive  atherosclerotic calcification of the internal iliac arteries. The left  and right superficial femoral arteries and common femoral arteries are  patent bilaterally. There is no hematoma at the groin.     NON-VASCULAR:  Lung bases demonstrate mild bibasilar atelectasis with trace bilateral  effusions. No pericardial effusion.  The liver and spleen are normal in  size and contour. Normal adrenal glands. Pancreas without findings of  pancreatitis. Gallbladder present. No pericholecystic inflammation. Left  perinephric hematoma again noted as above with small amount of fluid  tracking into the left retroperitoneum. Uterus and adnexa without acute  abnormality. Negative for pneumoperitoneum. No bowel obstruction.  Negative for pneumatosis intestinalis. The portal vein, splenic vein,  superior mesenteric vein are patent. Small amount of fluid in the left  paracolic gutter. Contrast noted in the bladder.       Impression:      1. Left perinephric hematoma decreased in size from 2/11/2022. No site  of active extravasation. Small amount of hemorrhage tracking into left  retroperitoneum also decreased from prior study.  2. Trace bilateral pleural effusions with mild dependent bibasilar  airspace disease likely atelectasis.  3. Additional incidental findings above.     Electronically Signed By-Marcos Kay MD On:2/13/2022 7:15 PM  This report was finalized on [id number] by  Marcos Kay MD.    IR Angiogram Renal 1st Order [[id number]] Collected: 02/11/22 1607     Updated: 02/11/22 1617    Narrative:      DATE OF EXAM:  2/11/2022 2:47 PM     PROCEDURE:  IR ANGIOGRAM RENAL 1ST ORDER-     INDICATIONS:  Fall/trauma with large left perinephric or subcapsular hematoma and  evidence of active hemorrhage by CT scan. Interventional radiology  consulted for possible embolization of bleeding left renal artery  branch. W19.XXXA-Unspecified fall, initial encounter; S37.012A-Minor  contusion of left kidney, initial encounter     COMPARISON:  CT scan performed earlier same day.     FLUOROSCOPIC TIME:  3 minutes 9 seconds     PHYSICIAN MONITORED CONSCIOUS SEDATION TIME:  31 minutes     CONTRAST MEDIA:  45 cc Isovue-300     DESCRIPTION OF PROCEDURE:  The patient's medications were reviewed prior to the procedure. The  procedure, risks and alternatives were  discussed and informed written  consent was obtained. Maximal sterile barrier technique was utilized  throughout the procedure. The patient was placed supine on the  fluoroscopy table. The right groin was prepped and draped using maximum  sterile barrier technique. Lidocaine was used for local anesthesia.  Access was obtained of the right common femoral artery under direct  ultrasound visualization using a micropuncture set. A [employer] wire was  advanced into the abdominal aorta and a 5 Korean hemostatic sheath  placed. Using a 4 Korean Ashley's hook catheter, the left renal artery  was selectively catheterized. Multiple left renal angiograms were then  performed in multiple obliquities. No abnormal vessels are demonstrated.  No evidence of pseudoaneurysm or active extravasation is identified.  There is normal parenchymal enhancement of the left kidney. Incidental  note is made of moderate narrowing of the origin of the left renal  artery. This may be due to kinking from the rotational effects of the  large perinephric hematoma, however.     A right femoral angiogram was performed to confirm anatomy appropriate  for use a closure device. The site was reprepped sterilely. Additional  lidocaine was given subcutaneously. Gloves were changed. Hemostasis was  achieved using an Angio-Seal closure device. Proximal and distal pulses  were intact following placement.     The procedure was performed under moderate conscious sedation with  continuous monitoring using Versed and fentanyl. 31 minutes of physician  monitored sedation were provided.       Impression:      Multiple left renal angiograms demonstrate no evidence of continued  active hemorrhage, pseudoaneurysm or abnormal vessel. No embolization  was performed or required.     Electronically Signed By-Juice Willingham MD On:2/11/2022 4:15 PM  This report was finalized on [id number] by  Juice Willingham MD.    CT Chest With Contrast Diagnostic [[id number]] Collected:  02/11/22 1229     Updated: 02/11/22 1236    Narrative:      CT CHEST W CONTRAST DIAGNOSTIC-, CT ABDOMEN PELVIS W CONTRAST-     Date of Exam: 2/11/2022 11:50 AM     Indication: Trauma severe left rib pain left upper quadrant pain.     Comparison: [facility] portable chest 11/23/2021. No prior abdominal/pelvic  imaging for comparison at this institution.     Technique: Serial and axial CT images of the chest were obtained  following the uneventful intravenous administration of 100 cc Isovue-370  contrast. Reconstructions in the coronal and sagittal planes were also  performed.  Automated exposure control and iterative reconstruction  methods were used.     FINDINGS:     CHEST: No acute osseous abnormalities are seen within the chest. No  displaced rib fracture is identified. There surgical changes of anterior  cervical spine fusion.     Heart size is normal. Mild coronary artery calcifications are present.  No pericardial effusion. No pleural effusion. No pneumothorax. Benign  calcified granuloma is present in the right lower lobe. Benign calcified  lymph node is seen in the right hilum. Minimal dependent atelectasis in  the lungs. No acute airspace disease.           ABDOMEN AND PELVIS: 1.2 x 4.1 x 1.4 cm left renal subcapsular hematoma  is seen. Serpiginous high density material consistent with active IV  contrast extravasation, is seen within the hematoma, consistent with  active bleeding. No discrete, qing laceration defect is identified on  this examination. A small cyst projects anteriorly from the left lower  renal pole measuring 17 x 12 mm.     Right kidney, liver, gallbladder, spleen, pancreas and adrenal glands  are normal. Normal appendix. The bowel does not appear thickened or  inflamed. Urinary bladder, uterus, and rectum are normal. There is mild  left-sided retroperitoneal stranding extending from the level of the  kidney into the pelvis.     No acute osseous abnormality is seen within the abdomen or pelvis.              Impression:      1. Large left renal subcapsular hematoma with signs of active bleeding.  I notified Dr. Martinez in the emergency room prior to the time of this  dictation.  2. No acute findings in the chest or pelvis.     Electronically Signed By-Laura Garcia MD On:2/11/2022 12:34 PM  This report was finalized on [id number] by  Laura Garcia MD.    CT Abdomen Pelvis With Contrast [[id number]] Collected: 02/11/22 1229     Updated: 02/11/22 1236    Narrative:      CT CHEST W CONTRAST DIAGNOSTIC-, CT ABDOMEN PELVIS W CONTRAST-     Date of Exam: 2/11/2022 11:50 AM     Indication: Trauma severe left rib pain left upper quadrant pain.     Comparison: [facility] portable chest 11/23/2021. No prior abdominal/pelvic  imaging for comparison at this institution.     Technique: Serial and axial CT images of the chest were obtained  following the uneventful intravenous administration of 100 cc Isovue-370  contrast. Reconstructions in the coronal and sagittal planes were also  performed.  Automated exposure control and iterative reconstruction  methods were used.     FINDINGS:     CHEST: No acute osseous abnormalities are seen within the chest. No  displaced rib fracture is identified. There surgical changes of anterior  cervical spine fusion.     Heart size is normal. Mild coronary artery calcifications are present.  No pericardial effusion. No pleural effusion. No pneumothorax. Benign  calcified granuloma is present in the right lower lobe. Benign calcified  lymph node is seen in the right hilum. Minimal dependent atelectasis in  the lungs. No acute airspace disease.           ABDOMEN AND PELVIS: 1.2 x 4.1 x 1.4 cm left renal subcapsular hematoma  is seen. Serpiginous high density material consistent with active IV  contrast extravasation, is seen within the hematoma, consistent with  active bleeding. No discrete, qing laceration defect is identified on  this examination. A small cyst projects anteriorly from the left  "lower  renal pole measuring 17 x 12 mm.     Right kidney, liver, gallbladder, spleen, pancreas and adrenal glands  are normal. Normal appendix. The bowel does not appear thickened or  inflamed. Urinary bladder, uterus, and rectum are normal. There is mild  left-sided retroperitoneal stranding extending from the level of the  kidney into the pelvis.     No acute osseous abnormality is seen within the abdomen or pelvis.             Impression:      1. Large left renal subcapsular hematoma with signs of active bleeding.  I notified Dr. Martinez in the emergency room prior to the time of this  dictation.  2. No acute findings in the chest or pelvis.     Electronically Signed By-Laura Garcia MD On:2/11/2022 12:34 PM  This report was finalized on [id number] by  Laura Garcia MD.          Assessment:  Left renal hematoma which appears to be stabilized on CT scan and her hemoglobin is well.  IR did not see anything actively bleeding on admission.  Patient wants to go home and should take it easy and avoid blood thinners at this time.  We will see her in the office or sooner if needed      Campos Ng MD  2/14/2022  06:20 EST      Electronically signed by Campos Ng MD at 02/14/22 0622     Nehemiah Colón MD at 02/13/22 1302          [facility] Progress Note    Chief Complaint:  No change    Objective      Vital Signs  /76 (BP Location: Right arm, Patient Position: Lying)   Pulse 90   Temp 98.5 °F (36.9 °C) (Oral)   Resp 20   Ht 177.8 cm (70\")   Wt (!) 141 kg (311 lb 15.2 oz)   SpO2 94%   BMI 44.76 kg/m²        Results Review:      Lab Results (last 24 hours)     Procedure Component Value Units Date/Time    Eosinophil Smear - Urine, Urine, Clean Catch [[id number]]  (Normal) Collected: 02/13/22 1036    Specimen: Urine, Clean Catch Updated: 02/13/22 1225     Eosinophil Smear 0 % EOS/100 Cells     POC Glucose Once [[id number]]  (Abnormal) Collected: 02/13/22 1144    Specimen: Blood Updated: " 02/13/22 1144     Glucose 174 mg/dL      Comment: Serial Number: [id number]:  [id number]       Sodium, Urine, Random - Urine, Clean Catch [[id number]] Collected: 02/13/22 1036    Specimen: Urine, Clean Catch Updated: 02/13/22 1108     Sodium, Urine 33 mmol/L     Narrative:      Reference intervals for random urine have not been established.  Clinical usage is dependent upon physician's interpretation in combination with other laboratory tests.       Urinalysis, Microscopic Only - Urine, Clean Catch [[id number]]  (Abnormal) Collected: 02/13/22 1037    Specimen: Urine, Clean Catch Updated: 02/13/22 1101     RBC, UA 3-5 /HPF      WBC, UA 3-5 /HPF      Bacteria, UA None Seen /HPF      Squamous Epithelial Cells, UA 0-2 /HPF      Hyaline Casts, UA 3-6 /LPF      Methodology Automated Microscopy    Urinalysis With Culture If Indicated - Urine, Clean Catch [[id number]]  (Abnormal) Collected: 02/13/22 1037    Specimen: Urine, Clean Catch Updated: 02/13/22 1101     Color, UA Yellow     Appearance, UA Clear     pH, UA <=5.0     Specific Gravity, UA 1.029     Glucose,  mg/dL (Trace)     Ketones, UA Negative     Bilirubin, UA Negative     Blood, UA Small (1+)     Protein,  mg/dL (2+)     Leuk Esterase, UA Trace     Nitrite, UA Negative     Urobilinogen, UA 0.2 E.U./dL    Hemoglobin & Hematocrit, Blood [[id number]]  (Abnormal) Collected: 02/13/22 0832    Specimen: Blood Updated: 02/13/22 0916     Hemoglobin 8.6 g/dL      Hematocrit 25.1 %     Iron [[id number]]  (Abnormal) Collected: 02/13/22 0141    Specimen: Blood Updated: 02/13/22 0915     Iron 22 mcg/dL     Uric Acid [[id number]]  (Abnormal) Collected: 02/13/22 0141    Specimen: Blood Updated: 02/13/22 0915     Uric Acid 7.2 mg/dL     CK [[id number]]  (Normal) Collected: 02/13/22 0141    Specimen: Blood Updated: 02/13/22 0915     Creatine Kinase 151 U/L     POC Glucose Once [[id number]]  (Abnormal) Collected: 02/13/22 0714    Specimen: Blood Updated: 02/13/22 0715      Glucose 179 mg/dL      Comment: Serial Number: [id number]:  [id number]       Comprehensive Metabolic Panel [[id number]]  (Abnormal) Collected: 02/13/22 0141    Specimen: Blood Updated: 02/13/22 0252     Glucose 209 mg/dL      BUN 43 mg/dL      Creatinine 1.74 mg/dL      Sodium 138 mmol/L      Potassium 4.7 mmol/L      Chloride 102 mmol/L      CO2 24.0 mmol/L      Calcium 8.1 mg/dL      Total Protein 5.5 g/dL      Albumin 3.30 g/dL      ALT (SGPT) 9 U/L      AST (SGOT) 14 U/L      Alkaline Phosphatase 61 U/L      Total Bilirubin 0.3 mg/dL      eGFR Non African Amer 29 mL/min/1.73      Globulin 2.2 gm/dL      A/G Ratio 1.5 g/dL      BUN/Creatinine Ratio 24.7     Anion Gap 12.0 mmol/L     Narrative:      GFR Normal >60  Chronic Kidney Disease <60  Kidney Failure <15      CBC Auto Differential [[id number]]  (Abnormal) Collected: 02/13/22 0141    Specimen: Blood Updated: 02/13/22 0234     WBC 11.80 10*3/mm3      RBC 2.49 10*6/mm3      Hemoglobin 7.5 g/dL      Hematocrit 22.5 %      MCV 90.5 fL      MCH 30.3 pg      MCHC 33.5 g/dL      RDW 14.0 %      RDW-SD 44.6 fl      MPV 7.7 fL      Platelets 241 10*3/mm3      Neutrophil % 72.4 %      Lymphocyte % 18.7 %      Monocyte % 7.6 %      Eosinophil % 0.9 %      Basophil % 0.4 %      Neutrophils, Absolute 8.60 10*3/mm3      Lymphocytes, Absolute 2.20 10*3/mm3      Monocytes, Absolute 0.90 10*3/mm3      Eosinophils, Absolute 0.10 10*3/mm3      Basophils, Absolute 0.00 10*3/mm3      nRBC 0.0 /100 WBC     POC Glucose Once [[id number]]  (Abnormal) Collected: 02/13/22 0102    Specimen: Blood Updated: 02/13/22 0103     Glucose 183 mg/dL      Comment: Serial Number: [id number]:  [id number]       Hemoglobin & Hematocrit, Blood [[id number]]  (Abnormal) Collected: 02/12/22 2109    Specimen: Blood Updated: 02/12/22 2124     Hemoglobin 7.7 g/dL      Hematocrit 23.3 %     POC Glucose Once [[id number]]  (Abnormal) Collected: 02/12/22 1709    Specimen: Blood Updated: 02/12/22 1710      Glucose 191 mg/dL      Comment: Serial Number: [id number]:  [id number]           Imaging Results (Last 24 Hours)     ** No results found for the last 24 hours. **          Medication Review:   I have personally reviewed    Current Facility-Administered Medications:   •  Acetylcysteine capsule 1,200 mg, 1,200 mg, Oral, BID, Aracelis King MD, 1,200 mg at 02/13/22 0905  •  atorvastatin (LIPITOR) tablet 40 mg, 40 mg, Oral, Nightly, Yesenia Bustamante APRN, 40 mg at 02/12/22 2149  •  cefTRIAXone (ROCEPHIN) in [facility] 1 gram/10ml IV PUSH syringe, 1 g, Intravenous, Q24H, Aracelis King MD, 1 g at 02/12/22 1726  •  dextrose (D50W) (25 g/50 mL) IV injection 25 g, 25 g, Intravenous, Q15 Min PRN, Yesenia Bustamante, APRN  •  dextrose (GLUTOSE) oral gel 15 g, 15 g, Oral, Q15 Min PRN, Yesenia Bustamante APRN  •  gabapentin (NEURONTIN) capsule 300 mg, 300 mg, Oral, Daily With Breakfast & Lunch, Yesenia Bustamante APRN, 300 mg at 02/13/22 1230  •  gabapentin (NEURONTIN) capsule 600 mg, 600 mg, Oral, Nightly, Yesenia Bustamante APRN, 600 mg at 02/12/22 2148  •  glucagon (human recombinant) (GLUCAGEN DIAGNOSTIC) 1 mg in sterile water (preservative free) 1 mL injection, 1 mg, Intramuscular, Q15 Min PRN, Yesenia Bustamante APRN  •  hydrALAZINE (APRESOLINE) injection 10 mg, 10 mg, Intravenous, Q6H PRN, Yesenia Bustamante APRN  •  HYDROcodone-acetaminophen (NORCO) 7.5-325 MG per tablet 1 tablet, 1 tablet, Oral, Q6H PRN, Aracelis King MD, 1 tablet at 02/13/22 0905  •  insulin lispro (ADMELOG) injection 0-9 Units, 0-9 Units, Subcutaneous, TID AC, 2 Units at 02/13/22 1230 **AND** insulin lispro (ADMELOG) injection 0-9 Units, 0-9 Units, Subcutaneous, PRN, Yesenia Bustamante, APRN  •  lidocaine (LIDODERM) 5 % 2 patch, 2 patch, Transdermal, Q24H, Aracelis King MD, 2 patch at 02/13/22 1034  •  melatonin tablet 5 mg, 5 mg, Oral, Nightly PRN, Yesenia Bustamante, APRN  •  Morphine sulfate (PF) injection 4 mg, 4 mg, Intravenous, Q3H PRN, Aracelis King MD, 4 mg  at 02/12/22 1929  •  ondansetron (ZOFRAN) tablet 4 mg, 4 mg, Oral, Q6H PRN **OR** ondansetron (ZOFRAN) injection 4 mg, 4 mg, Intravenous, Q6H PRN, Wiliam Bustamanteie C, APRN  •  pantoprazole (PROTONIX) EC tablet 40 mg, 40 mg, Oral, Q AM, GranWiliam graciaie C, APRN, 40 mg at 02/13/22 0613  •  sertraline (ZOLOFT) tablet 150 mg, 150 mg, Oral, Daily, GranYesenia gracia C, APRN, 150 mg at 02/13/22 0905  •  sodium bicarbonate tablet 1,300 mg, 1,300 mg, Oral, Q4H, Montserrat Watkins MD, 1,300 mg at 02/13/22 1034  •  [COMPLETED] Insert peripheral IV, , , Once **AND** sodium chloride 0.9 % flush 10 mL, 10 mL, Intravenous, PRN, Baljit Martinez MD, 10 mL at 02/11/22 2017  •  sodium chloride 0.9 % flush 10 mL, 10 mL, Intravenous, Q12H, GranWiliam graciaie C, APRN, 10 mL at 02/13/22 0908  •  sodium chloride 0.9 % flush 10 mL, 10 mL, Intravenous, PRN, Granbasil, Yesenia C, APRN  •  sodium chloride 0.9 % infusion, 100 mL/hr, Intravenous, Continuous, Aracelis King MD, Last Rate: 100 mL/hr at 02/13/22 0909, 100 mL/hr at 02/13/22 0909    Allergies:    Patient has no known allergies.    Assessment:    Active Problems:  Patient Active Problem List   Diagnosis   • Cerebrovascular accident (CVA) ([facility])   • Left-sided weakness   • Dizziness   • Type 2 diabetes mellitus with hyperglycemia (HCC)   • Peripheral neuropathy   • Hyperlipidemia   • Anxiety associated with depression   • Obesity, Class III, BMI 40-49.9 (morbid obesity) ([facility])   • Acute UTI (urinary tract infection)   • Fall   • Kidney hematoma   • Diabetes mellitus with coincident hypertension (HCC)   • Type 2 diabetes mellitus with hyperlipidemia ([facility])   • Diabetes mellitus with polyneuropathy ([facility])           Plan:    Would cont to monitor Hgb  - may need to transfuse as needed  - no surgical intervention  - may need RUFUS Colón MD    2/13/2022  13:02 EST      Electronically signed by Nehemiah Colón MD at 02/13/22 1303     Aracelis King MD at 02/13/22 1006               LOS: 1  day   Patient Care Team:  Baljit Samaniego MD as PCP - General (Family Medicine)  Montserrat Watkins MD as Consulting Physician (Nephrology)    Subjective     Interval History: hg trending down, creatinine trending up    Patient Complaints: Left sided abdominal pain and flank pain unchanged    History taken from: patient    Review of Systems   Constitutional: Positive for activity change and appetite change. Negative for diaphoresis, fatigue and fever.   HENT: Negative for facial swelling.    Eyes: Negative for visual disturbance.   Respiratory: Negative for cough, shortness of breath, wheezing and stridor.    Cardiovascular: Negative for chest pain, palpitations and leg swelling.   Gastrointestinal: Positive for abdominal pain. Negative for constipation, diarrhea, nausea and vomiting.   Endocrine: Negative for polyuria.   Genitourinary: Positive for flank pain. Negative for difficulty urinating, enuresis, frequency, hematuria and urgency.   Musculoskeletal: Positive for arthralgias. Negative for back pain.   Skin: Negative for rash and wound.   Neurological: Negative for dizziness, numbness and headaches.   Psychiatric/Behavioral: Negative for confusion.           Objective     Vital Signs  Temp:  [98.2 °F (36.8 °C)-100.7 °F (38.2 °C)] 98.4 °F (36.9 °C)  Heart Rate:  [85-94] 90  Resp:  [16-18] 18  BP: (100-142)/(56-87) 142/74    Physical Exam:     General Appearance:    Alert, cooperative, in no acute distress, BMI 44   Head:    Normocephalic, without obvious abnormality, atraumatic   Eyes:            Lids and lashes normal, conjunctivae and sclerae normal, no   icterus, no pallor, corneas clear, PERRLA   Ears:    Ears appear intact with no abnormalities noted   Throat:   No oral lesions, no thrush, oral mucosa moist   Neck:   No adenopathy, supple, trachea midline, no thyromegaly, no   carotid bruit, no JVD   Lungs:     Clear to auscultation,respirations regular, even and                  unlabored     Heart:    Regular rhythm and normal rate, normal S1 and S2, no            murmur, no gallop, no rub, no click   Chest Wall:    No abnormalities observed   Abdomen:     Tender LUQ, LLQ, left flank; no rebound/guarding   Extremities:   Moves all extremities well, no edema, no cyanosis, no             Redness   Pulses:   Pulses palpable and equal bilaterally   Skin:   No bleeding, bruising or rash   Lymph nodes:   No palpable adenopathy   Neurologic:   Mild LUE and LLE weakness (chronic, residual from stroke in 11/2021.        Results Review:    Lab Results (last 24 hours)     Procedure Component Value Units Date/Time    Hemoglobin & Hematocrit, Blood [[id number]]  (Abnormal) Collected: 02/13/22 0832    Specimen: Blood Updated: 02/13/22 0916     Hemoglobin 8.6 g/dL      Hematocrit 25.1 %     Iron [[id number]]  (Abnormal) Collected: 02/13/22 0141    Specimen: Blood Updated: 02/13/22 0915     Iron 22 mcg/dL     Uric Acid [[id number]]  (Abnormal) Collected: 02/13/22 0141    Specimen: Blood Updated: 02/13/22 0915     Uric Acid 7.2 mg/dL     CK [[id number]]  (Normal) Collected: 02/13/22 0141    Specimen: Blood Updated: 02/13/22 0915     Creatine Kinase 151 U/L     POC Glucose Once [[id number]]  (Abnormal) Collected: 02/13/22 0714    Specimen: Blood Updated: 02/13/22 0715     Glucose 179 mg/dL      Comment: Serial Number: [id number]:  [id number]       Comprehensive Metabolic Panel [[id number]]  (Abnormal) Collected: 02/13/22 0141    Specimen: Blood Updated: 02/13/22 0252     Glucose 209 mg/dL      BUN 43 mg/dL      Creatinine 1.74 mg/dL      Sodium 138 mmol/L      Potassium 4.7 mmol/L      Chloride 102 mmol/L      CO2 24.0 mmol/L      Calcium 8.1 mg/dL      Total Protein 5.5 g/dL      Albumin 3.30 g/dL      ALT (SGPT) 9 U/L      AST (SGOT) 14 U/L      Alkaline Phosphatase 61 U/L      Total Bilirubin 0.3 mg/dL      eGFR Non African Amer 29 mL/min/1.73      Globulin 2.2 gm/dL      A/G Ratio 1.5 g/dL      BUN/Creatinine Ratio  24.7     Anion Gap 12.0 mmol/L     Narrative:      GFR Normal >60  Chronic Kidney Disease <60  Kidney Failure <15      CBC Auto Differential [[id number]]  (Abnormal) Collected: 02/13/22 0141    Specimen: Blood Updated: 02/13/22 0234     WBC 11.80 10*3/mm3      RBC 2.49 10*6/mm3      Hemoglobin 7.5 g/dL      Hematocrit 22.5 %      MCV 90.5 fL      MCH 30.3 pg      MCHC 33.5 g/dL      RDW 14.0 %      RDW-SD 44.6 fl      MPV 7.7 fL      Platelets 241 10*3/mm3      Neutrophil % 72.4 %      Lymphocyte % 18.7 %      Monocyte % 7.6 %      Eosinophil % 0.9 %      Basophil % 0.4 %      Neutrophils, Absolute 8.60 10*3/mm3      Lymphocytes, Absolute 2.20 10*3/mm3      Monocytes, Absolute 0.90 10*3/mm3      Eosinophils, Absolute 0.10 10*3/mm3      Basophils, Absolute 0.00 10*3/mm3      nRBC 0.0 /100 WBC     POC Glucose Once [[id number]]  (Abnormal) Collected: 02/13/22 0102    Specimen: Blood Updated: 02/13/22 0103     Glucose 183 mg/dL      Comment: Serial Number: [id number]:  [id number]       Hemoglobin & Hematocrit, Blood [[id number]]  (Abnormal) Collected: 02/12/22 2109    Specimen: Blood Updated: 02/12/22 2124     Hemoglobin 7.7 g/dL      Hematocrit 23.3 %     POC Glucose Once [[id number]]  (Abnormal) Collected: 02/12/22 1709    Specimen: Blood Updated: 02/12/22 1710     Glucose 191 mg/dL      Comment: Serial Number: [id number]:  [id number]       Hemoglobin & Hematocrit, Blood [[id number]]  (Abnormal) Collected: 02/12/22 1228    Specimen: Blood Updated: 02/12/22 1247     Hemoglobin 8.4 g/dL      Hematocrit 24.8 %     POC Glucose Once [[id number]]  (Abnormal) Collected: 02/12/22 1136    Specimen: Blood Updated: 02/12/22 1136     Glucose 159 mg/dL      Comment: Serial Number: [id number]:  [id number]       Hemoglobin & Hematocrit, Blood [[id number]]  (Abnormal) Collected: 02/12/22 1019    Specimen: Blood Updated: 02/12/22 1030     Hemoglobin 8.5 g/dL      Hematocrit 24.8 %            Imaging Results (Last 24 Hours)      ** No results found for the last 24 hours. **               I reviewed the patient's new clinical results.    Medication Review:   Scheduled Meds:Acetylcysteine, 1,200 mg, Oral, BID  atorvastatin, 40 mg, Oral, Nightly  cefTRIAXone, 1 g, Intravenous, Q24H  gabapentin, 300 mg, Oral, Daily With Breakfast & Lunch  gabapentin, 600 mg, Oral, Nightly  insulin lispro, 0-9 Units, Subcutaneous, TID AC  lidocaine, 2 patch, Transdermal, Q24H  pantoprazole, 40 mg, Oral, Q AM  sertraline, 150 mg, Oral, Daily  sodium bicarbonate, 1,300 mg, Oral, Q4H  sodium chloride, 10 mL, Intravenous, Q12H      Continuous Infusions:sodium chloride, 100 mL/hr, Last Rate: 100 mL/hr (02/13/22 0909)      PRN Meds:.dextrose  •  dextrose  •  glucagon (human recombinant)  •  hydrALAZINE  •  HYDROcodone-acetaminophen  •  insulin lispro **AND** insulin lispro  •  melatonin  •  Morphine  •  ondansetron **OR** ondansetron  •  [COMPLETED] Insert peripheral IV **AND** sodium chloride  •  sodium chloride     Assessment/Plan       Fall    Kidney hematoma    Diabetes mellitus with coincident hypertension (HCC)    Type 2 diabetes mellitus with hyperlipidemia (HCC)    Diabetes mellitus with polyneuropathy (HCC)  Anemia - related to hematoma; transfused one unit early this am; continue serial h/h.  OUMOU - GFR was 97 at last admission; change may be related to ATN (marked fluctuation in BP from 200/100 to 94/58 after admission) or obstruction related to hematoma - need to re-image due to continued drop in Hg - premedicating for contrasted study with Mucomyst and sodium bicarb.  Dr. Watkins consulted.  Low grade fever - on Rocephin prophylactically for UTI/pyelo in setting of significant renal hematoma    SCd's for dvt prophy  Pantoprazole for stress ulcer prophy    Plan for disposition:TBD - PT eval on hold    Aracelis King MD  02/13/22  10:06 EST          Electronically signed by Aracelis King MD at 02/13/22 1014     Aracelis King MD at 02/12/22 1209                LOS: 0 days   Patient Care Team:  Baljit Samaniego MD as PCP - General (Family Medicine)    Subjective     Interval History:Continues to have severe left flank and lower rib pain; no bleeding vessel found on angiogram last night    Patient Complaints: See above    History taken from: patient    Review of Systems   Constitutional: Positive for activity change, appetite change and fatigue. Negative for chills, diaphoresis and fever.   HENT: Negative for facial swelling.    Eyes: Negative for visual disturbance.   Respiratory: Negative for cough, shortness of breath, wheezing and stridor.    Cardiovascular: Negative for chest pain, palpitations and leg swelling.   Gastrointestinal: Positive for abdominal pain. Negative for constipation, diarrhea, nausea and vomiting.   Endocrine: Negative for polyuria.   Genitourinary: Positive for hematuria. Negative for dysuria.   Musculoskeletal: Positive for back pain, gait problem and myalgias. Negative for arthralgias.   Skin: Positive for wound. Negative for rash.   Neurological: Negative for dizziness, syncope, speech difficulty, weakness, numbness and headaches.   Psychiatric/Behavioral: Negative for confusion.           Objective     Vital Signs  Temp:  [96.6 °F (35.9 °C)-99.1 °F (37.3 °C)] 99.1 °F (37.3 °C)  Heart Rate:  [] 89  Resp:  [15-20] 15  BP: ()/() 93/57    Physical Exam:     General Appearance:    Alert, cooperative, in no acute distress, BMI 44   Head:    Normocephalic, without obvious abnormality, atraumatic   Eyes:            Lids and lashes normal, conjunctivae and sclerae normal, no   icterus, no pallor, corneas clear, PERRLA   Ears:    Ears appear intact with no abnormalities noted   Throat:   No oral lesions, no thrush, oral mucosa moist   Neck:   No adenopathy, supple, trachea midline, no thyromegaly, no   carotid bruit, no JVD   Lungs:     Clear to auscultation,respirations regular, even and                  unlabored    Heart:    Regular  rhythm and normal rate, normal S1 and S2, no            murmur, no gallop, no rub, no click   Chest Wall:    Tender right posterior ribs   Abdomen:     Normal bowel sounds, no masses, no organomegaly, soft        Non-tender non-distended, no guarding,   Extremities:  Right femoral - puncture site WNL.; no edema   Pulses:   Pulses palpable and equal bilaterally   Skin:   No bleeding, bruising or rash   Lymph nodes:   No palpable adenopathy   Neurologic:   Cranial nerves 2 - 12 grossly intact, sensation intact, DTR       present and equal bilaterally        Results Review:    Lab Results (last 24 hours)     Procedure Component Value Units Date/Time    POC Glucose Once [[id number]]  (Abnormal) Collected: 02/12/22 1136    Specimen: Blood Updated: 02/12/22 1136     Glucose 159 mg/dL      Comment: Serial Number: [id number]:  [id number]       Hemoglobin & Hematocrit, Blood [[id number]]  (Abnormal) Collected: 02/12/22 1019    Specimen: Blood Updated: 02/12/22 1030     Hemoglobin 8.5 g/dL      Hematocrit 24.8 %     Hemoglobin & Hematocrit, Blood [[id number]]  (Abnormal) Collected: 02/12/22 0811    Specimen: Blood Updated: 02/12/22 0821     Hemoglobin 8.6 g/dL      Hematocrit 25.3 %     POC Glucose Once [[id number]]  (Abnormal) Collected: 02/12/22 0704    Specimen: Blood Updated: 02/12/22 0705     Glucose 180 mg/dL      Comment: Serial Number: [id number]:  [id number]       Comprehensive Metabolic Panel [[id number]]  (Abnormal) Collected: 02/12/22 0536    Specimen: Blood Updated: 02/12/22 0613     Glucose 179 mg/dL      BUN 38 mg/dL      Creatinine 1.60 mg/dL      Sodium 136 mmol/L      Potassium 5.4 mmol/L      Chloride 102 mmol/L      CO2 24.0 mmol/L      Calcium 8.5 mg/dL      Total Protein 5.2 g/dL      Albumin 3.00 g/dL      ALT (SGPT) 10 U/L      AST (SGOT) 10 U/L      Alkaline Phosphatase 61 U/L      Total Bilirubin 0.2 mg/dL      eGFR Non African Amer 32 mL/min/1.73      Globulin 2.2 gm/dL      A/G Ratio 1.4  g/dL      BUN/Creatinine Ratio 23.8     Anion Gap 10.0 mmol/L     Narrative:      GFR Normal >60  Chronic Kidney Disease <60  Kidney Failure <15      CBC Auto Differential [[id number]]  (Abnormal) Collected: 02/12/22 0536    Specimen: Blood Updated: 02/12/22 0547     WBC 12.20 10*3/mm3      RBC 2.88 10*6/mm3      Hemoglobin 8.7 g/dL      Hematocrit 26.0 %      MCV 90.3 fL      MCH 30.3 pg      MCHC 33.5 g/dL      RDW 14.0 %      RDW-SD 44.6 fl      MPV 7.4 fL      Platelets 286 10*3/mm3      Neutrophil % 77.3 %      Lymphocyte % 14.4 %      Monocyte % 7.7 %      Eosinophil % 0.0 %      Basophil % 0.6 %      Neutrophils, Absolute 9.40 10*3/mm3      Lymphocytes, Absolute 1.80 10*3/mm3      Monocytes, Absolute 0.90 10*3/mm3      Eosinophils, Absolute 0.00 10*3/mm3      Basophils, Absolute 0.10 10*3/mm3      nRBC 0.0 /100 WBC     Hemoglobin & Hematocrit, Blood [[id number]]  (Abnormal) Collected: 02/12/22 0024    Specimen: Blood Updated: 02/12/22 0104     Hemoglobin 9.8 g/dL      Hematocrit 29.0 %     Urinalysis, Microscopic Only - Urine, Clean Catch [[id number]]  (Abnormal) Collected: 02/11/22 2318    Specimen: Urine, Clean Catch Updated: 02/11/22 2344     RBC, UA 0-2 /HPF      WBC, UA 6-12 /HPF      Bacteria, UA Trace /HPF      Squamous Epithelial Cells, UA 7-12 /HPF      Hyaline Casts, UA None Seen /LPF      Methodology Manual Light Microscopy    Urinalysis With Microscopic If Indicated (No Culture) - Urine, Clean Catch [[id number]]  (Abnormal) Collected: 02/11/22 2318    Specimen: Urine, Clean Catch Updated: 02/11/22 2334     Color, UA Yellow     Appearance, UA Clear     pH, UA <=5.0     Specific Gravity, UA 1.076     Glucose,  mg/dL (1+)     Ketones, UA Trace     Bilirubin, UA Negative     Blood, UA Large (3+)     Protein, UA >=300 mg/dL (3+)     Leuk Esterase, UA Trace     Nitrite, UA Negative     Urobilinogen, UA 0.2 E.U./dL    Hemoglobin & Hematocrit, Blood [[id number]]  (Abnormal) Collected: 02/11/22 2053     Specimen: Blood Updated: 02/11/22 2150     Hemoglobin 10.6 g/dL      Hematocrit 31.9 %     POC Glucose Once [[id number]]  (Abnormal) Collected: 02/11/22 1939    Specimen: Blood Updated: 02/11/22 1940     Glucose 305 mg/dL      Comment: Serial Number: [id number]:  [id number]       CK [[id number]]  (Normal) Collected: 02/11/22 1852    Specimen: Blood Updated: 02/11/22 1924     Creatine Kinase 122 U/L     Hemoglobin & Hematocrit, Blood [[id number]]  (Abnormal) Collected: 02/11/22 1852    Specimen: Blood Updated: 02/11/22 1902     Hemoglobin 10.7 g/dL      Hematocrit 31.9 %     Hemoglobin & Hematocrit, Blood [[id number]]  (Abnormal) Collected: 02/11/22 1854    Specimen: Blood Updated: 02/11/22 1901     Hemoglobin 11.0 g/dL      Hematocrit 33.1 %     POC Glucose Once [[id number]]  (Abnormal) Collected: 02/11/22 1749    Specimen: Blood Updated: 02/11/22 1750     Glucose 346 mg/dL      Comment: Serial Number: [id number]:  [id number]       COVID PRE-OP / PRE-PROCEDURE SCREENING ORDER (NO ISOLATION) - Swab, Nasopharynx [[id number]]  (Normal) Collected: 02/11/22 1341    Specimen: Swab from Nasopharynx Updated: 02/11/22 1406    Narrative:      The following orders were created for panel order COVID PRE-OP / PRE-PROCEDURE SCREENING ORDER (NO ISOLATION) - Swab, Nasopharynx.  Procedure                               Abnormality         Status                     ---------                               -----------         ------                     COVID-19,CEPHEID/DINO,CO...[[id number]]  Normal              Final result                 Please view results for these tests on the individual orders.    COVID-19,CEPHEID/DINO,COR/GA/PAD/JOSH IN-HOUSE(OR EMERGENT/ADD-ON),NP SWAB IN TRANSPORT MEDIA 3-4 HR TAT, RT-PCR - Swab, Nasopharynx [[id number]]  (Normal) Collected: 02/11/22 1341    Specimen: Swab from Nasopharynx Updated: 02/11/22 1406     COVID19 Not Detected    Narrative:      Fact sheet for providers:  https://www.fda.gov/media/876043/download     Fact sheet for patients: https://www.fda.gov/media/207801/download  Fact sheet for providers: https://www.fda.gov/media/601164/download    Fact sheet for patients: https://www.fda.gov/media/103685/download    Test performed by PCR.    aPTT [[id number]]  (Normal) Collected: 02/11/22 1341    Specimen: Blood Updated: 02/11/22 1401     PTT 24.2 seconds     Protime-INR [[id number]]  (Normal) Collected: 02/11/22 1341    Specimen: Blood Updated: 02/11/22 1401     Protime 10.9 Seconds      INR 0.98    Hemoglobin & Hematocrit, Blood [[id number]]  (Abnormal) Collected: 02/11/22 1341    Specimen: Blood Updated: 02/11/22 1348     Hemoglobin 10.7 g/dL      Hematocrit 31.5 %            Imaging Results (Last 24 Hours)     Procedure Component Value Units Date/Time    IR Angiogram Renal 1st Order [[id number]] Collected: 02/11/22 1607     Updated: 02/11/22 1617    Narrative:      DATE OF EXAM:  2/11/2022 2:47 PM     PROCEDURE:  IR ANGIOGRAM RENAL 1ST ORDER-     INDICATIONS:  Fall/trauma with large left perinephric or subcapsular hematoma and  evidence of active hemorrhage by CT scan. Interventional radiology  consulted for possible embolization of bleeding left renal artery  branch. W19.XXXA-Unspecified fall, initial encounter; S37.012A-Minor  contusion of left kidney, initial encounter     COMPARISON:  CT scan performed earlier same day.     FLUOROSCOPIC TIME:  3 minutes 9 seconds     PHYSICIAN MONITORED CONSCIOUS SEDATION TIME:  31 minutes     CONTRAST MEDIA:  45 cc Isovue-300     DESCRIPTION OF PROCEDURE:  The patient's medications were reviewed prior to the procedure. The  procedure, risks and alternatives were discussed and informed written  consent was obtained. Maximal sterile barrier technique was utilized  throughout the procedure. The patient was placed supine on the  fluoroscopy table. The right groin was prepped and draped using maximum  sterile barrier technique. Lidocaine was  used for local anesthesia.  Access was obtained of the right common femoral artery under direct  ultrasound visualization using a micropuncture set. A Bentson wire was  advanced into the abdominal aorta and a 5 [place] hemostatic sheath  placed. Using a 4 [place] Ashley's hook catheter, the left renal artery  was selectively catheterized. Multiple left renal angiograms were then  performed in multiple obliquities. No abnormal vessels are demonstrated.  No evidence of pseudoaneurysm or active extravasation is identified.  There is normal parenchymal enhancement of the left kidney. Incidental  note is made of moderate narrowing of the origin of the left renal  artery. This may be due to kinking from the rotational effects of the  large perinephric hematoma, however.     A right femoral angiogram was performed to confirm anatomy appropriate  for use a closure device. The site was reprepped sterilely. Additional  lidocaine was given subcutaneously. Gloves were changed. Hemostasis was  achieved using an Angio-Seal closure device. Proximal and distal pulses  were intact following placement.     The procedure was performed under moderate conscious sedation with  continuous monitoring using Versed and fentanyl. 31 minutes of physician  monitored sedation were provided.       Impression:      Multiple left renal angiograms demonstrate no evidence of continued  active hemorrhage, pseudoaneurysm or abnormal vessel. No embolization  was performed or required.     Electronically Signed By-Juice Willingham MD On:2/11/2022 4:15 PM  This report was finalized on [id number] by  Juice Willingham MD.    CT Chest With Contrast Diagnostic [[id number]] Collected: 02/11/22 1229     Updated: 02/11/22 1236    Narrative:      CT CHEST W CONTRAST DIAGNOSTIC-, CT ABDOMEN PELVIS W CONTRAST-     Date of Exam: 2/11/2022 11:50 AM     Indication: Trauma severe left rib pain left upper quadrant pain.     Comparison: [facility] portable chest 11/23/2021. No prior  abdominal/pelvic  imaging for comparison at this institution.     Technique: Serial and axial CT images of the chest were obtained  following the uneventful intravenous administration of 100 cc Isovue-370  contrast. Reconstructions in the coronal and sagittal planes were also  performed.  Automated exposure control and iterative reconstruction  methods were used.     FINDINGS:     CHEST: No acute osseous abnormalities are seen within the chest. No  displaced rib fracture is identified. There surgical changes of anterior  cervical spine fusion.     Heart size is normal. Mild coronary artery calcifications are present.  No pericardial effusion. No pleural effusion. No pneumothorax. Benign  calcified granuloma is present in the right lower lobe. Benign calcified  lymph node is seen in the right hilum. Minimal dependent atelectasis in  the lungs. No acute airspace disease.           ABDOMEN AND PELVIS: 1.2 x 4.1 x 1.4 cm left renal subcapsular hematoma  is seen. Serpiginous high density material consistent with active IV  contrast extravasation, is seen within the hematoma, consistent with  active bleeding. No discrete, qing laceration defect is identified on  this examination. A small cyst projects anteriorly from the left lower  renal pole measuring 17 x 12 mm.     Right kidney, liver, gallbladder, spleen, pancreas and adrenal glands  are normal. Normal appendix. The bowel does not appear thickened or  inflamed. Urinary bladder, uterus, and rectum are normal. There is mild  left-sided retroperitoneal stranding extending from the level of the  kidney into the pelvis.     No acute osseous abnormality is seen within the abdomen or pelvis.             Impression:      1. Large left renal subcapsular hematoma with signs of active bleeding.  I notified Dr. Martinez in the emergency room prior to the time of this  dictation.  2. No acute findings in the chest or pelvis.     Electronically Signed By-Laura Garcia MD  On:2/11/2022 12:34 PM  This report was finalized on [id number] by  Laura Garcia MD.    CT Abdomen Pelvis With Contrast [[id number]] Collected: 02/11/22 1229     Updated: 02/11/22 1236    Narrative:      CT CHEST W CONTRAST DIAGNOSTIC-, CT ABDOMEN PELVIS W CONTRAST-     Date of Exam: 2/11/2022 11:50 AM     Indication: Trauma severe left rib pain left upper quadrant pain.     Comparison: [facility] portable chest 11/23/2021. No prior abdominal/pelvic  imaging for comparison at this institution.     Technique: Serial and axial CT images of the chest were obtained  following the uneventful intravenous administration of 100 cc Isovue-370  contrast. Reconstructions in the coronal and sagittal planes were also  performed.  Automated exposure control and iterative reconstruction  methods were used.     FINDINGS:     CHEST: No acute osseous abnormalities are seen within the chest. No  displaced rib fracture is identified. There surgical changes of anterior  cervical spine fusion.     Heart size is normal. Mild coronary artery calcifications are present.  No pericardial effusion. No pleural effusion. No pneumothorax. Benign  calcified granuloma is present in the right lower lobe. Benign calcified  lymph node is seen in the right hilum. Minimal dependent atelectasis in  the lungs. No acute airspace disease.           ABDOMEN AND PELVIS: 1.2 x 4.1 x 1.4 cm left renal subcapsular hematoma  is seen. Serpiginous high density material consistent with active IV  contrast extravasation, is seen within the hematoma, consistent with  active bleeding. No discrete, qing laceration defect is identified on  this examination. A small cyst projects anteriorly from the left lower  renal pole measuring 17 x 12 mm.     Right kidney, liver, gallbladder, spleen, pancreas and adrenal glands  are normal. Normal appendix. The bowel does not appear thickened or  inflamed. Urinary bladder, uterus, and rectum are normal. There is mild  left-sided  retroperitoneal stranding extending from the level of the  kidney into the pelvis.     No acute osseous abnormality is seen within the abdomen or pelvis.             Impression:      1. Large left renal subcapsular hematoma with signs of active bleeding.  I notified Dr. Martinez in the emergency room prior to the time of this  dictation.  2. No acute findings in the chest or pelvis.     Electronically Signed By-Laura Garcia MD On:2/11/2022 12:34 PM  This report was finalized on [id number] by  Laura Garcia MD.               I reviewed the patient's new clinical results.    Medication Review:   Scheduled Meds:amLODIPine, 10 mg, Oral, Q24H  atorvastatin, 40 mg, Oral, Nightly  cefTRIAXone, 1 g, Intravenous, Q24H  gabapentin, 300 mg, Oral, Daily With Breakfast & Lunch  gabapentin, 600 mg, Oral, Nightly  insulin lispro, 0-9 Units, Subcutaneous, TID AC  lidocaine, 2 patch, Transdermal, Q24H  lisinopril, 20 mg, Oral, Daily  metFORMIN ER, 1,000 mg, Oral, BID  pantoprazole, 40 mg, Oral, Q AM  sertraline, 150 mg, Oral, Daily  sodium chloride, 10 mL, Intravenous, Q12H      Continuous Infusions:   PRN Meds:.dextrose  •  dextrose  •  glucagon (human recombinant)  •  hydrALAZINE  •  HYDROcodone-acetaminophen  •  insulin lispro **AND** insulin lispro  •  melatonin  •  Morphine  •  ondansetron **OR** ondansetron  •  [COMPLETED] Insert peripheral IV **AND** sodium chloride  •  sodium chloride     Assessment/Plan       Fall    Kidney hematoma    Diabetes mellitus with coincident hypertension (HCC)    Type 2 diabetes mellitus with hyperlipidemia (HCC)    Diabetes mellitus with polyneuropathy (HCC)  Blood loss anemia - continue q 4 hour h/h; transfuse below 8  Elevated creatinine - hydrate; repeat in am; stopping metformin  Rib contusion - Lidocaine  Incentive spirometer, increase activity level  Hypotension - holding home BP meds  DM-II with vascular disease - holding metformin; use SSI        Plan for disposition:HAYLEY Hsu  "MD Christine  02/12/22  12:02 EST          Electronically signed by Aracelis King MD at 02/12/22 2939     Nehemiah Colón MD at 02/12/22 0929          [facility] Progress Note    Chief Complaint:  I want to go home    Objective      Vital Signs  BP 93/57 (BP Location: Left arm, Patient Position: Lying)   Pulse 89   Temp 99.1 °F (37.3 °C) (Oral)   Resp 15   Ht 177.8 cm (70\")   Wt (!) 141 kg (310 lb)   SpO2 98%   BMI 44.48 kg/m²          Results Review:      Lab Results (last 24 hours)     Procedure Component Value Units Date/Time    Hemoglobin & Hematocrit, Blood [[id number]]  (Abnormal) Collected: 02/12/22 0811    Specimen: Blood Updated: 02/12/22 0821     Hemoglobin 8.6 g/dL      Hematocrit 25.3 %     POC Glucose Once [[id number]]  (Abnormal) Collected: 02/12/22 0704    Specimen: Blood Updated: 02/12/22 0705     Glucose 180 mg/dL      Comment: Serial Number: [id number]:  [id number]       Comprehensive Metabolic Panel [[id number]]  (Abnormal) Collected: 02/12/22 0536    Specimen: Blood Updated: 02/12/22 0613     Glucose 179 mg/dL      BUN 38 mg/dL      Creatinine 1.60 mg/dL      Sodium 136 mmol/L      Potassium 5.4 mmol/L      Chloride 102 mmol/L      CO2 24.0 mmol/L      Calcium 8.5 mg/dL      Total Protein 5.2 g/dL      Albumin 3.00 g/dL      ALT (SGPT) 10 U/L      AST (SGOT) 10 U/L      Alkaline Phosphatase 61 U/L      Total Bilirubin 0.2 mg/dL      eGFR Non African Amer 32 mL/min/1.73      Globulin 2.2 gm/dL      A/G Ratio 1.4 g/dL      BUN/Creatinine Ratio 23.8     Anion Gap 10.0 mmol/L     Narrative:      GFR Normal >60  Chronic Kidney Disease <60  Kidney Failure <15      CBC Auto Differential [[id number]]  (Abnormal) Collected: 02/12/22 0536    Specimen: Blood Updated: 02/12/22 0547     WBC 12.20 10*3/mm3      RBC 2.88 10*6/mm3      Hemoglobin 8.7 g/dL      Hematocrit 26.0 %      MCV 90.3 fL      MCH 30.3 pg      MCHC 33.5 g/dL      RDW 14.0 %      RDW-SD 44.6 fl      MPV 7.4 fL      Platelets 286 " 10*3/mm3      Neutrophil % 77.3 %      Lymphocyte % 14.4 %      Monocyte % 7.7 %      Eosinophil % 0.0 %      Basophil % 0.6 %      Neutrophils, Absolute 9.40 10*3/mm3      Lymphocytes, Absolute 1.80 10*3/mm3      Monocytes, Absolute 0.90 10*3/mm3      Eosinophils, Absolute 0.00 10*3/mm3      Basophils, Absolute 0.10 10*3/mm3      nRBC 0.0 /100 WBC     Hemoglobin & Hematocrit, Blood [[id number]]  (Abnormal) Collected: 02/12/22 0024    Specimen: Blood Updated: 02/12/22 0104     Hemoglobin 9.8 g/dL      Hematocrit 29.0 %     Urinalysis, Microscopic Only - Urine, Clean Catch [[id number]]  (Abnormal) Collected: 02/11/22 2318    Specimen: Urine, Clean Catch Updated: 02/11/22 2344     RBC, UA 0-2 /HPF      WBC, UA 6-12 /HPF      Bacteria, UA Trace /HPF      Squamous Epithelial Cells, UA 7-12 /HPF      Hyaline Casts, UA None Seen /LPF      Methodology Manual Light Microscopy    Urinalysis With Microscopic If Indicated (No Culture) - Urine, Clean Catch [[id number]]  (Abnormal) Collected: 02/11/22 2318    Specimen: Urine, Clean Catch Updated: 02/11/22 2334     Color, UA Yellow     Appearance, UA Clear     pH, UA <=5.0     Specific Gravity, UA 1.076     Glucose,  mg/dL (1+)     Ketones, UA Trace     Bilirubin, UA Negative     Blood, UA Large (3+)     Protein, UA >=300 mg/dL (3+)     Leuk Esterase, UA Trace     Nitrite, UA Negative     Urobilinogen, UA 0.2 E.U./dL    Hemoglobin & Hematocrit, Blood [[id number]]  (Abnormal) Collected: 02/11/22 2053    Specimen: Blood Updated: 02/11/22 2150     Hemoglobin 10.6 g/dL      Hematocrit 31.9 %     POC Glucose Once [[id number]]  (Abnormal) Collected: 02/11/22 1939    Specimen: Blood Updated: 02/11/22 1940     Glucose 305 mg/dL      Comment: Serial Number: [id number]:  [id number]       CK [[id number]]  (Normal) Collected: 02/11/22 1852    Specimen: Blood Updated: 02/11/22 1924     Creatine Kinase 122 U/L     Hemoglobin & Hematocrit, Blood [[id number]]  (Abnormal) Collected: 02/11/22  1852    Specimen: Blood Updated: 02/11/22 1902     Hemoglobin 10.7 g/dL      Hematocrit 31.9 %     Hemoglobin & Hematocrit, Blood [[id number]]  (Abnormal) Collected: 02/11/22 1854    Specimen: Blood Updated: 02/11/22 1901     Hemoglobin 11.0 g/dL      Hematocrit 33.1 %     POC Glucose Once [[id number]]  (Abnormal) Collected: 02/11/22 1749    Specimen: Blood Updated: 02/11/22 1750     Glucose 346 mg/dL      Comment: Serial Number: [id number]:  [id number]       COVID PRE-OP / PRE-PROCEDURE SCREENING ORDER (NO ISOLATION) - Swab, Nasopharynx [[id number]]  (Normal) Collected: 02/11/22 1341    Specimen: Swab from Nasopharynx Updated: 02/11/22 1406    Narrative:      The following orders were created for panel order COVID PRE-OP / PRE-PROCEDURE SCREENING ORDER (NO ISOLATION) - Swab, Nasopharynx.  Procedure                               Abnormality         Status                     ---------                               -----------         ------                     COVID-19,CEPHEID/DINO,CO...[[id number]]  Normal              Final result                 Please view results for these tests on the individual orders.    COVID-19,CEPHEID/DION,COR/GA/PAD/JOSH IN-HOUSE(OR EMERGENT/ADD-ON),NP SWAB IN TRANSPORT MEDIA 3-4 HR TAT, RT-PCR - Swab, Nasopharynx [[id number]]  (Normal) Collected: 02/11/22 1341    Specimen: Swab from Nasopharynx Updated: 02/11/22 1406     COVID19 Not Detected    Narrative:      Fact sheet for providers: https://www.fda.gov/media/695523/download     Fact sheet for patients: https://www.fda.gov/media/296679/download  Fact sheet for providers: https://www.fda.gov/media/049582/download    Fact sheet for patients: https://www.fda.gov/media/395203/download    Test performed by PCR.    aPTT [[id number]]  (Normal) Collected: 02/11/22 1341    Specimen: Blood Updated: 02/11/22 1401     PTT 24.2 seconds     Protime-INR [[id number]]  (Normal) Collected: 02/11/22 1341    Specimen: Blood Updated: 02/11/22 1401      Protime 10.9 Seconds      INR 0.98    Hemoglobin & Hematocrit, Blood [[id number]]  (Abnormal) Collected: 02/11/22 1341    Specimen: Blood Updated: 02/11/22 1348     Hemoglobin 10.7 g/dL      Hematocrit 31.5 %     Comprehensive Metabolic Panel [[id number]]  (Abnormal) Collected: 02/11/22 1040    Specimen: Blood Updated: 02/11/22 1108     Glucose 288 mg/dL      BUN 28 mg/dL      Creatinine 0.81 mg/dL      Sodium 138 mmol/L      Potassium 4.3 mmol/L      Chloride 103 mmol/L      CO2 21.0 mmol/L      Calcium 8.9 mg/dL      Total Protein 6.0 g/dL      Albumin 3.50 g/dL      ALT (SGPT) 14 U/L      AST (SGOT) 13 U/L      Alkaline Phosphatase 77 U/L      Total Bilirubin 0.3 mg/dL      eGFR Non African Amer 71 mL/min/1.73      Globulin 2.5 gm/dL      A/G Ratio 1.4 g/dL      BUN/Creatinine Ratio 34.6     Anion Gap 14.0 mmol/L     Narrative:      GFR Normal >60  Chronic Kidney Disease <60  Kidney Failure <15      Lipase [[id number]]  (Normal) Collected: 02/11/22 1040    Specimen: Blood Updated: 02/11/22 1108     Lipase 51 U/L     Amylase [[id number]]  (Normal) Collected: 02/11/22 1040    Specimen: Blood Updated: 02/11/22 1108     Amylase 56 U/L     CBC & Differential [[id number]]  (Abnormal) Collected: 02/11/22 1040    Specimen: Blood Updated: 02/11/22 1048    Narrative:      The following orders were created for panel order CBC & Differential.  Procedure                               Abnormality         Status                     ---------                               -----------         ------                     CBC Auto Differential[[id number]]        Abnormal            Final result                 Please view results for these tests on the individual orders.    CBC Auto Differential [[id number]]  (Abnormal) Collected: 02/11/22 1040    Specimen: Blood Updated: 02/11/22 1048     WBC 15.60 10*3/mm3      RBC 3.88 10*6/mm3      Hemoglobin 11.5 g/dL      Hematocrit 34.4 %      MCV 88.7 fL      MCH 29.6 pg      MCHC 33.4 g/dL       RDW 13.6 %      RDW-SD 42.9 fl      MPV 7.2 fL      Platelets 351 10*3/mm3      Neutrophil % 86.7 %      Lymphocyte % 9.5 %      Monocyte % 3.2 %      Eosinophil % 0.3 %      Basophil % 0.3 %      Neutrophils, Absolute 13.50 10*3/mm3      Lymphocytes, Absolute 1.50 10*3/mm3      Monocytes, Absolute 0.50 10*3/mm3      Eosinophils, Absolute 0.00 10*3/mm3      Basophils, Absolute 0.00 10*3/mm3      nRBC 0.1 /100 WBC         Imaging Results (Last 24 Hours)     Procedure Component Value Units Date/Time    IR Angiogram Renal 1st Order [[id number]] Collected: 02/11/22 1607     Updated: 02/11/22 1617    Narrative:      DATE OF EXAM:  2/11/2022 2:47 PM     PROCEDURE:  IR ANGIOGRAM RENAL 1ST ORDER-     INDICATIONS:  Fall/trauma with large left perinephric or subcapsular hematoma and  evidence of active hemorrhage by CT scan. Interventional radiology  consulted for possible embolization of bleeding left renal artery  branch. W19.XXXA-Unspecified fall, initial encounter; S37.012A-Minor  contusion of left kidney, initial encounter     COMPARISON:  CT scan performed earlier same day.     FLUOROSCOPIC TIME:  3 minutes 9 seconds     PHYSICIAN MONITORED CONSCIOUS SEDATION TIME:  31 minutes     CONTRAST MEDIA:  45 cc Isovue-300     DESCRIPTION OF PROCEDURE:  The patient's medications were reviewed prior to the procedure. The  procedure, risks and alternatives were discussed and informed written  consent was obtained. Maximal sterile barrier technique was utilized  throughout the procedure. The patient was placed supine on the  fluoroscopy table. The right groin was prepped and draped using maximum  sterile barrier technique. Lidocaine was used for local anesthesia.  Access was obtained of the right common femoral artery under direct  ultrasound visualization using a micropuncture set. A Bentson wire was  advanced into the abdominal aorta and a 5 [place]e hemostatic sheath  placed. Using a 4 [place]amese Ashley's hook catheter, the left  renal artery  was selectively catheterized. Multiple left renal angiograms were then  performed in multiple obliquities. No abnormal vessels are demonstrated.  No evidence of pseudoaneurysm or active extravasation is identified.  There is normal parenchymal enhancement of the left kidney. Incidental  note is made of moderate narrowing of the origin of the left renal  artery. This may be due to kinking from the rotational effects of the  large perinephric hematoma, however.     A right femoral angiogram was performed to confirm anatomy appropriate  for use a closure device. The site was reprepped sterilely. Additional  lidocaine was given subcutaneously. Gloves were changed. Hemostasis was  achieved using an Angio-Seal closure device. Proximal and distal pulses  were intact following placement.     The procedure was performed under moderate conscious sedation with  continuous monitoring using Versed and fentanyl. 31 minutes of physician  monitored sedation were provided.       Impression:      Multiple left renal angiograms demonstrate no evidence of continued  active hemorrhage, pseudoaneurysm or abnormal vessel. No embolization  was performed or required.     Electronically Signed By-Juice Willingham MD On:2/11/2022 4:15 PM  This report was finalized on [id number] by  Juice Willingham MD.    CT Chest With Contrast Diagnostic [[id number]] Collected: 02/11/22 1229     Updated: 02/11/22 1236    Narrative:      CT CHEST W CONTRAST DIAGNOSTIC-, CT ABDOMEN PELVIS W CONTRAST-     Date of Exam: 2/11/2022 11:50 AM     Indication: Trauma severe left rib pain left upper quadrant pain.     Comparison: [facility] portable chest 11/23/2021. No prior abdominal/pelvic  imaging for comparison at this institution.     Technique: Serial and axial CT images of the chest were obtained  following the uneventful intravenous administration of 100 cc Isovue-370  contrast. Reconstructions in the coronal and sagittal planes were also  performed.   Automated exposure control and iterative reconstruction  methods were used.     FINDINGS:     CHEST: No acute osseous abnormalities are seen within the chest. No  displaced rib fracture is identified. There surgical changes of anterior  cervical spine fusion.     Heart size is normal. Mild coronary artery calcifications are present.  No pericardial effusion. No pleural effusion. No pneumothorax. Benign  calcified granuloma is present in the right lower lobe. Benign calcified  lymph node is seen in the right hilum. Minimal dependent atelectasis in  the lungs. No acute airspace disease.           ABDOMEN AND PELVIS: 1.2 x 4.1 x 1.4 cm left renal subcapsular hematoma  is seen. Serpiginous high density material consistent with active IV  contrast extravasation, is seen within the hematoma, consistent with  active bleeding. No discrete, qing laceration defect is identified on  this examination. A small cyst projects anteriorly from the left lower  renal pole measuring 17 x 12 mm.     Right kidney, liver, gallbladder, spleen, pancreas and adrenal glands  are normal. Normal appendix. The bowel does not appear thickened or  inflamed. Urinary bladder, uterus, and rectum are normal. There is mild  left-sided retroperitoneal stranding extending from the level of the  kidney into the pelvis.     No acute osseous abnormality is seen within the abdomen or pelvis.             Impression:      1. Large left renal subcapsular hematoma with signs of active bleeding.  I notified Dr. Martinez in the emergency room prior to the time of this  dictation.  2. No acute findings in the chest or pelvis.     Electronically Signed By-Laura Garcia MD On:2/11/2022 12:34 PM  This report was finalized on [id number] by  Laura Garcia MD.    CT Abdomen Pelvis With Contrast [[id number]] Collected: 02/11/22 1229     Updated: 02/11/22 1236    Narrative:      CT CHEST W CONTRAST DIAGNOSTIC-, CT ABDOMEN PELVIS W CONTRAST-     Date of Exam: 2/11/2022  11:50 AM     Indication: Trauma severe left rib pain left upper quadrant pain.     Comparison: [facility] portable chest 11/23/2021. No prior abdominal/pelvic  imaging for comparison at this institution.     Technique: Serial and axial CT images of the chest were obtained  following the uneventful intravenous administration of 100 cc Isovue-370  contrast. Reconstructions in the coronal and sagittal planes were also  performed.  Automated exposure control and iterative reconstruction  methods were used.     FINDINGS:     CHEST: No acute osseous abnormalities are seen within the chest. No  displaced rib fracture is identified. There surgical changes of anterior  cervical spine fusion.     Heart size is normal. Mild coronary artery calcifications are present.  No pericardial effusion. No pleural effusion. No pneumothorax. Benign  calcified granuloma is present in the right lower lobe. Benign calcified  lymph node is seen in the right hilum. Minimal dependent atelectasis in  the lungs. No acute airspace disease.           ABDOMEN AND PELVIS: 1.2 x 4.1 x 1.4 cm left renal subcapsular hematoma  is seen. Serpiginous high density material consistent with active IV  contrast extravasation, is seen within the hematoma, consistent with  active bleeding. No discrete, qing laceration defect is identified on  this examination. A small cyst projects anteriorly from the left lower  renal pole measuring 17 x 12 mm.     Right kidney, liver, gallbladder, spleen, pancreas and adrenal glands  are normal. Normal appendix. The bowel does not appear thickened or  inflamed. Urinary bladder, uterus, and rectum are normal. There is mild  left-sided retroperitoneal stranding extending from the level of the  kidney into the pelvis.     No acute osseous abnormality is seen within the abdomen or pelvis.             Impression:      1. Large left renal subcapsular hematoma with signs of active bleeding.  I notified Dr. Martinez in the emergency room prior  to the time of this  dictation.  2. No acute findings in the chest or pelvis.     Electronically Signed By-Laura Garcia MD On:2/11/2022 12:34 PM  This report was finalized on [id number] by  Laura Garcia MD.          Medication Review:   I have personally reviewed    Current Facility-Administered Medications:   •  amLODIPine (NORVASC) tablet 10 mg, 10 mg, Oral, Q24H, Yesenia Bustamante, APRN, 10 mg at 02/12/22 0822  •  atorvastatin (LIPITOR) tablet 40 mg, 40 mg, Oral, Nightly, Yesenia Bustamante, APRN, 40 mg at 02/11/22 2017  •  cefTRIAXone (ROCEPHIN) in FI 1 gram/10ml IV PUSH syringe, 1 g, Intravenous, Q24H, Aracelis King MD, 1 g at 02/11/22 1632  •  dextrose (D50W) (25 g/50 mL) IV injection 25 g, 25 g, Intravenous, Q15 Min PRN, Yesenia Bustamante, APRN  •  dextrose (GLUTOSE) oral gel 15 g, 15 g, Oral, Q15 Min PRN, Yesenia Bustamante, APRN  •  gabapentin (NEURONTIN) capsule 300 mg, 300 mg, Oral, Daily With Breakfast & Lunch, Yesenia Bustamante, APRN, 300 mg at 02/12/22 0822  •  gabapentin (NEURONTIN) capsule 600 mg, 600 mg, Oral, Nightly, Yesenia Bustamante, APRN, 600 mg at 02/11/22 2017  •  glucagon (human recombinant) (GLUCAGEN DIAGNOSTIC) 1 mg in sterile water (preservative free) 1 mL injection, 1 mg, Intramuscular, Q15 Min PRN, Yesenia Bustamante, APRN  •  hydrALAZINE (APRESOLINE) injection 10 mg, 10 mg, Intravenous, Q6H PRN, Yesenia Bustamante APRN  •  HYDROcodone-acetaminophen (NORCO) 7.5-325 MG per tablet 1 tablet, 1 tablet, Oral, Q6H PRN, Aracelis King MD, 1 tablet at 02/12/22 0821  •  insulin lispro (ADMELOG) injection 0-9 Units, 0-9 Units, Subcutaneous, TID AC, 2 Units at 02/12/22 0821 **AND** insulin lispro (ADMELOG) injection 0-9 Units, 0-9 Units, Subcutaneous, PRN, Yesenia Bustamante APRN  •  lisinopril (PRINIVIL,ZESTRIL) tablet 20 mg, 20 mg, Oral, Daily, Yesenia Bustamante APRN, 20 mg at 02/12/22 0823  •  melatonin tablet 5 mg, 5 mg, Oral, Nightly PRN, Yesenia Bustamante APRN  •  metFORMIN ER (GLUCOPHAGE-XR) 24  hr tablet 1,000 mg, 1,000 mg, Oral, BID, Granbasil, Yesenia C, APRN  •  Morphine sulfate (PF) injection 4 mg, 4 mg, Intravenous, Q3H PRN, Aracelis King MD, 4 mg at 02/11/22 1812  •  ondansetron (ZOFRAN) tablet 4 mg, 4 mg, Oral, Q6H PRN **OR** ondansetron (ZOFRAN) injection 4 mg, 4 mg, Intravenous, Q6H PRN, GranWiliam graciaie C, APRN  •  pantoprazole (PROTONIX) EC tablet 40 mg, 40 mg, Oral, Q AM, Grannan, Yesenia C, APRN, 40 mg at 02/12/22 0614  •  sertraline (ZOLOFT) tablet 150 mg, 150 mg, Oral, Daily, Wiliam Bustamanteie C, APRN, 150 mg at 02/12/22 0821  •  [COMPLETED] Insert peripheral IV, , , Once **AND** sodium chloride 0.9 % flush 10 mL, 10 mL, Intravenous, PRN, Baljit Martinez MD, 10 mL at 02/11/22 2017  •  sodium chloride 0.9 % flush 10 mL, 10 mL, Intravenous, Q12H, Wiliam Bustamanteie C, APRN, 10 mL at 02/12/22 0823  •  sodium chloride 0.9 % flush 10 mL, 10 mL, Intravenous, PRN, Wiliam Bustamanteie C, APRN    Current Outpatient Medications:   •  amLODIPine (NORVASC) 10 MG tablet, Take 1 tablet by mouth Daily., Disp: , Rfl:   •  aspirin 325 MG tablet, Take 1 tablet by mouth Daily., Disp: , Rfl:   •  atorvastatin (LIPITOR) 40 MG tablet, Take 1 tablet by mouth Every Night., Disp: , Rfl:   •  gabapentin (NEURONTIN) 300 MG capsule, Take 300 mg by mouth Daily With Breakfast & Lunch., Disp: , Rfl:   •  gabapentin (NEURONTIN) 300 MG capsule, Take 600 mg by mouth Every Night., Disp: , Rfl:   •  insulin glargine (LANTUS, SEMGLEE) 100 UNIT/ML injection, Inject 15 Units under the skin into the appropriate area as directed Every 12 (Twelve) Hours., Disp: , Rfl: 12  •  lisinopril (PRINIVIL,ZESTRIL) 20 MG tablet, Take 1 tablet by mouth Daily., Disp: , Rfl:   •  metFORMIN ER (GLUCOPHAGE-XR) 500 MG 24 hr tablet, Take 1,000 mg by mouth 2 (Two) Times a Day., Disp: , Rfl:   •  sertraline (ZOLOFT) 100 MG tablet, Take 150 mg by mouth Daily., Disp: , Rfl:   •  vitamin B-12 (CYANOCOBALAMIN) 1000 MCG tablet, Take 1 tablet by mouth Daily., Disp: , Rfl:   •   acetaminophen (TYLENOL) 325 MG tablet, Take 2 tablets by mouth Every 4 (Four) Hours As Needed for Mild Pain ., Disp: , Rfl:   •  dextrose (GLUTOSE) 40 % gel, Take 15 g by mouth Every 15 (Fifteen) Minutes As Needed for Low Blood Sugar (Blood sugar less than 70)., Disp: , Rfl:   •  glucagon (human recombinant) 1 mg/mL in sterile water (preservative free) injection injection, Inject 1 mL under the skin into the appropriate area as directed Every 15 (Fifteen) Minutes As Needed (Blood Glucose Less Than 70)., Disp: , Rfl:   •  insulin lispro (ADMELOG) 100 UNIT/ML injection, Inject 0-24 Units under the skin into the appropriate area as directed 3 (Three) Times a Day Before Meals., Disp: , Rfl: 12  •  insulin lispro (ADMELOG) 100 UNIT/ML injection, Inject 0-24 Units under the skin into the appropriate area as directed As Needed for High Blood Sugar (Per the administration instructions)., Disp: , Rfl: 12  •  insulin lispro (ADMELOG) 100 UNIT/ML injection, Inject 5 Units under the skin into the appropriate area as directed 3 (Three) Times a Day With Meals., Disp: , Rfl: 12  •  melatonin 5 MG tablet tablet, Take 1 tablet by mouth At Night As Needed (insomnia)., Disp: , Rfl:     Allergies:    Patient has no known allergies.    Assessment:    Active Problems:  Patient Active Problem List   Diagnosis   • Cerebrovascular accident (CVA) (Formerly [facility])   • Left-sided weakness   • Dizziness   • Type 2 diabetes mellitus with hyperglycemia (Formerly [facility])   • Peripheral neuropathy   • Hyperlipidemia   • Anxiety associated with depression   • Obesity, Class III, BMI 40-49.9 (morbid obesity) (Formerly [facility])   • Acute UTI (urinary tract infection)   • Fall   • Kidney hematoma   • Diabetes mellitus with coincident hypertension (Formerly [facility])   • Type 2 diabetes mellitus with hyperlipidemia ([facility]ly [facility])   • Diabetes mellitus with polyneuropathy (Formerly [facility])       Renal hematoma    Plan:    Cont to monitor Hgb  May need blood transfusion       Nehemiah Colón MD    2/12/2022  09:24  EST      Electronically signed by Nehemiah Colón MD at 02/12/22 0925          Consult Notes (last 72 hours)      Montserrat Watkins MD at 02/13/22 0827      Consult Orders    1. Inpatient Nephrology Consult [[id number]] ordered by Aracelis King MD at 02/13/22 0826                   NEPHROLOGY CONSULTATION-----[facility]    [facility]  [phone]  Samanta Watkins MD    Patient Care Team:  Baljit Samaniego MD as PCP - General (Family Medicine)  Montserrat Watkins MD as Consulting Physician (Nephrology)    CC/REASON FOR CONSULTATION: RENAL FAILURE/ELEVATED SERUM  CREATININE    PHYSICIAN REQUESTING CONSULTATION:     History of Present Illness     HPI    Patient is a 66 y.o. WF whom I was asked to see in consultation for evaluation and management of renal failure/elevated serum creatinine. Patient was admitted post fall and found to have Left Subcapsular Renal bleed.   Patient denies prior knowledge of functional kidney disease, proteinuria, or hematuria. Patient reports regular NSAID use in the form of Ibuprofen prior to admission. No known h/o hepatitis, TB, rheumatic fever, jaundice. Patient reports that she does bleed/bruise easily on ASA at home. +IV dye exposure with angiogram. No urinary sx. No edema or fluid retention.  +Compliance with home meds. Was not  on diuretics prior to admission. Was on ACE-I/ARB in the form of Lisinopril prior to admission. No herbal med use.      Review of Systems   Constitutional: Positive for activity change and fatigue. Negative for appetite change, chills, diaphoresis, fever and unexpected weight change.   HENT: Negative for congestion, dental problem, drooling, ear discharge, ear pain, facial swelling, hearing loss, mouth sores, nosebleeds, postnasal drip, rhinorrhea, sinus pressure, sinus pain, sneezing, sore throat, tinnitus, trouble swallowing and voice change.    Eyes: Negative for  photophobia, pain, discharge, redness, itching and visual disturbance.   Respiratory: Negative for apnea, cough, choking, chest tightness, shortness of breath, wheezing and stridor.    Cardiovascular: Negative for chest pain, palpitations and leg swelling.   Gastrointestinal: Positive for abdominal pain. Negative for abdominal distention, anal bleeding, blood in stool, constipation, diarrhea, nausea, rectal pain and vomiting.   Endocrine: Negative for cold intolerance, heat intolerance, polydipsia, polyphagia and polyuria.   Genitourinary: Positive for frequency. Negative for decreased urine volume, difficulty urinating, dysuria, enuresis, flank pain, genital sores, hematuria and urgency.   Musculoskeletal: Positive for arthralgias, back pain and myalgias. Negative for gait problem, joint swelling, neck pain and neck stiffness.   Skin: Negative for color change, pallor, rash and wound.   Allergic/Immunologic: Negative for environmental allergies, food allergies and immunocompromised state.   Neurological: Positive for weakness. Negative for dizziness, tremors, seizures, syncope, facial asymmetry, speech difficulty, light-headedness, numbness and headaches.   Hematological: Negative for adenopathy. Bruises/bleeds easily.   Psychiatric/Behavioral: Negative for agitation, behavioral problems, confusion, decreased concentration, dysphoric mood, hallucinations, self-injury, sleep disturbance and suicidal ideas. The patient is not nervous/anxious and is not hyperactive.           Past Medical History:   Diagnosis Date   • Diabetes mellitus (HCC)    • Hyperlipidemia    • Hypertension    • TIA (transient ischemic attack)        Past Surgical History:   Procedure Laterality Date   • CERVICAL FUSION     •  SECTION         Family History   Problem Relation Age of Onset   • Heart disease Father        Social History     Tobacco Use   • Smoking status: Former Smoker   • Smokeless tobacco: Never Used   Substance Use  Topics   • Alcohol use: Yes     Comment: once or twice a year   • Drug use: Never       Home Meds:   Medications Prior to Admission   Medication Sig Dispense Refill Last Dose   • acetaminophen (TYLENOL) 325 MG tablet Take 2 tablets by mouth Every 4 (Four) Hours As Needed for Mild Pain .      • amLODIPine (NORVASC) 10 MG tablet Take 1 tablet by mouth Daily.   2/10/2022 at Unknown time   • aspirin 325 MG tablet Take 1 tablet by mouth Daily.   2/10/2022 at Unknown time   • atorvastatin (LIPITOR) 40 MG tablet Take 1 tablet by mouth Every Night.   2/10/2022 at Unknown time   • dextrose (GLUTOSE) 40 % gel Take 15 g by mouth Every 15 (Fifteen) Minutes As Needed for Low Blood Sugar (Blood sugar less than 70).      • gabapentin (NEURONTIN) 300 MG capsule Take 300 mg by mouth Daily With Breakfast & Lunch.   2/10/2022 at Unknown time   • gabapentin (NEURONTIN) 300 MG capsule Take 600 mg by mouth Every Night.   2/10/2022 at Unknown time   • glucagon (human recombinant) 1 mg/mL in sterile water (preservative free) injection injection Inject 1 mL under the skin into the appropriate area as directed Every 15 (Fifteen) Minutes As Needed (Blood Glucose Less Than 70).      • insulin glargine (LANTUS, SEMGLEE) 100 UNIT/ML injection Inject 15 Units under the skin into the appropriate area as directed Every 12 (Twelve) Hours.  12 2/11/2022 at Unknown time   • insulin lispro (ADMELOG) 100 UNIT/ML injection Inject 0-24 Units under the skin into the appropriate area as directed 3 (Three) Times a Day Before Meals.  12    • insulin lispro (ADMELOG) 100 UNIT/ML injection Inject 5 Units under the skin into the appropriate area as directed 3 (Three) Times a Day With Meals.  12    • lisinopril (PRINIVIL,ZESTRIL) 20 MG tablet Take 1 tablet by mouth Daily.   2/10/2022 at Unknown time   • melatonin 5 MG tablet tablet Take 1 tablet by mouth At Night As Needed (insomnia).      • metFORMIN ER (GLUCOPHAGE-XR) 500 MG 24 hr tablet Take 1,000 mg by mouth  2 (Two) Times a Day.   2/10/2022 at Unknown time   • sertraline (ZOLOFT) 100 MG tablet Take 150 mg by mouth Daily.   2/10/2022 at Unknown time   • vitamin B-12 (CYANOCOBALAMIN) 1000 MCG tablet Take 1 tablet by mouth Daily.   2/10/2022 at Unknown time       Scheduled Meds:  Acetylcysteine, 1,200 mg, Oral, Q12H  atorvastatin, 40 mg, Oral, Nightly  cefTRIAXone, 1 g, Intravenous, Q24H  gabapentin, 300 mg, Oral, Daily With Breakfast & Lunch  gabapentin, 600 mg, Oral, Nightly  insulin lispro, 0-9 Units, Subcutaneous, TID AC  lidocaine, 2 patch, Transdermal, Q24H  pantoprazole, 40 mg, Oral, Q AM  sertraline, 150 mg, Oral, Daily  sodium bicarbonate, 1,300 mg, Oral, Once  sodium chloride, 10 mL, Intravenous, Q12H        Continuous Infusions:  sodium chloride, 100 mL/hr, Last Rate: 100 mL/hr (02/12/22 1527)        PRN Meds:  dextrose  •  dextrose  •  glucagon (human recombinant)  •  hydrALAZINE  •  HYDROcodone-acetaminophen  •  insulin lispro **AND** insulin lispro  •  melatonin  •  Morphine  •  ondansetron **OR** ondansetron  •  [COMPLETED] Insert peripheral IV **AND** sodium chloride  •  sodium chloride    Allergies:  Patient has no known allergies.    OBJECTIVE    Vital Signs  Temp:  [98.2 °F (36.8 °C)-100.7 °F (38.2 °C)] 98.8 °F (37.1 °C)  Heart Rate:  [85-94] 85  Resp:  [16-18] 16  BP: (100-133)/(56-87) 129/87    No intake/output data recorded.  I/O last 3 completed shifts:  In: 1317.5 [P.O.:960; Blood:357.5]  Out: -     Physical Exam:  General Appearance: alert, appears stated age and cooperative  Head: normocephalic, without obvious abnormality and atraumatic  Eyes: conjunctivae and sclerae normal and no icterus  Neck: supple and no JVD  Lungs: clear to auscultation and respirations regular  Heart: regular rhythm & normal rate and normal S1, S2 +MARCIO  Chest Wall: no abnormalities observed  Abdomen: normal bowel sounds and soft non-tender +OBESITY  Extremities: moves extremities well, no edema, no cyanosis +DJD  Skin: no  bleeding, bruising or rash +PALLOR  Neurologic: Alert, and oriented. No focal deficits    Results Review:    I reviewed the patient's new clinical results.    WBC WBC   Date Value Ref Range Status   02/13/2022 11.80 (H) 3.40 - 10.80 10*3/mm3 Final   02/12/2022 12.20 (H) 3.40 - 10.80 10*3/mm3 Final   02/11/2022 15.60 (H) 3.40 - 10.80 10*3/mm3 Final      HGB Hemoglobin   Date Value Ref Range Status   02/13/2022 7.5 (L) 12.0 - 15.9 g/dL Final   02/12/2022 7.7 (L) 12.0 - 15.9 g/dL Final   02/12/2022 8.4 (L) 12.0 - 15.9 g/dL Final   02/12/2022 8.5 (L) 12.0 - 15.9 g/dL Final   02/12/2022 8.6 (L) 12.0 - 15.9 g/dL Final   02/12/2022 8.7 (L) 12.0 - 15.9 g/dL Final   02/12/2022 9.8 (L) 12.0 - 15.9 g/dL Final   02/11/2022 10.6 (L) 12.0 - 15.9 g/dL Final   02/11/2022 11.0 (L) 12.0 - 15.9 g/dL Final   02/11/2022 10.7 (L) 12.0 - 15.9 g/dL Final   02/11/2022 10.7 (L) 12.0 - 15.9 g/dL Final   02/11/2022 11.5 (L) 12.0 - 15.9 g/dL Final      HCT Hematocrit   Date Value Ref Range Status   02/13/2022 22.5 (L) 34.0 - 46.6 % Final   02/12/2022 23.3 (L) 34.0 - 46.6 % Final   02/12/2022 24.8 (L) 34.0 - 46.6 % Final   02/12/2022 24.8 (L) 34.0 - 46.6 % Final   02/12/2022 25.3 (L) 34.0 - 46.6 % Final   02/12/2022 26.0 (L) 34.0 - 46.6 % Final   02/12/2022 29.0 (L) 34.0 - 46.6 % Final   02/11/2022 31.9 (L) 34.0 - 46.6 % Final   02/11/2022 33.1 (L) 34.0 - 46.6 % Final   02/11/2022 31.9 (L) 34.0 - 46.6 % Final   02/11/2022 31.5 (L) 34.0 - 46.6 % Final   02/11/2022 34.4 34.0 - 46.6 % Final      Platlets No results found for: LABPLAT   MCV MCV   Date Value Ref Range Status   02/13/2022 90.5 79.0 - 97.0 fL Final   02/12/2022 90.3 79.0 - 97.0 fL Final   02/11/2022 88.7 79.0 - 97.0 fL Final          Sodium Sodium   Date Value Ref Range Status   02/13/2022 138 136 - 145 mmol/L Final   02/12/2022 136 136 - 145 mmol/L Final   02/11/2022 138 136 - 145 mmol/L Final      Potassium Potassium   Date Value Ref Range Status   02/13/2022 4.7 3.5 - 5.2 mmol/L  Final   02/12/2022 5.4 (H) 3.5 - 5.2 mmol/L Final   02/11/2022 4.3 3.5 - 5.2 mmol/L Final      Chloride Chloride   Date Value Ref Range Status   02/13/2022 102 98 - 107 mmol/L Final   02/12/2022 102 98 - 107 mmol/L Final   02/11/2022 103 98 - 107 mmol/L Final      CO2 CO2   Date Value Ref Range Status   02/13/2022 24.0 22.0 - 29.0 mmol/L Final   02/12/2022 24.0 22.0 - 29.0 mmol/L Final   02/11/2022 21.0 (L) 22.0 - 29.0 mmol/L Final      BUN BUN   Date Value Ref Range Status   02/13/2022 43 (H) 8 - 23 mg/dL Final   02/12/2022 38 (H) 8 - 23 mg/dL Final   02/11/2022 28 (H) 8 - 23 mg/dL Final      Creatinine Creatinine   Date Value Ref Range Status   02/13/2022 1.74 (H) 0.57 - 1.00 mg/dL Final   02/12/2022 1.60 (H) 0.57 - 1.00 mg/dL Final   02/11/2022 0.81 0.57 - 1.00 mg/dL Final      Calcium Calcium   Date Value Ref Range Status   02/13/2022 8.1 (L) 8.6 - 10.5 mg/dL Final   02/12/2022 8.5 (L) 8.6 - 10.5 mg/dL Final   02/11/2022 8.9 8.6 - 10.5 mg/dL Final      PO4 No results found for: CAPO4   Albumin Albumin   Date Value Ref Range Status   02/13/2022 3.30 (L) 3.50 - 5.20 g/dL Final   02/12/2022 3.00 (L) 3.50 - 5.20 g/dL Final   02/11/2022 3.50 3.50 - 5.20 g/dL Final      Magnesium No results found for: MG   Uric Acid No results found for: URICACID       Imaging Results (Last 72 Hours)     Procedure Component Value Units Date/Time    IR Angiogram Renal 1st Order [[id number]] Collected: 02/11/22 1607     Updated: 02/11/22 1617    Narrative:      DATE OF EXAM:  2/11/2022 2:47 PM     PROCEDURE:  IR ANGIOGRAM RENAL 1ST ORDER-     INDICATIONS:  Fall/trauma with large left perinephric or subcapsular hematoma and  evidence of active hemorrhage by CT scan. Interventional radiology  consulted for possible embolization of bleeding left renal artery  branch. W19.XXXA-Unspecified fall, initial encounter; S37.012A-Minor  contusion of left kidney, initial encounter     COMPARISON:  CT scan performed earlier same day.     FLUOROSCOPIC  TIME:  3 minutes 9 seconds     PHYSICIAN MONITORED CONSCIOUS SEDATION TIME:  31 minutes     CONTRAST MEDIA:  45 cc Isovue-300     DESCRIPTION OF PROCEDURE:  The patient's medications were reviewed prior to the procedure. The  procedure, risks and alternatives were discussed and informed written  consent was obtained. Maximal sterile barrier technique was utilized  throughout the procedure. The patient was placed supine on the  fluoroscopy table. The right groin was prepped and draped using maximum  sterile barrier technique. Lidocaine was used for local anesthesia.  Access was obtained of the right common femoral artery under direct  ultrasound visualization using a micropuncture set. A Kereos wire was  advanced into the abdominal aorta and a 5 Macanese hemostatic sheath  placed. Using a 4 Macanese Ashley's hook catheter, the left renal artery  was selectively catheterized. Multiple left renal angiograms were then  performed in multiple obliquities. No abnormal vessels are demonstrated.  No evidence of pseudoaneurysm or active extravasation is identified.  There is normal parenchymal enhancement of the left kidney. Incidental  note is made of moderate narrowing of the origin of the left renal  artery. This may be due to kinking from the rotational effects of the  large perinephric hematoma, however.     A right femoral angiogram was performed to confirm anatomy appropriate  for use a closure device. The site was reprepped sterilely. Additional  lidocaine was given subcutaneously. Gloves were changed. Hemostasis was  achieved using an Angio-Seal closure device. Proximal and distal pulses  were intact following placement.     The procedure was performed under moderate conscious sedation with  continuous monitoring using Versed and fentanyl. 31 minutes of physician  monitored sedation were provided.       Impression:      Multiple left renal angiograms demonstrate no evidence of continued  active hemorrhage,  pseudoaneurysm or abnormal vessel. No embolization  was performed or required.     Electronically Signed By-Juice Willingham MD On:2/11/2022 4:15 PM  This report was finalized on [id number] by  Juice Willingham MD.    CT Chest With Contrast Diagnostic [[id number]] Collected: 02/11/22 1229     Updated: 02/11/22 1236    Narrative:      CT CHEST W CONTRAST DIAGNOSTIC-, CT ABDOMEN PELVIS W CONTRAST-     Date of Exam: 2/11/2022 11:50 AM     Indication: Trauma severe left rib pain left upper quadrant pain.     Comparison: AP portable chest 11/23/2021. No prior abdominal/pelvic  imaging for comparison at this institution.     Technique: Serial and axial CT images of the chest were obtained  following the uneventful intravenous administration of 100 cc Isovue-370  contrast. Reconstructions in the coronal and sagittal planes were also  performed.  Automated exposure control and iterative reconstruction  methods were used.     FINDINGS:     CHEST: No acute osseous abnormalities are seen within the chest. No  displaced rib fracture is identified. There surgical changes of anterior  cervical spine fusion.     Heart size is normal. Mild coronary artery calcifications are present.  No pericardial effusion. No pleural effusion. No pneumothorax. Benign  calcified granuloma is present in the right lower lobe. Benign calcified  lymph node is seen in the right hilum. Minimal dependent atelectasis in  the lungs. No acute airspace disease.           ABDOMEN AND PELVIS: 1.2 x 4.1 x 1.4 cm left renal subcapsular hematoma  is seen. Serpiginous high density material consistent with active IV  contrast extravasation, is seen within the hematoma, consistent with  active bleeding. No discrete, qing laceration defect is identified on  this examination. A small cyst projects anteriorly from the left lower  renal pole measuring 17 x 12 mm.     Right kidney, liver, gallbladder, spleen, pancreas and adrenal glands  are normal. Normal appendix. The  bowel does not appear thickened or  inflamed. Urinary bladder, uterus, and rectum are normal. There is mild  left-sided retroperitoneal stranding extending from the level of the  kidney into the pelvis.     No acute osseous abnormality is seen within the abdomen or pelvis.             Impression:      1. Large left renal subcapsular hematoma with signs of active bleeding.  I notified Dr. Martinez in the emergency room prior to the time of this  dictation.  2. No acute findings in the chest or pelvis.     Electronically Signed By-Laura Garcia MD On:2/11/2022 12:34 PM  This report was finalized on [id number] by  Laura Garcia MD.    CT Abdomen Pelvis With Contrast [[id number]] Collected: 02/11/22 1229     Updated: 02/11/22 1236    Narrative:      CT CHEST W CONTRAST DIAGNOSTIC-, CT ABDOMEN PELVIS W CONTRAST-     Date of Exam: 2/11/2022 11:50 AM     Indication: Trauma severe left rib pain left upper quadrant pain.     Comparison: AP portable chest 11/23/2021. No prior abdominal/pelvic  imaging for comparison at this institution.     Technique: Serial and axial CT images of the chest were obtained  following the uneventful intravenous administration of 100 cc Isovue-370  contrast. Reconstructions in the coronal and sagittal planes were also  performed.  Automated exposure control and iterative reconstruction  methods were used.     FINDINGS:     CHEST: No acute osseous abnormalities are seen within the chest. No  displaced rib fracture is identified. There surgical changes of anterior  cervical spine fusion.     Heart size is normal. Mild coronary artery calcifications are present.  No pericardial effusion. No pleural effusion. No pneumothorax. Benign  calcified granuloma is present in the right lower lobe. Benign calcified  lymph node is seen in the right hilum. Minimal dependent atelectasis in  the lungs. No acute airspace disease.           ABDOMEN AND PELVIS: 1.2 x 4.1 x 1.4 cm left renal subcapsular  hematoma  is seen. Serpiginous high density material consistent with active IV  contrast extravasation, is seen within the hematoma, consistent with  active bleeding. No discrete, qing laceration defect is identified on  this examination. A small cyst projects anteriorly from the left lower  renal pole measuring 17 x 12 mm.     Right kidney, liver, gallbladder, spleen, pancreas and adrenal glands  are normal. Normal appendix. The bowel does not appear thickened or  inflamed. Urinary bladder, uterus, and rectum are normal. There is mild  left-sided retroperitoneal stranding extending from the level of the  kidney into the pelvis.     No acute osseous abnormality is seen within the abdomen or pelvis.             Impression:      1. Large left renal subcapsular hematoma with signs of active bleeding.  I notified Dr. Martinez in the emergency room prior to the time of this  dictation.  2. No acute findings in the chest or pelvis.     Electronically Signed By-Laura Garcia MD On:2/11/2022 12:34 PM  This report was finalized on [id number] by  Laura Garcia MD.            Results for orders placed during the hospital encounter of 11/23/21    XR Chest 1 View    Narrative  DATE OF EXAM:  11/23/2021 3:34 PM    PROCEDURE:  XR CHEST 1 VW-    INDICATIONS:  Acute Stroke Protocol (onset < 12 hrs); I63.9-Cerebral infarction,  unspecified; E11.65-Type 2 diabetes mellitus with hyperglycemia    COMPARISON:  1/20/2018    TECHNIQUE:  Single radiographic view of the chest was obtained.    FINDINGS:  Cervical fixation hardware again noted. Slight elevation of right  hemidiaphragm. Evaluation of the bases is somewhat limited due to  overlying soft tissue. No definite focal consolidation is seen. Heart  size is within normal limits for technique. Aortic vascular  calcification is present. No pneumothorax identified.    Impression  Low lung volumes. No acute cardiopulmonary findings.    Electronically Signed By-Juice Willingham MD  On:11/23/2021 3:52 PM  This report was finalized on [id number] by  Juice Willingham MD.            ASSESSMENT / PLAN      Fall    Kidney hematoma    Diabetes mellitus with coincident hypertension (HCC)    Type 2 diabetes mellitus with hyperlipidemia (HCC)    Diabetes mellitus with polyneuropathy (HCC)    1. RENAL FAILURE-------Nonoliguric. +ARF/OUMOU with normal baseline renal function. +ARF/OUMOU is secondary to ATN from initial hypotension, anemia with decreased renal perfusion, preadmission ACE-I use and IV dye exposure with angiogram/CT. Trying to avoid further IV dye exposure; however patient in need of a repeat CT today with contrast. Will premedicate with IVFs, Mucomyst, and po bicarb. Off Lisinopril. Patient has been explained and understands risks of repeat IV dye exposure. Patient has also been explained her disease state and how she may need dialysis/renal replacement therapy depending on progression of her renal dysfunction. Check further urine and serum studies. No NSAIDs. Dose meds for CrCl less than 10 cc/min until ARF/OUMOU is resolved    2. DMII------D/C Metformin given ARF/OUMOU    3. HTN WITH CKD-------BP ok now. Avoid hypotension. No ACE-I/ARB/DRI    4. ANEMIA/LEFT SUBCAPSULAR RENAL BLEED------S/P IR Angiogram and was not able to be embolized. More PRBCs today. Give IV iron for DARLEEN    5. DEPRESSION-----On SSRI    6. OBESITY    7. GERD/PUD PROPHYLAXIS------On PPI. Benefits outweigh risks despite OUMOU    8. DM PERIPHERAL NEUROPATHY-----Back down Neurontin given OUMOU    9. OA/DJD------D/C Ibuprofen. Counseled patient on avoidance of NSAIDs. Check uric acid level    10. H/O TIA    I discussed the patients findings and my recommendations with patient, nursing staff, primary care team and consulting provider    Will follow along closely. Thank you for allowing us to see this patient in renal consultation.    [facility]  [phone]  MD Samanta Todd  MD Roland  22  08:27 EST              Electronically signed by Montserrat Watkins MD at 22 1309     Philippe Franklin MD at 22 1248      Consult Orders    1. Urology (on-call MD unless specified) [[id number]] ordered by Baljit Martinez MD at 22 1214                    FIRST UROLOGY CONSULT      Patient Identification:  NAME:  Medina Ann  Age:  66 y.o.   Sex:  female   :  1955   MRN:  [id number]       Chief complaint/Reason for consult: Status post fall with subcapsular hematoma    History of present illness:  66 y.o. female presented the ER after tripped and fell on left side has been left rib pain severe.  CT scan revealed a 4 cm left subcapsular hematoma with possible active bleeding.  She is in severe pain on left flank and blood pressure significantly elevated.  Creatinine is normal at 0.8.  Hemoglobin is 11.5 on initial check.      Past medical history:  Past Medical History:   Diagnosis Date   • Diabetes mellitus (HCC)    • Hyperlipidemia    • Hypertension    • TIA (transient ischemic attack)        Past surgical history:  Past Surgical History:   Procedure Laterality Date   • CERVICAL FUSION     •  SECTION         Allergies:  Patient has no known allergies.    Home medications:  (Not in a hospital admission)       Hospital medications:  Morphine, 4 mg, Intravenous, Once         [COMPLETED] Insert peripheral IV **AND** sodium chloride    Family history:  Family History   Problem Relation Age of Onset   • Heart disease Father        Social history:  Social History     Tobacco Use   • Smoking status: Former Smoker   • Smokeless tobacco: Never Used   Substance Use Topics   • Alcohol use: Yes     Comment: once or twice a year   • Drug use: Never       REVIEW OF SYSTEMS:  Constitutional - Negative for fevers, chills, lethargy  Eyes/Ears/Nose/Mouth/Throat - Negative for changes in vision or hearing  Cardiovascular - Negative for increased edema or  cyanosis  Respiratory - Negative for dyspnea or wheezing  Gastrointestinal - Negative for nausea or vomiting  Genitourinary - Negative for dysuria or hematuria  Hematologic/Lymphatic - Negative for bruising or cyanosis  Skin - Negative for erythema or rash  Psych - Negative     Objective:  TMax 24 hours:   Temp (24hrs), Av.7 °F (36.5 °C), Min:97.7 °F (36.5 °C), Max:97.7 °F (36.5 °C)      Vitals Ranges:   Temp:  [97.7 °F (36.5 °C)] 97.7 °F (36.5 °C)  Heart Rate:  [75] 75  Resp:  [20] 20  BP: (187)/(82) 187/82    Intake/Output Last 3 shifts:  No intake/output data recorded.     Physical Exam:    General Appearance:    Alert, cooperative, NAD   HEENT:    No trauma, pupils reactive, hearing intact   Lungs:     Respirations unlabored, no audible wheezing    Heart:    No cyanosis, No significant edema   Abdomen:     Soft, severe left-sided tenderness along flank   :    No suprapubic distention   Extremities:   No significant edema, no deformity   Lymphatic:   No neck or groin LAD       Neuro/Psych:   Orientation intact, mood/affect pleasant, no focal findings       Results review:   I reviewed the patient's new clinical results.    Data review:  Lab Results (last 24 hours)     Procedure Component Value Units Date/Time    Comprehensive Metabolic Panel [[id number]]  (Abnormal) Collected: 22 1040    Specimen: Blood Updated: 22 1108     Glucose 288 mg/dL      BUN 28 mg/dL      Creatinine 0.81 mg/dL      Sodium 138 mmol/L      Potassium 4.3 mmol/L      Chloride 103 mmol/L      CO2 21.0 mmol/L      Calcium 8.9 mg/dL      Total Protein 6.0 g/dL      Albumin 3.50 g/dL      ALT (SGPT) 14 U/L      AST (SGOT) 13 U/L      Alkaline Phosphatase 77 U/L      Total Bilirubin 0.3 mg/dL      eGFR Non African Amer 71 mL/min/1.73      Globulin 2.5 gm/dL      A/G Ratio 1.4 g/dL      BUN/Creatinine Ratio 34.6     Anion Gap 14.0 mmol/L     Narrative:      GFR Normal >60  Chronic Kidney Disease <60  Kidney Failure <15      Lipase  [[id number]]  (Normal) Collected: 02/11/22 1040    Specimen: Blood Updated: 02/11/22 1108     Lipase 51 U/L     Amylase [[id number]]  (Normal) Collected: 02/11/22 1040    Specimen: Blood Updated: 02/11/22 1108     Amylase 56 U/L     CBC & Differential [[id number]]  (Abnormal) Collected: 02/11/22 1040    Specimen: Blood Updated: 02/11/22 1048    Narrative:      The following orders were created for panel order CBC & Differential.  Procedure                               Abnormality         Status                     ---------                               -----------         ------                     CBC Auto Differential[[id number]]        Abnormal            Final result                 Please view results for these tests on the individual orders.    CBC Auto Differential [[id number]]  (Abnormal) Collected: 02/11/22 1040    Specimen: Blood Updated: 02/11/22 1048     WBC 15.60 10*3/mm3      RBC 3.88 10*6/mm3      Hemoglobin 11.5 g/dL      Hematocrit 34.4 %      MCV 88.7 fL      MCH 29.6 pg      MCHC 33.4 g/dL      RDW 13.6 %      RDW-SD 42.9 fl      MPV 7.2 fL      Platelets 351 10*3/mm3      Neutrophil % 86.7 %      Lymphocyte % 9.5 %      Monocyte % 3.2 %      Eosinophil % 0.3 %      Basophil % 0.3 %      Neutrophils, Absolute 13.50 10*3/mm3      Lymphocytes, Absolute 1.50 10*3/mm3      Monocytes, Absolute 0.50 10*3/mm3      Eosinophils, Absolute 0.00 10*3/mm3      Basophils, Absolute 0.00 10*3/mm3      nRBC 0.1 /100 WBC            Imaging:  Imaging Results (Last 24 Hours)     Procedure Component Value Units Date/Time    CT Chest With Contrast Diagnostic [[id number]] Collected: 02/11/22 1229     Updated: 02/11/22 1236    Narrative:      CT CHEST W CONTRAST DIAGNOSTIC-, CT ABDOMEN PELVIS W CONTRAST-     Date of Exam: 2/11/2022 11:50 AM     Indication: Trauma severe left rib pain left upper quadrant pain.     Comparison: [facility] portable chest 11/23/2021. No prior abdominal/pelvic  imaging for comparison at this  institution.     Technique: Serial and axial CT images of the chest were obtained  following the uneventful intravenous administration of 100 cc Isovue-370  contrast. Reconstructions in the coronal and sagittal planes were also  performed.  Automated exposure control and iterative reconstruction  methods were used.     FINDINGS:     CHEST: No acute osseous abnormalities are seen within the chest. No  displaced rib fracture is identified. There surgical changes of anterior  cervical spine fusion.     Heart size is normal. Mild coronary artery calcifications are present.  No pericardial effusion. No pleural effusion. No pneumothorax. Benign  calcified granuloma is present in the right lower lobe. Benign calcified  lymph node is seen in the right hilum. Minimal dependent atelectasis in  the lungs. No acute airspace disease.           ABDOMEN AND PELVIS: 1.2 x 4.1 x 1.4 cm left renal subcapsular hematoma  is seen. Serpiginous high density material consistent with active IV  contrast extravasation, is seen within the hematoma, consistent with  active bleeding. No discrete, qing laceration defect is identified on  this examination. A small cyst projects anteriorly from the left lower  renal pole measuring 17 x 12 mm.     Right kidney, liver, gallbladder, spleen, pancreas and adrenal glands  are normal. Normal appendix. The bowel does not appear thickened or  inflamed. Urinary bladder, uterus, and rectum are normal. There is mild  left-sided retroperitoneal stranding extending from the level of the  kidney into the pelvis.     No acute osseous abnormality is seen within the abdomen or pelvis.             Impression:      1. Large left renal subcapsular hematoma with signs of active bleeding.  I notified Dr. Martinez in the emergency room prior to the time of this  dictation.  2. No acute findings in the chest or pelvis.     Electronically Signed By-Laura Garcia MD On:2/11/2022 12:34 PM  This report was finalized on  [id number] by  Laura Garcia MD.    CT Abdomen Pelvis With Contrast [[id number]] Collected: 02/11/22 1229     Updated: 02/11/22 1236    Narrative:      CT CHEST W CONTRAST DIAGNOSTIC-, CT ABDOMEN PELVIS W CONTRAST-     Date of Exam: 2/11/2022 11:50 AM     Indication: Trauma severe left rib pain left upper quadrant pain.     Comparison: [facility] portable chest 11/23/2021. No prior abdominal/pelvic  imaging for comparison at this institution.     Technique: Serial and axial CT images of the chest were obtained  following the uneventful intravenous administration of 100 cc Isovue-370  contrast. Reconstructions in the coronal and sagittal planes were also  performed.  Automated exposure control and iterative reconstruction  methods were used.     FINDINGS:     CHEST: No acute osseous abnormalities are seen within the chest. No  displaced rib fracture is identified. There surgical changes of anterior  cervical spine fusion.     Heart size is normal. Mild coronary artery calcifications are present.  No pericardial effusion. No pleural effusion. No pneumothorax. Benign  calcified granuloma is present in the right lower lobe. Benign calcified  lymph node is seen in the right hilum. Minimal dependent atelectasis in  the lungs. No acute airspace disease.           ABDOMEN AND PELVIS: 1.2 x 4.1 x 1.4 cm left renal subcapsular hematoma  is seen. Serpiginous high density material consistent with active IV  contrast extravasation, is seen within the hematoma, consistent with  active bleeding. No discrete, qing laceration defect is identified on  this examination. A small cyst projects anteriorly from the left lower  renal pole measuring 17 x 12 mm.     Right kidney, liver, gallbladder, spleen, pancreas and adrenal glands  are normal. Normal appendix. The bowel does not appear thickened or  inflamed. Urinary bladder, uterus, and rectum are normal. There is mild  left-sided retroperitoneal stranding extending from the level of  the  kidney into the pelvis.     No acute osseous abnormality is seen within the abdomen or pelvis.             Impression:      1. Large left renal subcapsular hematoma with signs of active bleeding.  I notified Dr. Martinez in the emergency room prior to the time of this  dictation.  2. No acute findings in the chest or pelvis.     Electronically Signed By-Laura Garcia MD On:2/11/2022 12:34 PM  This report was finalized on [id number] by  Laura Garcia MD.             Assessment:       * No active hospital problems. *    Left subcapsular hematoma status post fall  Hypertension possible page kidney    Plan:     CT images reviewed, left subcapsular hematoma with wisp of contrast indicating active extravasation, no actual laceration seen on scan or current concern for renal pelvis injury  Call made to and discussed with IR regarding findings and possible need for embolization, Dr Willingham rec angiogram and poss embolization given wisp of extrav and active bleeding. Her hemoglobin is currently 11.5 this will need to be monitored very closely, Q2hrs checks if stable will space out.   Keep NPO and bedrest  Her blood pressure is currently significantly elevated with pain out of control, will control pain and monitor blood pressure levels.  This may be a sign of page kidney from compression of the renal parenchyma.  She will need close medical observation, will need TCU or ICU      Philippe Franklin MD  [facility]  [address], [address]  [place] [address] [phone]  02/11/22  12:49 EST       Electronically signed by Philippe Franklin MD at 02/11/22 0238

## 2022-02-14 NOTE — DISCHARGE SUMMARY
Date of Discharge:  2/14/2022    Discharge Diagnosis:   **Kidney hematoma [S37.019A]   Acute blood loss anemia [D62]   Acute kidney injury (HCC) [N17.9]   Fall [W19.XXXA]   Diabetes mellitus with coincident hypertension (HCC) [E11.9, I10]   Type 2 diabetes mellitus with hyperlipidemia (HCC) [E11.69, E78.5]   Diabetes mellitus with polyneuropathy (HCC) [E11.42]       Presenting Problem/History of Present Illness  Active Hospital Problems    Diagnosis  POA   • **Kidney hematoma [S37.019A]  Yes   • Acute blood loss anemia [D62]  No   • Acute kidney injury (HCC) [N17.9]  Yes   • Fall [W19.XXXA]  Yes   • Diabetes mellitus with coincident hypertension (HCC) [E11.9, I10]  Yes   • Type 2 diabetes mellitus with hyperlipidemia (HCC) [E11.69, E78.5]  Yes   • Diabetes mellitus with polyneuropathy (HCC) [E11.42]  Yes      Resolved Hospital Problems   No resolved problems to display.          Hospital Course  Patient is a 66 y.o. female who recently returned home after rehab stay (CVA with left sided weakness in 11/2021) who presented with left flank pain after a fall at home.  She landed on the corner of a wooden bench.  She had a moderate sized subcapsular left renal hematoma.  Initial imaging suggested an active bleed, but on angiogram with interventional radiology, no bleeding vessel was seen.  Hemoglobin dropped from  11.5 on admission to 7.5.  She received one unit of packed cells and IV iron. Repeat imaging showed smaller hematoma.  Creatinine increased, likely related to hypotension, but has normalized at discharge. She is being discharged home off of all blood thinners, lisinopril and NSAID's.  She can restart lisinopril as outpatient if creatinine remains normal. She will follow up with pcp and urologist in 2 weeks.    At the time of discharge pt is hemodynamically stable and pain is controlled with oral pain meds.  She is using an incentive spirometer.    Procedures Performed         Consults:   Consults     Date and  Time Order Name Status Description    2/13/2022  8:26 AM Inpatient Nephrology Consult Completed     2/11/2022  1:37 PM Family Medicine Consult      2/11/2022 12:14 PM Urology (on-call MD unless specified) Completed           Pertinent Test Results:    Lab Results (most recent)     Procedure Component Value Units Date/Time    POC Glucose Once [[id number]]  (Abnormal) Collected: 02/14/22 1106    Specimen: Blood Updated: 02/14/22 1107     Glucose 181 mg/dL      Comment: Serial Number: [id number]:  [id number]       Hemoglobin & Hematocrit, Blood [[id number]]  (Abnormal) Collected: 02/14/22 0802    Specimen: Blood Updated: 02/14/22 0847     Hemoglobin 9.1 g/dL      Hematocrit 25.7 %     POC Glucose Once [[id number]]  (Abnormal) Collected: 02/14/22 0725    Specimen: Blood Updated: 02/14/22 0728     Glucose 200 mg/dL      Comment: Serial Number: [id number]:  [id number]       Comprehensive Metabolic Panel [[id number]]  (Abnormal) Collected: 02/14/22 0424    Specimen: Blood Updated: 02/14/22 0619     Glucose 149 mg/dL      BUN 34 mg/dL      Creatinine 0.87 mg/dL      Sodium 135 mmol/L      Potassium 4.5 mmol/L      Chloride 102 mmol/L      CO2 23.0 mmol/L      Calcium 8.7 mg/dL      Total Protein 5.8 g/dL      Albumin 3.30 g/dL      ALT (SGPT) 9 U/L      AST (SGOT) 12 U/L      Alkaline Phosphatase 72 U/L      Total Bilirubin 0.4 mg/dL      eGFR Non African Amer 66 mL/min/1.73      Globulin 2.5 gm/dL      A/G Ratio 1.3 g/dL      BUN/Creatinine Ratio 39.1     Anion Gap 10.0 mmol/L     Narrative:      GFR Normal >60  Chronic Kidney Disease <60  Kidney Failure <15      Phosphorus [[id number]]  (Normal) Collected: 02/14/22 0424    Specimen: Blood Updated: 02/14/22 0617     Phosphorus 3.3 mg/dL     Magnesium [[id number]]  (Normal) Collected: 02/14/22 0424    Specimen: Blood Updated: 02/14/22 0617     Magnesium 2.1 mg/dL     Calcium, Ionized [[id number]]  (Abnormal) Collected: 02/14/22 0424    Specimen: Blood Updated: 02/14/22  0611     Ionized Calcium 1.19 mmol/L     CBC Auto Differential [[id number]]  (Abnormal) Collected: 02/14/22 0424    Specimen: Blood Updated: 02/14/22 0552     WBC 8.80 10*3/mm3      RBC 2.81 10*6/mm3      Hemoglobin 8.8 g/dL      Hematocrit 25.4 %      MCV 90.5 fL      MCH 31.3 pg      MCHC 34.6 g/dL      RDW 13.5 %      RDW-SD 42.4 fl      MPV 7.8 fL      Platelets 206 10*3/mm3      Neutrophil % 75.1 %      Lymphocyte % 14.2 %      Monocyte % 8.5 %      Eosinophil % 1.7 %      Basophil % 0.5 %      Neutrophils, Absolute 6.60 10*3/mm3      Lymphocytes, Absolute 1.30 10*3/mm3      Monocytes, Absolute 0.70 10*3/mm3      Eosinophils, Absolute 0.20 10*3/mm3      Basophils, Absolute 0.00 10*3/mm3      nRBC 0.0 /100 WBC     Hemoglobin & Hematocrit, Blood [[id number]]  (Abnormal) Collected: 02/14/22 0041    Specimen: Blood Updated: 02/14/22 0110     Hemoglobin 8.0 g/dL      Hematocrit 23.6 %     TSH [[id number]]  (Abnormal) Collected: 02/13/22 1319    Specimen: Blood Updated: 02/13/22 1352     TSH 0.178 uIU/mL     Eosinophil Smear - Urine, Urine, Clean Catch [[id number]]  (Normal) Collected: 02/13/22 1036    Specimen: Urine, Clean Catch Updated: 02/13/22 1225     Eosinophil Smear 0 % EOS/100 Cells     Sodium, Urine, Random - Urine, Clean Catch [[id number]] Collected: 02/13/22 1036    Specimen: Urine, Clean Catch Updated: 02/13/22 1108     Sodium, Urine 33 mmol/L     Narrative:      Reference intervals for random urine have not been established.  Clinical usage is dependent upon physician's interpretation in combination with other laboratory tests.       Urinalysis, Microscopic Only - Urine, Clean Catch [[id number]]  (Abnormal) Collected: 02/13/22 1037    Specimen: Urine, Clean Catch Updated: 02/13/22 1101     RBC, UA 3-5 /HPF      WBC, UA 3-5 /HPF      Bacteria, UA None Seen /HPF      Squamous Epithelial Cells, UA 0-2 /HPF      Hyaline Casts, UA 3-6 /LPF      Methodology Automated Microscopy    Urinalysis With Culture If  Indicated - Urine, Clean Catch [[id number]]  (Abnormal) Collected: 02/13/22 1037    Specimen: Urine, Clean Catch Updated: 02/13/22 1101     Color, UA Yellow     Appearance, UA Clear     pH, UA <=5.0     Specific Gravity, UA 1.029     Glucose,  mg/dL (Trace)     Ketones, UA Negative     Bilirubin, UA Negative     Blood, UA Small (1+)     Protein,  mg/dL (2+)     Leuk Esterase, UA Trace     Nitrite, UA Negative     Urobilinogen, UA 0.2 E.U./dL    Iron [[id number]]  (Abnormal) Collected: 02/13/22 0141    Specimen: Blood Updated: 02/13/22 0915     Iron 22 mcg/dL     Uric Acid [[id number]]  (Abnormal) Collected: 02/13/22 0141    Specimen: Blood Updated: 02/13/22 0915     Uric Acid 7.2 mg/dL     CK [[id number]]  (Normal) Collected: 02/13/22 0141    Specimen: Blood Updated: 02/13/22 0915     Creatine Kinase 151 U/L     Comprehensive Metabolic Panel [[id number]]  (Abnormal) Collected: 02/13/22 0141    Specimen: Blood Updated: 02/13/22 0252     Glucose 209 mg/dL      BUN 43 mg/dL      Creatinine 1.74 mg/dL      Sodium 138 mmol/L      Potassium 4.7 mmol/L      Chloride 102 mmol/L      CO2 24.0 mmol/L      Calcium 8.1 mg/dL      Total Protein 5.5 g/dL      Albumin 3.30 g/dL      ALT (SGPT) 9 U/L      AST (SGOT) 14 U/L      Alkaline Phosphatase 61 U/L      Total Bilirubin 0.3 mg/dL      eGFR Non African Amer 29 mL/min/1.73      Globulin 2.2 gm/dL      A/G Ratio 1.5 g/dL      BUN/Creatinine Ratio 24.7     Anion Gap 12.0 mmol/L     Narrative:      GFR Normal >60  Chronic Kidney Disease <60  Kidney Failure <15      CBC Auto Differential [[id number]]  (Abnormal) Collected: 02/13/22 0141    Specimen: Blood Updated: 02/13/22 0234     WBC 11.80 10*3/mm3      RBC 2.49 10*6/mm3      Hemoglobin 7.5 g/dL      Hematocrit 22.5 %      MCV 90.5 fL      MCH 30.3 pg      MCHC 33.5 g/dL      RDW 14.0 %      RDW-SD 44.6 fl      MPV 7.7 fL      Platelets 241 10*3/mm3      Neutrophil % 72.4 %      Lymphocyte % 18.7 %      Monocyte % 7.6 %       Eosinophil % 0.9 %      Basophil % 0.4 %      Neutrophils, Absolute 8.60 10*3/mm3      Lymphocytes, Absolute 2.20 10*3/mm3      Monocytes, Absolute 0.90 10*3/mm3      Eosinophils, Absolute 0.10 10*3/mm3      Basophils, Absolute 0.00 10*3/mm3      nRBC 0.0 /100 WBC     Urinalysis, Microscopic Only - Urine, Clean Catch [[id number]]  (Abnormal) Collected: 02/11/22 2318    Specimen: Urine, Clean Catch Updated: 02/11/22 2344     RBC, UA 0-2 /HPF      WBC, UA 6-12 /HPF      Bacteria, UA Trace /HPF      Squamous Epithelial Cells, UA 7-12 /HPF      Hyaline Casts, UA None Seen /LPF      Methodology Manual Light Microscopy    Urinalysis With Microscopic If Indicated (No Culture) - Urine, Clean Catch [[id number]]  (Abnormal) Collected: 02/11/22 2318    Specimen: Urine, Clean Catch Updated: 02/11/22 2334     Color, UA Yellow     Appearance, UA Clear     pH, UA <=5.0     Specific Gravity, UA 1.076     Glucose,  mg/dL (1+)     Ketones, UA Trace     Bilirubin, UA Negative     Blood, UA Large (3+)     Protein, UA >=300 mg/dL (3+)     Leuk Esterase, UA Trace     Nitrite, UA Negative     Urobilinogen, UA 0.2 E.U./dL    CK [[id number]]  (Normal) Collected: 02/11/22 1852    Specimen: Blood Updated: 02/11/22 1924     Creatine Kinase 122 U/L     COVID PRE-OP / PRE-PROCEDURE SCREENING ORDER (NO ISOLATION) - Swab, Nasopharynx [[id number]]  (Normal) Collected: 02/11/22 1341    Specimen: Swab from Nasopharynx Updated: 02/11/22 1406    Narrative:      The following orders were created for panel order COVID PRE-OP / PRE-PROCEDURE SCREENING ORDER (NO ISOLATION) - Swab, Nasopharynx.  Procedure                               Abnormality         Status                     ---------                               -----------         ------                     COVID-19,CEPHEID/DINO,CO...[[id number]]  Normal              Final result                 Please view results for these tests on the individual orders.    COVID-19,CEPHEID/DINO,COR/GA/PAD/JOSH  IN-HOUSE(OR EMERGENT/ADD-ON),NP SWAB IN TRANSPORT MEDIA 3-4 HR TAT, RT-PCR - Swab, Nasopharynx [[id number]]  (Normal) Collected: 02/11/22 1341    Specimen: Swab from Nasopharynx Updated: 02/11/22 1406     COVID19 Not Detected    Narrative:      Fact sheet for providers: https://www.fda.gov/media/241616/download     Fact sheet for patients: https://www.fda.gov/media/423292/download  Fact sheet for providers: https://www.fda.gov/media/643252/download    Fact sheet for patients: https://www.fda.gov/media/118197/download    Test performed by PCR.    aPTT [[id number]]  (Normal) Collected: 02/11/22 1341    Specimen: Blood Updated: 02/11/22 1401     PTT 24.2 seconds     Protime-INR [[id number]]  (Normal) Collected: 02/11/22 1341    Specimen: Blood Updated: 02/11/22 1401     Protime 10.9 Seconds      INR 0.98    Lipase [[id number]]  (Normal) Collected: 02/11/22 1040    Specimen: Blood Updated: 02/11/22 1108     Lipase 51 U/L     Amylase [[id number]]  (Normal) Collected: 02/11/22 1040    Specimen: Blood Updated: 02/11/22 1108     Amylase 56 U/L     CBC & Differential [[id number]]  (Abnormal) Collected: 02/11/22 1040    Specimen: Blood Updated: 02/11/22 1048    Narrative:      The following orders were created for panel order CBC & Differential.  Procedure                               Abnormality         Status                     ---------                               -----------         ------                     CBC Auto Differential[[id number]]        Abnormal            Final result                 Please view results for these tests on the individual orders.           Results for orders placed during the hospital encounter of 11/23/21    Adult Transthoracic Echo Complete W/ Cont if Necessary Per Protocol    Interpretation Summary  Normal LV size and contractility EF of 60 to 65%, mild concentric LVH seen.  Abnormal relaxation pattern consistent with diastolic dysfunction seen  Normal RV size  Mild left atrial enlargement  seen.  Agitated saline bubble contrast negative for septal defects  Aortic valve, mitral valve, tricuspid valve appears structurally normal, no significant regurgitation seen.  No pericardial effusion seen.  Proximal aorta appears normal in size.              Condition on Discharge:  Stable    Vital Signs  Temp:  [97.8 °F (36.6 °C)-99.2 °F (37.3 °C)] 97.8 °F (36.6 °C)  Heart Rate:  [83-92] 83  Resp:  [18-20] 18  BP: (116-190)/(62-89) 157/84    Physical Exam:     General Appearance:    Alert, cooperative, in no acute distress   Head:    Normocephalic, without obvious abnormality, atraumatic   Eyes:            Lids and lashes normal, conjunctivae and sclerae normal, no   icterus, no pallor, corneas clear, PERRLA   Ears:    Ears appear intact with no abnormalities noted   Throat:   No oral lesions, no thrush, oral mucosa moist   Neck:   No adenopathy, supple, trachea midline, no thyromegaly, no   carotid bruit, no JVD   Lungs:     Clear to auscultation,respirations regular, even and                  unlabored    Heart:    Regular rhythm and normal rate, normal S1 and S2, no            murmur, no gallop, no rub, no click   Chest Wall:    No abnormalities observed   Abdomen:     Left flank tenderness   Extremities:   Moves all extremities well, no edema, no cyanosis, no             redness   Pulses:   Pulses palpable and equal bilaterally   Skin:   No bleeding, bruising or rash   Lymph nodes:   No palpable adenopathy   Neurologic:   Cranial nerves 2 - 12 grossly intact, sensation intact, DTR       present and equal bilaterally       Discharge Disposition  Home or Self Care    Discharge Medications     Discharge Medications      New Medications      Instructions Start Date   ferrous sulfate 324 MG tablet delayed-release   324 mg, Oral, Daily With Breakfast      HYDROcodone-acetaminophen 7.5-325 MG per tablet  Commonly known as: NORCO   1 tablet, Oral, Every 6 Hours PRN      lidocaine 5 %  Commonly known as: LIDODERM   2  patches, Transdermal, Every 24 Hours Scheduled, Remove & Discard patch within 12 hours or as directed by MD   Start Date: February 15, 2022     ondansetron 4 MG tablet  Commonly known as: ZOFRAN   4 mg, Oral, Every 6 Hours PRN         Changes to Medications      Instructions Start Date   insulin lispro 100 UNIT/ML injection  Commonly known as: ADMELOG  What changed: how much to take   0-9 Units, Subcutaneous, 3 Times Daily Before Meals      insulin lispro 100 UNIT/ML injection  Commonly known as: ADMELOG  What changed:   · how much to take  · when to take this  · reasons to take this   0-9 Units, Subcutaneous, As Needed         Continue These Medications      Instructions Start Date   acetaminophen 325 MG tablet  Commonly known as: TYLENOL   650 mg, Oral, Every 4 Hours PRN      amLODIPine 10 MG tablet  Commonly known as: NORVASC   10 mg, Oral, Every 24 Hours Scheduled      atorvastatin 40 MG tablet  Commonly known as: LIPITOR   40 mg, Oral, Nightly      gabapentin 300 MG capsule  Commonly known as: NEURONTIN   300 mg, Oral, Daily With Breakfast & Lunch      gabapentin 300 MG capsule  Commonly known as: NEURONTIN   600 mg, Oral, Nightly      glucagon (human recombinant) 1 mg/mL in sterile water (preservative free) injection injection   1 mg, Subcutaneous, Every 15 Minutes PRN      insulin glargine 100 UNIT/ML injection  Commonly known as: LANTUS, SEMGLEE   15 Units, Subcutaneous, Every 12 Hours Scheduled      melatonin 5 MG tablet tablet   5 mg, Oral, Nightly PRN      metFORMIN  MG 24 hr tablet  Commonly known as: GLUCOPHAGE-XR   1,000 mg, Oral, 2 Times Daily      sertraline 100 MG tablet  Commonly known as: ZOLOFT   150 mg, Oral, Daily      vitamin B-12 1000 MCG tablet  Commonly known as: CYANOCOBALAMIN   1,000 mcg, Oral, Daily         Stop These Medications    aspirin 325 MG tablet     dextrose 40 % gel  Commonly known as: GLUTOSE     lisinopril 20 MG tablet  Commonly known as: PRINIVIL,ZESTRIL             Discharge Diet:  Low concentrated sweets  Activity at Discharge: Up with walker    Follow-up Appointments  No future appointments.  Additional Instructions for the Follow-ups that You Need to Schedule     Discharge Follow-up with PCP   As directed       Currently Documented PCP:    Baljit Samaniego MD    PCP Phone Number:    [phone]     Follow Up Details: 1-2 weeks         Discharge Follow-up with Specified Provider: [facility]; 2 Weeks   As directed      To: [facility]    Follow Up: 2 Weeks               Test Results Pending at Discharge       Aracelis King MD  02/14/22  13:00 EST    Time: Discharge 35 min

## 2022-02-14 NOTE — CASE MANAGEMENT/SOCIAL WORK
Discharge Planning Assessment  [facility]     Patient Name: Medina Ann  MRN: [id number]  Today's Date: 2/14/2022    Admit Date: 2/11/2022     Discharge Needs Assessment     Row Name 02/14/22 1529       Living Environment    Lives With alone    Current Living Arrangements home/apartment/condo    Primary Care Provided by self    Provides Primary Care For no one    Family Caregiver if Needed sibling(s); child(sunitha), adult    Family Caregiver Names Sister- Candace, son- Trevin    Quality of Family Relationships unable to assess    Able to Return to Prior Arrangements yes       Resource/Environmental Concerns    Resource/Environmental Concerns none    Transportation Concerns car, none       Transition Planning    Patient/Family Anticipates Transition to home with help/services; home with family    Patient/Family Anticipated Services at Transition home health care    Transportation Anticipated family or friend will provide       Discharge Needs Assessment    Readmission Within the Last 30 Days no previous admission in last 30 days    Equipment Currently Used at Home cane, quad; walker, rolling    Concerns to be Addressed care coordination/care conferences; discharge planning    Anticipated Changes Related to Illness none    Equipment Needed After Discharge none    Discharge Facility/Level of Care Needs home with home health    Provided Post Acute Provider List? N/A    Patient's Choice of Community Agency(s) [facility]               Discharge Plan     Row Name 02/14/22 9149       Plan    Plan DC Plan: Declined inpt rehab. Anticipate routine home with [facility] (pending acceptance).    Patient/Family in Agreement with Plan yes    Plan Comments CM met with patient at bedside to discuss dc planning. PCP and pharmacy confirmed. Reported no trouble affording medications. Declined inpt rehab and reported that her plan was to go home at discharge. Reported that she had [facility] following but was recently  released. Reported that she wanted to have them come out again. CM sent new referral in basket and to Brie torre who reported that patient was discharged from  services on 2/8 and she will review. Reported that nurse can come out within 48 hrs but therapy not able to get in until next week. CM updated liaison that dc orders are in at this time.              Continued Care and Services - Admitted Since 2/11/2022     Home Medical Care     Service Provider Request Status Selected Services Address Phone Fax Patient Preferred    [facility]  Pending - Request Sent N/A [address] [place] [id number] [phone] --               Expected Discharge Date and Time     Expected Discharge Date Expected Discharge Time    Feb 14, 2022          Demographic Summary     Row Name 02/14/22 1526       General Information    Admission Type inpatient    Referral Source admission list    Reason for Consult discharge planning    Preferred Language English     Used During This Interaction no       Contact Information    Permission Granted to Share Info With                Functional Status     Row Name 02/14/22 1528       Functional Status    Usual Activity Tolerance good    Current Activity Tolerance moderate       Functional Status, IADL    Medications independent    Meal Preparation assistive equipment    Housekeeping assistive equipment    Laundry assistive equipment    Shopping assistive equipment              LACE: 11    Met with patient in room wearing PPE: mask/goggles.      Maintained distance greater than six feet and spent less than 15 minutes in the room.      Kimberlee Ponce RN     Office Phone: [phone]  Office Cell: [phone]

## 2022-02-14 NOTE — THERAPY EVALUATION
Patient Name: Medina Ann  : 1955    MRN: [id number]                              Today's Date: 2022       Admit Date: 2022    Visit Dx:     ICD-10-CM ICD-9-CM   1. Fall, initial encounter  W19.XXXA E888.9   2. Hematoma of left kidney, initial encounter  S37.012A 866.01     Patient Active Problem List   Diagnosis   • Cerebrovascular accident (CVA) (HCC)   • Left-sided weakness   • Dizziness   • Type 2 diabetes mellitus with hyperglycemia (HCC)   • Peripheral neuropathy   • Hyperlipidemia   • Anxiety associated with depression   • Obesity, Class III, BMI 40-49.9 (morbid obesity) (HCC)   • Acute UTI (urinary tract infection)   • Fall   • Kidney hematoma   • Diabetes mellitus with coincident hypertension (HCC)   • Type 2 diabetes mellitus with hyperlipidemia (HCC)   • Diabetes mellitus with polyneuropathy (HCC)   • Acute blood loss anemia   • Acute kidney injury (HCC)     Past Medical History:   Diagnosis Date   • Diabetes mellitus (HCC)    • Hyperlipidemia    • Hypertension    • TIA (transient ischemic attack)      Past Surgical History:   Procedure Laterality Date   • CERVICAL FUSION     •  SECTION        General Information     Row Name 22 1306          Physical Therapy Time and Intention    Document Type evaluation  -AM     Mode of Treatment physical therapy  -AM     Row Name 22 1306          General Information    Patient Profile Reviewed yes  -AM     Prior Level of Function independent:; transfer; gait; using stairs; bed mobility  intermittently uses RW/st cane  -AM     Existing Precautions/Restrictions oxygen therapy device and L/min  2L  -AM     Row Name 22 1306          Living Environment    Lives With alone  -AM     Row Name 22 1306          Home Main Entrance    Number of Stairs, Main Entrance ten  -AM     Stair Railings, Main Entrance railings safe and in good condition  -AM     Row Name 22 1306          Stairs Within Home, Primary    Number of  Stairs, Within Home, Primary none  -AM     Row Name 02/14/22 1306          Cognition    Orientation Status (Cognition) oriented x 4  -AM     Row Name 02/14/22 1306          Safety Issues, Functional Mobility    Impairments Affecting Function (Mobility) balance; endurance/activity tolerance; pain; strength  -AM     Comment, Safety Issues/Impairments (Mobility) gait belt utilized  -AM           User Key  (r) = Recorded By, (t) = Taken By, (c) = Cosigned By    Initials Name Provider Type    AM Ernesto Hawley PT Physical Therapist               Mobility     Row Name 02/14/22 1307          Bed Mobility    Bed Mobility bed mobility (all) activities  -AM     All Activities, [place] (Bed Mobility) minimum assist (75% patient effort); moderate assist (50% patient effort); 1 person assist  -AM     Assistive Device (Bed Mobility) bed rails; draw sheet  -AM     Comment (Bed Mobility) with increased time and effort secondary to rib pain  -AM     Row Name 02/14/22 1307          Transfers    Comment (Transfers) x 3 attempts  -AM     Row Name 02/14/22 1307          Sit-Stand Transfer    Sit-Stand [place] (Transfers) minimum assist (75% patient effort); 1 person assist  -AM     Assistive Device (Sit-Stand Transfers) walker, front-wheeled  -AM     Row Name 02/14/22 1307          Gait/Stairs (Locomotion)    [place] Level (Gait) contact guard  -AM     Assistive Device (Gait) walker, front-wheeled  -AM     Distance in Feet (Gait) 15'  -AM     Deviations/Abnormal Patterns (Gait) --  narrow SHANTI, flexed trunk, short steps  -AM           User Key  (r) = Recorded By, (t) = Taken By, (c) = Cosigned By    Initials Name Provider Type    AM Ernesto Hawley PT Physical Therapist               Obj/Interventions     Row Name 02/14/22 1309          Range of Motion Comprehensive    Comment, General Range of Motion Decreaesed LUE movement secondary to rib fx that occured 2 weeks ago.  All others WFL  -AM     Row Name 02/14/22 1308           Strength Comprehensive (MMT)    Comment, General Manual Muscle Testing (MMT) Assessment Did not test BUE MMT secondary to rib pain.  BLEs: 4/5 throughout  -AM     Row Name 02/14/22 1309          Motor Skills    Motor Skills functional endurance  -AM     Functional Endurance fair  -AM     Row Name 02/14/22 1309          Balance    Balance Assessment sitting static balance; sitting dynamic balance; standing static balance; standing dynamic balance  -AM     Static Sitting Balance WFL  -AM     Dynamic Sitting Balance WFL  -AM     Static Standing Balance WFL  -AM     Dynamic Standing Balance mild impairment  -AM     Row Name 02/14/22 1309          Sensory Assessment (Somatosensory)    Sensory Assessment (Somatosensory) sensation intact  -AM           User Key  (r) = Recorded By, (t) = Taken By, (c) = Cosigned By    Initials Name Provider Type    AM Ernesto Hawley, PT Physical Therapist               Goals/Plan     Row Name 02/14/22 1315          Bed Mobility Goal 1 (PT)    Activity/Assistive Device (Bed Mobility Goal 1, PT) bed mobility activities, all  -AM     [place] Level/Cues Needed (Bed Mobility Goal 1, PT) modified independence  -AM     Time Frame (Bed Mobility Goal 1, PT) long term goal (LTG)  -AM     Row Name 02/14/22 1315          Transfer Goal 1 (PT)    Activity/Assistive Device (Transfer Goal 1, PT) transfers, all  -AM     [place] Level/Cues Needed (Transfer Goal 1, PT) modified independence  -AM     Time Frame (Transfer Goal 1, PT) long term goal (LTG)  -AM     Row Name 02/14/22 1315          Gait Training Goal 1 (PT)    Activity/Assistive Device (Gait Training Goal 1, PT) gait (walking locomotion); walker, rolling  -AM     [place] Level (Gait Training Goal 1, PT) modified independence  -AM     Distance (Gait Training Goal 1, PT) 100'  -AM     Time Frame (Gait Training Goal 1, PT) long term goal (LTG)  -AM     Row Name 02/14/22 1315          Stairs Goal 1 (PT)    Activity/Assistive Device  (Stairs Goal 1, PT) stairs, all skills  -AM     [place] Level/Cues Needed (Stairs Goal 1, PT) contact guard assist; 1 person assist  -AM     Number of Stairs (Stairs Goal 1, PT) flight  -AM     Time Frame (Stairs Goal 1, PT) long term goal (LTG)  -AM           User Key  (r) = Recorded By, (t) = Taken By, (c) = Cosigned By    Initials Name Provider Type    AM Ernesto Hawley, PT Physical Therapist               Clinical Impression     Row Name 02/14/22 1310          Pain    Additional Documentation Pain Scale: Numbers Pre/Post-Treatment (Group)  -AM     Row Name 02/14/22 1310          Pain Scale: Numbers Pre/Post-Treatment    Pretreatment Pain Rating 8/10  -AM     Posttreatment Pain Rating 8/10  -AM     Pain Location - Side Right  -AM     Pain Location other (see comments)  rib  -AM     Pain Intervention(s) Repositioned; Back rub; Emotional support  -AM     Row Name 02/14/22 1310          Plan of Care Review    Plan of Care Reviewed With patient  -AM     Outcome Summary Pt is a 65 y/o female admitted secondary to fall at home with L side rib evert.  CT chest/abdomen- large L renal hematoma with signs of active bleeding.  IR revealed no active hemmorhage, pseudoanuersym or abnormal vessel, no embolization required.  CT chest (-) fx.  Pt reports she fell 2 weeks ago and has L rib fx from that fall.  Pt lives alone in a 2nd floor apartment with no elevator access.  Pt with 2L O2 and IV this date.  Pt required min/mod A for bed mobility, min A for transfers with RW and ambualted 15' with RW with cga/min A.  Pt would benefit from inpt rehab but adamantly refuses at this time.  If pt continues to refuse inpt rehab, recommend HHPT  -AM     Row Name 02/14/22 1310          Therapy Assessment/Plan (PT)    Patient/Family Therapy Goals Statement (PT) To go home  -AM     Rehab Potential (PT) good, to achieve stated therapy goals  -AM     Criteria for Skilled Interventions Met (PT) yes  -AM     Predicted Duration of Therapy  Intervention (PT) until d/c  -AM     Row Name 02/14/22 1310          Vital Signs    O2 Delivery Pre Treatment nasal cannula  2L  -AM     O2 Delivery Intra Treatment nasal cannula  -AM     O2 Delivery Post Treatment nasal cannula  -AM     Pre Patient Position Supine  -AM     Intra Patient Position Standing  -AM     Post Patient Position Supine  -AM     Row Name 02/14/22 1310          Positioning and Restraints    Pre-Treatment Position in bed  -AM     Post Treatment Position bed  -AM     In Bed notified nsg; supine; call light within reach; encouraged to call for assist; exit alarm on  -AM           User Key  (r) = Recorded By, (t) = Taken By, (c) = Cosigned By    Initials Name Provider Type    AM Ernesto Hawley, PT Physical Therapist               Outcome Measures     Row Name 02/14/22 1316          How much help from another person do you currently need...    Turning from your back to your side while in flat bed without using bedrails? 3  -AM     Moving from lying on back to sitting on the side of a flat bed without bedrails? 2  -AM     Moving to and from a bed to a chair (including a wheelchair)? 3  -AM     Standing up from a chair using your arms (e.g., wheelchair, bedside chair)? 3  -AM     Climbing 3-5 steps with a railing? 2  -AM     To walk in hospital room? 3  -AM     AM-PAC 6 Clicks Score (PT) 16  -AM     Row Name 02/14/22 1316          Functional Assessment    Outcome Measure Options AM-PAC 6 Clicks Basic Mobility (PT)  -AM           User Key  (r) = Recorded By, (t) = Taken By, (c) = Cosigned By    Initials Name Provider Type    AM Ernesto Hawley, PT Physical Therapist                             Physical Therapy Education                 Title: PT OT SLP Therapies (Done)     Topic: Physical Therapy (Done)     Point: Mobility training (Done)     Learning Progress Summary           Patient Acceptance, E,TB, VU by AM at 2/14/2022 1316                   Point: Home exercise program (Done)     Learning  Progress Summary           Patient Acceptance, E,TB, VU by AM at 2/14/2022 1316                   Point: Body mechanics (Done)     Learning Progress Summary           Patient Acceptance, E,TB, VU by AM at 2/14/2022 1316                   Point: Precautions (Done)     Learning Progress Summary           Patient Acceptance, E,TB, VU by AM at 2/14/2022 1316                               User Key     Initials Effective Dates Name Provider Type Discipline    AM 05/10/21 -  Ernesto Hawley, PT Physical Therapist PT              PT Recommendation and Plan  Planned Therapy Interventions (PT): balance training, bed mobility training, gait training, transfer training, strengthening, stair training, patient/family education  Plan of Care Reviewed With: patient  Outcome Summary: Pt is a 65 y/o female admitted secondary to fall at home with L side rib evert.  CT chest/abdomen- large L renal hematoma with signs of active bleeding.  IR revealed no active hemmorhage, pseudoanuersym or abnormal vessel, no embolization required.  CT chest (-) fx.  Pt reports she fell 2 weeks ago and has L rib fx from that fall.  Pt lives alone in a 2nd floor apartment with no elevator access.  Pt with 2L O2 and IV this date.  Pt required min/mod A for bed mobility, min A for transfers with RW and ambualted 15' with RW with cga/min A.  Pt would benefit from inpt rehab but adamantly refuses at this time.  If pt continues to refuse inpt rehab, recommend HHPT     Time Calculation:    PT Charges     Row Name 02/14/22 1317             Time Calculation    Start Time 0852  -AM      Stop Time 0912  -AM      Time Calculation (min) 20 min  -AM      PT Received On 02/14/22  -AM      PT - Next Appointment 02/16/22  -AM      PT Goal Re-Cert Due Date 02/28/22  -AM              Time Calculation- PT    Total Timed Code Minutes- PT 10 minute(s)  -AM            User Key  (r) = Recorded By, (t) = Taken By, (c) = Cosigned By    Initials Name Provider Type    AM Chandan,  Ernesto, PT Physical Therapist              Therapy Charges for Today     Code Description Service Date Service Provider Modifiers Qty    13482261527 HC PT EVAL MOD COMPLEXITY 3 2/14/2022 Ernesto Hawley, PT GP 1    49665717692 HC PT THERAPEUTIC ACT EA 15 MIN 2/14/2022 Ernesto Hawley, PT GP 1          PT G-Codes  Outcome Measure Options: AM-PAC 6 Clicks Basic Mobility (PT)  AM-PAC 6 Clicks Score (PT): 16    Ernesto Hawley, PT  2/14/2022

## 2022-02-14 NOTE — PLAN OF CARE
Goal Outcome Evaluation:  Plan of Care Reviewed With: patient           Outcome Summary: Pt is a 67 y/o female admitted secondary to fall at home with L side rib evert.  CT chest/abdomen- large L renal hematoma with signs of active bleeding.  IR revealed no active hemmorhage, pseudoanuersym or abnormal vessel, no embolization required.  CT chest (-) fx.  Pt reports she fell 2 weeks ago and has L rib fx from that fall.  Pt lives alone in a 2nd floor apartment with no elevator access.  Pt with 2L O2 and IV this date.  Pt required min/mod A for bed mobility, min A for transfers with RW and ambualted 15' with RW with cga/min A.  Pt would benefit from inpt rehab but adamantly refuses at this time.  If pt continues to refuse inpt rehab, recommend HHPT

## 2022-02-15 NOTE — OUTREACH NOTE
Prep Survey      Responses   [facility] patient discharged from? [facility]   Is LACE score < 7 ? No   Emergency Room discharge w/ pulse ox? No   Eligibility Readm Mgmt   Discharge diagnosis Kidney hematoma    Does the patient have one of the following disease processes/diagnoses(primary or secondary)? Other   Does the patient have Home health ordered? Yes   What is the Home health agency?  [facility]    Is there a DME ordered? No   Prep survey completed? Yes          Yesenia Orozco RN

## 2022-02-15 NOTE — CASE MANAGEMENT/SOCIAL WORK
Case Management Discharge Note      Final Note: Elena CAMACHO, acceptance confirmed with Brie torre.      Selected Continued Care - Discharged on 2/14/2022 Admission date: 2/11/2022 - Discharge disposition: Home or Self Care        Home Medical Care Coordination complete.    Service Provider Selected Services Address Phone Fax Patient Preferred    [facility] [address] [place] [address] [place] [phone] --            Final Discharge Disposition Code: 06 - home with home health care

## 2022-02-16 ENCOUNTER — READMISSION MANAGEMENT (OUTPATIENT)
Dept: CALL CENTER | Facility: HOSPITAL | Age: 67
End: 2022-02-16

## 2022-02-16 NOTE — OUTREACH NOTE
Medical Week 1 Survey      Responses   [facility] patient discharged from? [facility]   Does the patient have one of the following disease processes/diagnoses(primary or secondary)? Other   Week 1 attempt successful? No   Unsuccessful attempts Attempt 1          Florence Torres RN

## 2022-02-21 ENCOUNTER — READMISSION MANAGEMENT (OUTPATIENT)
Dept: CALL CENTER | Facility: HOSPITAL | Age: 67
End: 2022-02-21

## 2022-02-21 NOTE — OUTREACH NOTE
Medical Week 1 Survey      Responses   [facility] patient discharged from? [facility]   Does the patient have one of the following disease processes/diagnoses(primary or secondary)? Other   Week 1 attempt successful? Yes   Call start time 1350   Call end time 1356   Discharge diagnosis Kidney hematoma    Is patient permission given to speak with other caregiver? Yes   Person spoke with today (if not patient) and relationship Sister   Meds reviewed with patient/caregiver? Yes   Is the patient having any side effects they believe may be caused by any medication additions or changes? No   Does the patient have all medications ordered at discharge? Yes   Is the patient taking all medications as directed (includes completed medication regime)? Yes   Does the patient have a primary care provider?  Yes   Does the patient have an appointment with their PCP within 7 days of discharge? No   What is preventing the patient from scheduling follow up appointments within 7 days of discharge? Uncomfortable attending in person appointment   Nursing Interventions Educated patient on importance of making appointment   Has the patient kept scheduled appointments due by today? N/A   Comments Encouraged telehealth appt this week.   What is the Home health agency?  [facility]    Has home health visited the patient within 72 hours of discharge? Yes   Psychosocial issues? No   Did the patient receive a copy of their discharge instructions? Yes   Nursing interventions Reviewed instructions with patient   What is the patient's perception of their health status since discharge? Improving   Is the patient/caregiver able to teach back signs and symptoms related to disease process for when to call PCP? Yes   Is the patient/caregiver able to teach back signs and symptoms related to disease process for when to call 911? Yes   Is the patient/caregiver able to teach back the hierarchy of who to call/visit for  symptoms/problems? PCP, Specialist, Home health nurse, Urgent Care, ED, 911 Yes   Additional teach back comments she is leary of her steps to her appt. Wants to improve prior to going to appt, suggested telehealth.   Week 1 call completed? Yes   Wrap up additional comments Improving.          Courtney Cowart RN

## 2022-03-01 ENCOUNTER — READMISSION MANAGEMENT (OUTPATIENT)
Dept: CALL CENTER | Facility: HOSPITAL | Age: 67
End: 2022-03-01

## 2022-03-01 NOTE — OUTREACH NOTE
Medical Week 2 Survey      Responses   [facility] patient discharged from? [facility]   Does the patient have one of the following disease processes/diagnoses(primary or secondary)? Other   Week 2 attempt successful? No   Unsuccessful attempts Attempt 1          Alyssa Ugalde LPN

## 2022-03-03 ENCOUNTER — READMISSION MANAGEMENT (OUTPATIENT)
Dept: CALL CENTER | Facility: HOSPITAL | Age: 67
End: 2022-03-03

## 2022-03-03 NOTE — OUTREACH NOTE
Medical Week 2 Survey      Responses   [facility] patient discharged from? [facility]   Does the patient have one of the following disease processes/diagnoses(primary or secondary)? Other   Week 2 attempt successful? No   Unsuccessful attempts Attempt 2          Alyssa Ugalde LPN

## 2022-03-14 ENCOUNTER — READMISSION MANAGEMENT (OUTPATIENT)
Dept: CALL CENTER | Facility: HOSPITAL | Age: 67
End: 2022-03-14

## 2022-03-14 NOTE — OUTREACH NOTE
Medical Week 3 Survey    Flowsheet Row Responses   [facility] facility patient discharged from? [facility]   Does the patient have one of the following disease processes/diagnoses(primary or secondary)? Other   Week 3 attempt successful? No   Unsuccessful attempts Attempt 1          ANAHY HERNANDEZ - Registered Nurse

## 2022-03-17 ENCOUNTER — READMISSION MANAGEMENT (OUTPATIENT)
Dept: CALL CENTER | Facility: HOSPITAL | Age: 67
End: 2022-03-17

## 2022-03-17 NOTE — OUTREACH NOTE
Medical Week 3 Survey    Flowsheet Row Responses   [facility] facility patient discharged from? [facility]   Does the patient have one of the following disease processes/diagnoses(primary or secondary)? Other   Week 3 attempt successful? No   Unsuccessful attempts Attempt 2          YARIEL GOMEZ - Registered Nurse

## 2022-05-05 ENCOUNTER — OFFICE (OUTPATIENT)
Dept: URBAN - METROPOLITAN AREA CLINIC 64 | Facility: CLINIC | Age: 67
End: 2022-05-05

## 2022-05-05 VITALS
SYSTOLIC BLOOD PRESSURE: 202 MMHG | DIASTOLIC BLOOD PRESSURE: 119 MMHG | WEIGHT: 290 LBS | HEART RATE: 85 BPM | HEIGHT: 69 IN

## 2022-05-05 DIAGNOSIS — D50.9 IRON DEFICIENCY ANEMIA, UNSPECIFIED: ICD-10-CM

## 2022-05-05 PROCEDURE — 99202 OFFICE O/P NEW SF 15 MIN: CPT | Performed by: NURSE PRACTITIONER

## 2022-09-28 ENCOUNTER — HOME HEALTH ADMISSION (OUTPATIENT)
Dept: HOME HEALTH SERVICES | Facility: HOME HEALTHCARE | Age: 67
End: 2022-09-28

## 2022-09-28 ENCOUNTER — TRANSCRIBE ORDERS (OUTPATIENT)
Dept: HOME HEALTH SERVICES | Facility: HOME HEALTHCARE | Age: 67
End: 2022-09-28

## 2022-09-28 DIAGNOSIS — I63.9 CEREBRAL INFARCTION, UNSPECIFIED MECHANISM: Primary | ICD-10-CM

## 2022-10-18 ENCOUNTER — TRANSCRIBE ORDERS (OUTPATIENT)
Dept: HOME HEALTH SERVICES | Facility: HOME HEALTHCARE | Age: 67
End: 2022-10-18

## 2022-10-18 ENCOUNTER — HOME HEALTH ADMISSION (OUTPATIENT)
Dept: HOME HEALTH SERVICES | Facility: HOME HEALTHCARE | Age: 67
End: 2022-10-18

## 2022-10-18 DIAGNOSIS — I69.354 HEMIPARESIS AFFECTING LEFT SIDE AS LATE EFFECT OF CEREBROVASCULAR ACCIDENT: Primary | ICD-10-CM

## 2023-03-15 NOTE — POST-PROCEDURE NOTE
3rd attempt:  Please contact patient and see if they have home BP, most recent and an average. If they do not have home BP then please schedule f/I within 7-10 days for nurse visit for BP check. Please make sure pt has and is taking all medications prior to nurse visit.        IR POST OP NOTE    Procedure:L renal angiograms      Pre Op DX:L perinephric hematoma with active hemorrhage by CT scan.       Post Op DX:Same      Anesthesia: Local, CS      Findings:No evidence of active hemorrhage, pseudo aneurysm or abnormal vessel. No embolization performed or required.        Complications:None immediate      Provider Signature: Dr. Juice Willingham

## 2023-03-22 ENCOUNTER — TRANSCRIBE ORDERS (OUTPATIENT)
Dept: ADMINISTRATIVE | Facility: HOSPITAL | Age: 68
End: 2023-03-22
Payer: MEDICARE

## 2023-03-22 ENCOUNTER — HOSPITAL ENCOUNTER (OUTPATIENT)
Dept: CARDIOLOGY | Facility: HOSPITAL | Age: 68
Discharge: HOME | End: 2023-03-22
Admitting: FAMILY MEDICINE
Payer: MEDICARE

## 2023-03-22 DIAGNOSIS — M79.89 SWELLING OF LIMB: Primary | ICD-10-CM

## 2023-03-22 DIAGNOSIS — M79.89 SWELLING OF LIMB: ICD-10-CM

## 2023-03-22 LAB
BH CV LOWER VASCULAR LEFT COMMON FEMORAL AUGMENT: NORMAL
BH CV LOWER VASCULAR LEFT COMMON FEMORAL COMPETENT: NORMAL
BH CV LOWER VASCULAR LEFT COMMON FEMORAL COMPRESS: NORMAL
BH CV LOWER VASCULAR LEFT COMMON FEMORAL PHASIC: NORMAL
BH CV LOWER VASCULAR LEFT COMMON FEMORAL SPONT: NORMAL
BH CV LOWER VASCULAR LEFT DISTAL FEMORAL COMPRESS: NORMAL
BH CV LOWER VASCULAR LEFT GASTRONEMIUS COMPRESS: NORMAL
BH CV LOWER VASCULAR LEFT GREATER SAPH AK COMPRESS: NORMAL
BH CV LOWER VASCULAR LEFT GREATER SAPH BK COMPRESS: NORMAL
BH CV LOWER VASCULAR LEFT LESSER SAPH COMPRESS: NORMAL
BH CV LOWER VASCULAR LEFT MID FEMORAL AUGMENT: NORMAL
BH CV LOWER VASCULAR LEFT MID FEMORAL COMPETENT: NORMAL
BH CV LOWER VASCULAR LEFT MID FEMORAL COMPRESS: NORMAL
BH CV LOWER VASCULAR LEFT MID FEMORAL PHASIC: NORMAL
BH CV LOWER VASCULAR LEFT MID FEMORAL SPONT: NORMAL
BH CV LOWER VASCULAR LEFT PERONEAL COMPRESS: NORMAL
BH CV LOWER VASCULAR LEFT POPLITEAL AUGMENT: NORMAL
BH CV LOWER VASCULAR LEFT POPLITEAL COMPETENT: NORMAL
BH CV LOWER VASCULAR LEFT POPLITEAL COMPRESS: NORMAL
BH CV LOWER VASCULAR LEFT POPLITEAL PHASIC: NORMAL
BH CV LOWER VASCULAR LEFT POPLITEAL SPONT: NORMAL
BH CV LOWER VASCULAR LEFT POSTERIOR TIBIAL COMPRESS: NORMAL
BH CV LOWER VASCULAR LEFT PROFUNDA FEMORAL COMPRESS: NORMAL
BH CV LOWER VASCULAR LEFT PROXIMAL FEMORAL COMPRESS: NORMAL
BH CV LOWER VASCULAR LEFT SAPHENOFEMORAL JUNCTION COMPRESS: NORMAL
BH CV LOWER VASCULAR RIGHT COMMON FEMORAL AUGMENT: NORMAL
BH CV LOWER VASCULAR RIGHT COMMON FEMORAL COMPETENT: NORMAL
BH CV LOWER VASCULAR RIGHT COMMON FEMORAL COMPRESS: NORMAL
BH CV LOWER VASCULAR RIGHT COMMON FEMORAL PHASIC: NORMAL
BH CV LOWER VASCULAR RIGHT COMMON FEMORAL SPONT: NORMAL
BH CV VAS PRELIMINARY FINDINGS SCRIPTING: 1
MAXIMAL PREDICTED HEART RATE: 155 BPM
STRESS TARGET HR: 132 BPM

## 2023-03-22 PROCEDURE — 93971 EXTREMITY STUDY: CPT
